# Patient Record
Sex: MALE | Race: WHITE | Employment: OTHER | URBAN - METROPOLITAN AREA
[De-identification: names, ages, dates, MRNs, and addresses within clinical notes are randomized per-mention and may not be internally consistent; named-entity substitution may affect disease eponyms.]

---

## 2018-04-27 PROCEDURE — 82570 ASSAY OF URINE CREATININE: CPT | Performed by: INTERNAL MEDICINE

## 2018-04-27 PROCEDURE — 82043 UR ALBUMIN QUANTITATIVE: CPT | Performed by: INTERNAL MEDICINE

## 2019-03-21 ENCOUNTER — TELEPHONE (OUTPATIENT)
Dept: INTERNAL MEDICINE CLINIC | Facility: CLINIC | Age: 69
End: 2019-03-21

## 2019-03-21 NOTE — TELEPHONE ENCOUNTER
Called and s/w spouse.  Patient has not yet gone to . psr added patient to MDs schedule for tomorrow 8am.

## 2019-03-21 NOTE — TELEPHONE ENCOUNTER
To Dr. Wendy Canada - see below - spoke with pt's wife, advised urgent care, understanding verbalized.   Nursing to f/u in AM.

## 2019-03-21 NOTE — TELEPHONE ENCOUNTER
Pt wife Phoebe Gutierrez is calling pt started with a fever of 102 and chills on Sunday which continued through Monday & Tuesday Wednesday during the day the fever went down but at night he started getting the chills and his temp went to 101  Today during the day the

## 2019-03-22 ENCOUNTER — OFFICE VISIT (OUTPATIENT)
Dept: INTERNAL MEDICINE CLINIC | Facility: CLINIC | Age: 69
End: 2019-03-22
Payer: COMMERCIAL

## 2019-03-22 VITALS
HEART RATE: 75 BPM | BODY MASS INDEX: 30.92 KG/M2 | TEMPERATURE: 98 F | HEIGHT: 67 IN | WEIGHT: 197 LBS | OXYGEN SATURATION: 97 % | DIASTOLIC BLOOD PRESSURE: 66 MMHG | SYSTOLIC BLOOD PRESSURE: 100 MMHG

## 2019-03-22 DIAGNOSIS — R50.9 FEBRILE ILLNESS: Primary | ICD-10-CM

## 2019-03-22 PROCEDURE — 99213 OFFICE O/P EST LOW 20 MIN: CPT | Performed by: INTERNAL MEDICINE

## 2019-03-22 PROCEDURE — 99212 OFFICE O/P EST SF 10 MIN: CPT | Performed by: INTERNAL MEDICINE

## 2019-03-22 RX ORDER — HYDROCODONE BITARTRATE AND HOMATROPINE METHYLBROMIDE ORAL SOLUTION 5; 1.5 MG/5ML; MG/5ML
2.5 LIQUID ORAL 4 TIMES DAILY PRN
Qty: 120 ML | Refills: 1 | Status: SHIPPED | OUTPATIENT
Start: 2019-03-22 | End: 2019-05-15

## 2019-03-22 RX ORDER — OSELTAMIVIR PHOSPHATE 75 MG/1
75 CAPSULE ORAL 2 TIMES DAILY
Qty: 10 CAPSULE | Refills: 0 | Status: SHIPPED | OUTPATIENT
Start: 2019-03-22 | End: 2019-05-15

## 2019-03-22 NOTE — PROGRESS NOTES
HPI:    Patient ID: Rosemarie Saavedra is a 79year old male. 5 days ago, patient developed sudden onset of chills and fever up to 102. He noted diminished appetite along with myalgias and arthralgias  He denied any nausea or vomiting.   There has been no marley 90 tablet Rfl: 3   furosemide 40 MG Oral Tab Take 1 tablet (40 mg total) by mouth daily. Disp: 90 tablet Rfl: 3   atorvastatin 20 MG Oral Tab Take 1 tablet (20 mg total) by mouth daily.  Disp: 90 tablet Rfl: 3     Allergies:No Known Allergies   PHYSICAL EXA (two) times daily. • hydrocodone-homatropine 5-1.5 MG/5ML Oral Syrup 120 mL 1     Sig: Take 2.5 mL by mouth 4 (four) times daily as needed.        Imaging & Referrals:  None       GA#[id number]

## 2019-05-06 NOTE — TELEPHONE ENCOUNTER
Patient calling for refill for all the below medications. Lisinopril 20mg  Metoprolol 100mg  Spironalactone 25mg    Alprazolam .5mg  Januvia   Completely out of these two.     [place] [place] [phone]

## 2019-05-07 NOTE — TELEPHONE ENCOUNTER
To [employer] - please call pt to schedule annual physical (Medicare?) exam - he was to return 10/18 for 6 month check. After pt scheduled, please send back to RX - thank you!

## 2019-05-08 RX ORDER — LISINOPRIL 20 MG/1
20 TABLET ORAL 2 TIMES DAILY
Qty: 180 TABLET | Refills: 0 | Status: SHIPPED | OUTPATIENT
Start: 2019-05-08 | End: 2019-05-15

## 2019-05-08 RX ORDER — SPIRONOLACTONE 25 MG/1
25 TABLET ORAL DAILY
Qty: 90 TABLET | Refills: 0 | Status: SHIPPED | OUTPATIENT
Start: 2019-05-08 | End: 2019-05-15

## 2019-05-08 RX ORDER — METOPROLOL SUCCINATE 100 MG/1
100 TABLET, EXTENDED RELEASE ORAL 2 TIMES DAILY
Qty: 180 TABLET | Refills: 0 | Status: SHIPPED | OUTPATIENT
Start: 2019-05-08 | End: 2019-05-15

## 2019-05-08 RX ORDER — ALPRAZOLAM 0.5 MG/1
0.5 TABLET ORAL 2 TIMES DAILY PRN
Qty: 60 TABLET | Refills: 5 | Status: SHIPPED
Start: 2019-05-08 | End: 2019-05-15

## 2019-05-08 NOTE — TELEPHONE ENCOUNTER
[address] ram  rec'd 4 prescriptions  Did not receive refills for:  Alprazolam, Qty 60  Furosemide, Qty 90  Please call pharmacy to advise at [phone]  Tasked to RX

## 2019-05-09 RX ORDER — FUROSEMIDE 40 MG/1
40 TABLET ORAL DAILY
Qty: 90 TABLET | Refills: 0 | Status: SHIPPED | OUTPATIENT
Start: 2019-05-09 | End: 2019-05-15

## 2019-05-15 ENCOUNTER — LAB ENCOUNTER (OUTPATIENT)
Dept: LAB | Age: 69
End: 2019-05-15
Attending: INTERNAL MEDICINE
Payer: COMMERCIAL

## 2019-05-15 ENCOUNTER — OFFICE VISIT (OUTPATIENT)
Dept: INTERNAL MEDICINE CLINIC | Facility: CLINIC | Age: 69
End: 2019-05-15
Payer: COMMERCIAL

## 2019-05-15 VITALS
WEIGHT: 197 LBS | HEIGHT: 65.4 IN | HEART RATE: 65 BPM | OXYGEN SATURATION: 98 % | DIASTOLIC BLOOD PRESSURE: 70 MMHG | SYSTOLIC BLOOD PRESSURE: 130 MMHG | BODY MASS INDEX: 32.43 KG/M2 | TEMPERATURE: 98 F

## 2019-05-15 DIAGNOSIS — I25.10 CORONARY ARTERY DISEASE INVOLVING NATIVE CORONARY ARTERY OF NATIVE HEART WITHOUT ANGINA PECTORIS: ICD-10-CM

## 2019-05-15 DIAGNOSIS — E78.00 PURE HYPERCHOLESTEROLEMIA: ICD-10-CM

## 2019-05-15 DIAGNOSIS — Z00.00 PHYSICAL EXAM: Primary | ICD-10-CM

## 2019-05-15 DIAGNOSIS — I10 ESSENTIAL HYPERTENSION WITH GOAL BLOOD PRESSURE LESS THAN 140/90: ICD-10-CM

## 2019-05-15 DIAGNOSIS — I48.0 PAROXYSMAL ATRIAL FIBRILLATION (HCC): ICD-10-CM

## 2019-05-15 DIAGNOSIS — E11.9 TYPE 2 DIABETES MELLITUS WITHOUT COMPLICATION, WITHOUT LONG-TERM CURRENT USE OF INSULIN (HCC): ICD-10-CM

## 2019-05-15 DIAGNOSIS — M70.41 PREPATELLAR BURSITIS OF RIGHT KNEE: ICD-10-CM

## 2019-05-15 DIAGNOSIS — Z00.00 PHYSICAL EXAM: ICD-10-CM

## 2019-05-15 PROCEDURE — 82570 ASSAY OF URINE CREATININE: CPT

## 2019-05-15 PROCEDURE — 99397 PER PM REEVAL EST PAT 65+ YR: CPT | Performed by: INTERNAL MEDICINE

## 2019-05-15 PROCEDURE — 36415 COLL VENOUS BLD VENIPUNCTURE: CPT

## 2019-05-15 PROCEDURE — 85025 COMPLETE CBC W/AUTO DIFF WBC: CPT

## 2019-05-15 PROCEDURE — 85060 BLOOD SMEAR INTERPRETATION: CPT

## 2019-05-15 PROCEDURE — 84443 ASSAY THYROID STIM HORMONE: CPT

## 2019-05-15 PROCEDURE — 84439 ASSAY OF FREE THYROXINE: CPT

## 2019-05-15 PROCEDURE — 83036 HEMOGLOBIN GLYCOSYLATED A1C: CPT

## 2019-05-15 PROCEDURE — 82043 UR ALBUMIN QUANTITATIVE: CPT

## 2019-05-15 PROCEDURE — 80061 LIPID PANEL: CPT

## 2019-05-15 PROCEDURE — 80053 COMPREHEN METABOLIC PANEL: CPT

## 2019-05-15 RX ORDER — SPIRONOLACTONE 25 MG/1
25 TABLET ORAL DAILY
Qty: 90 TABLET | Refills: 3 | Status: SHIPPED | OUTPATIENT
Start: 2019-05-15 | End: 2020-09-02

## 2019-05-15 RX ORDER — ALPRAZOLAM 0.5 MG/1
0.5 TABLET ORAL 2 TIMES DAILY PRN
Qty: 60 TABLET | Refills: 5 | Status: SHIPPED | OUTPATIENT
Start: 2019-05-15 | End: 2019-12-19

## 2019-05-15 RX ORDER — METOPROLOL SUCCINATE 100 MG/1
100 TABLET, EXTENDED RELEASE ORAL 2 TIMES DAILY
Qty: 180 TABLET | Refills: 3 | Status: SHIPPED | OUTPATIENT
Start: 2019-05-15 | End: 2020-09-02

## 2019-05-15 RX ORDER — LISINOPRIL 20 MG/1
20 TABLET ORAL 2 TIMES DAILY
Qty: 180 TABLET | Refills: 3 | Status: SHIPPED | OUTPATIENT
Start: 2019-05-15 | End: 2020-09-02

## 2019-05-15 RX ORDER — FUROSEMIDE 40 MG/1
40 TABLET ORAL DAILY
Qty: 90 TABLET | Refills: 3 | Status: SHIPPED | OUTPATIENT
Start: 2019-05-15 | End: 2020-01-23

## 2019-05-15 NOTE — PROGRESS NOTES
HPI:    Patient ID: Flor Hamilton is a 79year old male. Resents for continuing care. Symptoms from previous viral respiratory infection have completely resolved. In general patient has been feeling well.   He denies any noticeable atrial fibrillation o History    Tobacco Use      Smoking status: Former Smoker      Smokeless tobacco: Never Used    Alcohol use: No      Alcohol/week: 0.0 oz    Drug use:  No             Current Outpatient Medications:  furosemide 40 MG Oral Tab Take 1 tablet (40 mg total) by regular rhythm and normal heart sounds. No murmur heard. Pulmonary/Chest: Effort normal and breath sounds normal. No respiratory distress. He has no wheezes. He has no rales. Abdominal: Soft. He exhibits no mass. There is no tenderness.    Musculoskele orthopedic surgery for further management. We will check routine labs today.     Patient refuses all adult vaccinations    He will follow-up again in 6 months, sooner if needed  Orders Placed This Encounter      Comp Metabolic Panel (14) [E]      CBC W D

## 2019-05-17 ENCOUNTER — TELEPHONE (OUTPATIENT)
Dept: INTERNAL MEDICINE CLINIC | Facility: CLINIC | Age: 69
End: 2019-05-17

## 2019-05-17 DIAGNOSIS — D64.9 ANEMIA, UNSPECIFIED TYPE: Primary | ICD-10-CM

## 2019-05-17 RX ORDER — OMEPRAZOLE 40 MG/1
40 CAPSULE, DELAYED RELEASE ORAL DAILY
Qty: 90 CAPSULE | Refills: 3 | Status: SHIPPED | OUTPATIENT
Start: 2019-05-17 | End: 2019-06-26

## 2019-05-17 NOTE — TELEPHONE ENCOUNTER
That is too long,--can we see if patient can get in sooner. If not, we can refer to Dr. Fabrice Vences or Associates.

## 2019-05-17 NOTE — TELEPHONE ENCOUNTER
Wife, Nikos Smith, stating she spoke to Dr Nonie Gaucher personally on the phone. Dr Nonie Gaucher informed her to have the pt see a gastrologist, Dr Dana Kennedy, but the pt is seeing Dr Slava Castro instead. Wife states the first opening was 6/17, wondering if that's okay to wait that long?

## 2019-05-17 NOTE — TELEPHONE ENCOUNTER
Message relayed to Naida Gutierrez who verbalized understanding. She will also call the office of a group of gasroenterologists out of [facility] where she works on Monday. Attempted to call Dr. Mariah Boas office ([phone]). The office was closed.      Nursing to fannie

## 2019-05-20 NOTE — TELEPHONE ENCOUNTER
Mattie Tesfaye called back. She states she was able to get Lucero Gama an appt with GI at [facility] on 5/30. 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     She will call us back tomorrow

## 2019-05-20 NOTE — TELEPHONE ENCOUNTER
Phone call transferred from . 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  She thinks Dr Kell Ansari office can see him on Thursday they are just requ

## 2019-05-20 NOTE — TELEPHONE ENCOUNTER
Called Dr. Joanna Orantes office. They do not have a sooner appt available for entire practice. They were only able to put patient on a wait list in which pt will be alerted via [employer] if something sooner opens up. LMTCB for spouse Kell Wilcox.  Does she want to michael

## 2019-05-28 NOTE — TELEPHONE ENCOUNTER
I spoke with Isabell-- has egd and colonoscopy scheduled for this Friday at [facility]. Hgb has been holding stable.

## 2019-05-28 NOTE — TELEPHONE ENCOUNTER
To Dr. Watkins Plants - see below, pt has GI appt Friday, 5/30/19 - pt's wife would like to speak with you - hgb is 7.7, ferritin 4 , iron 41. What is plan? Should pt have blood? How long will it take to rebuild his blood?  Pt hardly has any energy to walk around

## 2019-05-28 NOTE — TELEPHONE ENCOUNTER
Pt's wife, Tom Crow, called asking for a call back from Dr Pollo Wilde. Tom Crow states she has some questions for Dr Pollo Wilde. She would not give anymore information than that, pt sounded like she was on a train or something at the time, lots of background noise.      Best

## 2019-06-04 DIAGNOSIS — D64.9 ANEMIA, UNSPECIFIED TYPE: Primary | ICD-10-CM

## 2019-06-04 NOTE — TELEPHONE ENCOUNTER
Pt wife Tresa Fransisca calling to give Dr Von Loya an update about pt colonoscopy, please call [phone]     Tasked to nursing

## 2019-06-04 NOTE — TELEPHONE ENCOUNTER
Please advise - called spouse per herberth who states that  has Colon Cancer .  masses were found and has ale with colo - rectal surgeon and CT scan abdomen , lungs( everything at [facility]) , spouse states he is very weak and Hgb is very low and she wants

## 2019-06-04 NOTE — TELEPHONE ENCOUNTER
I spoke with wife. Will check CBC--if significant drop in hgb, may then benefit from transfusion.   Order faxed to [facility]

## 2019-06-07 ENCOUNTER — TELEPHONE (OUTPATIENT)
Dept: INTERNAL MEDICINE CLINIC | Facility: CLINIC | Age: 69
End: 2019-06-07

## 2019-06-07 NOTE — TELEPHONE ENCOUNTER
Hemoglobin is stable at 7.9--this actually shows some improvement. Therefore no transfusion needed at this time. Patient has seen colorectal specialist who is recommending surgery after genetic counseling. I spoke with wife about above.   They will be sc

## 2019-06-13 ENCOUNTER — TELEPHONE (OUTPATIENT)
Dept: INTERNAL MEDICINE CLINIC | Facility: CLINIC | Age: 69
End: 2019-06-13

## 2019-06-13 DIAGNOSIS — D64.9 SEVERE ANEMIA: ICD-10-CM

## 2019-06-13 DIAGNOSIS — C18.9 MALIGNANT NEOPLASM OF COLON, UNSPECIFIED PART OF COLON (HCC): Primary | ICD-10-CM

## 2019-06-13 NOTE — TELEPHONE ENCOUNTER
Please call pt wife regarding beginning iron infusions for pt hemoglobin.   This was recommended by Dr Janel Jane  Surgeon feels that this is a good idea  Please call to discuss pt and wife to discuss  Tasked to nursing

## 2019-06-13 NOTE — TELEPHONE ENCOUNTER
I spoke with patient's wife  Agree with iron infusion. Patient wants to see if this can be set up at [address] [place] [address].   She will call back if needed and then we will need to send order

## 2019-06-14 PROBLEM — D64.9 ACUTE ANEMIA: Status: ACTIVE | Noted: 2019-06-14

## 2019-06-14 PROBLEM — C18.9 COLON CANCER (HCC): Status: ACTIVE | Noted: 2019-06-14

## 2019-06-14 NOTE — TELEPHONE ENCOUNTER
Spoke with Ruben Nissen at [facility]. She states they are not able to accept orders from a non-[place] physician. So, patient would either need to establish with a hematologist at [facility]. [address] or have infusions done at [address] [place] [address].  Spoke with wife, Cherri Back,

## 2019-06-14 NOTE — TELEPHONE ENCOUNTER
Pt wife called, she called [facility]  If order faxed they can make arrangements for infusions for pt.   Office at [facility] closes at [address] Nonie Gaucher can send this today  TLR#[phone]  Tasked to nursing

## 2019-06-14 NOTE — TELEPHONE ENCOUNTER
Re: order that was received  Needs order to be from a [address]  In order for the cancer center to do this    Suggest that we call Heraclio Baird to see if the hospital has an infusion center or go thru [facility] can be reached at

## 2019-06-14 NOTE — TELEPHONE ENCOUNTER
ICD-10 codes added to printed order: C18.9 and D64.9, colon cancer with severe anemia. Latest scanned labs printed. No OV note that mentions this problem is available in Epic. Paperwork faxed.  LMTCB for infusion center P [phone] to ensure they have wh

## 2019-06-18 ENCOUNTER — TELEPHONE (OUTPATIENT)
Dept: HEMATOLOGY/ONCOLOGY | Facility: HOSPITAL | Age: 69
End: 2019-06-18

## 2019-06-18 NOTE — TELEPHONE ENCOUNTER
I called Geovanny to schedule Iron infusion. His wife Ken Hyatt answered, She said a lot has been going on and he was recently diagnosis with Colon cancer and now the iron infusion may not be necessary.  She will be with the oncologist on Monday June 24th then call

## 2019-06-24 NOTE — TELEPHONE ENCOUNTER
Can you please fax a order to discontinue Iron infusion, the wife said the surgeon feels Shae Corazon does not need them.  Please fax the order to [phone]

## 2019-06-25 NOTE — TELEPHONE ENCOUNTER
Written order faxed to [facility]; fax confirmation received. Order sent to scan.

## 2019-06-26 ENCOUNTER — LAB ENCOUNTER (OUTPATIENT)
Dept: LAB | Age: 69
End: 2019-06-26
Attending: INTERNAL MEDICINE
Payer: COMMERCIAL

## 2019-06-26 ENCOUNTER — OFFICE VISIT (OUTPATIENT)
Dept: INTERNAL MEDICINE CLINIC | Facility: CLINIC | Age: 69
End: 2019-06-26
Payer: COMMERCIAL

## 2019-06-26 VITALS
HEIGHT: 65.4 IN | DIASTOLIC BLOOD PRESSURE: 60 MMHG | BODY MASS INDEX: 31.47 KG/M2 | SYSTOLIC BLOOD PRESSURE: 134 MMHG | WEIGHT: 191.19 LBS | HEART RATE: 66 BPM | TEMPERATURE: 98 F | OXYGEN SATURATION: 99 %

## 2019-06-26 DIAGNOSIS — I25.10 CORONARY ARTERY DISEASE INVOLVING NATIVE CORONARY ARTERY OF NATIVE HEART WITHOUT ANGINA PECTORIS: ICD-10-CM

## 2019-06-26 DIAGNOSIS — I10 ESSENTIAL HYPERTENSION WITH GOAL BLOOD PRESSURE LESS THAN 140/90: ICD-10-CM

## 2019-06-26 DIAGNOSIS — E11.9 TYPE 2 DIABETES MELLITUS WITHOUT COMPLICATION, WITHOUT LONG-TERM CURRENT USE OF INSULIN (HCC): ICD-10-CM

## 2019-06-26 DIAGNOSIS — C18.9 MALIGNANT NEOPLASM OF COLON, UNSPECIFIED PART OF COLON (HCC): ICD-10-CM

## 2019-06-26 DIAGNOSIS — Z01.818 PREOP EXAMINATION: ICD-10-CM

## 2019-06-26 DIAGNOSIS — Z01.818 PREOP EXAMINATION: Primary | ICD-10-CM

## 2019-06-26 DIAGNOSIS — D64.9 ACUTE ANEMIA: ICD-10-CM

## 2019-06-26 DIAGNOSIS — I48.0 PAROXYSMAL ATRIAL FIBRILLATION (HCC): ICD-10-CM

## 2019-06-26 PROCEDURE — 85610 PROTHROMBIN TIME: CPT

## 2019-06-26 PROCEDURE — 80053 COMPREHEN METABOLIC PANEL: CPT

## 2019-06-26 PROCEDURE — 85730 THROMBOPLASTIN TIME PARTIAL: CPT

## 2019-06-26 PROCEDURE — 99212 OFFICE O/P EST SF 10 MIN: CPT | Performed by: INTERNAL MEDICINE

## 2019-06-26 PROCEDURE — 93000 ELECTROCARDIOGRAM COMPLETE: CPT | Performed by: INTERNAL MEDICINE

## 2019-06-26 PROCEDURE — 36415 COLL VENOUS BLD VENIPUNCTURE: CPT

## 2019-06-26 PROCEDURE — 93005 ELECTROCARDIOGRAM TRACING: CPT | Performed by: INTERNAL MEDICINE

## 2019-06-26 PROCEDURE — 99214 OFFICE O/P EST MOD 30 MIN: CPT | Performed by: INTERNAL MEDICINE

## 2019-06-26 PROCEDURE — 85025 COMPLETE CBC W/AUTO DIFF WBC: CPT

## 2019-06-27 ENCOUNTER — TELEPHONE (OUTPATIENT)
Dept: INTERNAL MEDICINE CLINIC | Facility: CLINIC | Age: 69
End: 2019-06-27

## 2019-06-27 NOTE — TELEPHONE ENCOUNTER
Pre-op clearance forms, note, labs and ekg faxed to [facility]; fax confirmation received.

## 2019-06-27 NOTE — TELEPHONE ENCOUNTER
----- Message from Trey Owen MD sent at 6/27/2019 11:17 AM CDT -----  CBC shows continued anemia with hemoglobin of 7.3--still no transfusion required at this time since patient is relatively asymptomatic.  PT, PTT are normal.  CMP is unremarkable.

## 2019-07-19 ENCOUNTER — TELEPHONE (OUTPATIENT)
Dept: INTERNAL MEDICINE CLINIC | Facility: CLINIC | Age: 69
End: 2019-07-19

## 2019-07-19 RX ORDER — LANCETS 33 GAUGE
EACH MISCELLANEOUS
Qty: 100 EACH | Refills: 3 | Status: SHIPPED | OUTPATIENT
Start: 2019-07-19

## 2019-07-19 NOTE — TELEPHONE ENCOUNTER
Pt wife Bulmaro Arguello calling for refill One Touch Ultra Blue Test Strips, One Touch Delica Lancets  Scytl to rx

## 2019-07-23 ENCOUNTER — TELEPHONE (OUTPATIENT)
Dept: INTERNAL MEDICINE CLINIC | Facility: CLINIC | Age: 69
End: 2019-07-23

## 2019-07-25 ENCOUNTER — MED REC SCAN ONLY (OUTPATIENT)
Dept: INTERNAL MEDICINE CLINIC | Facility: CLINIC | Age: 69
End: 2019-07-25

## 2019-09-18 RX ORDER — SPIRONOLACTONE 25 MG/1
25 TABLET ORAL DAILY
Qty: 90 TABLET | Refills: 3 | Status: CANCELLED | OUTPATIENT
Start: 2019-09-18

## 2019-09-19 NOTE — TELEPHONE ENCOUNTER
Current refill request refused due to refill is either a duplicate request or has active refills at the pharmacy. Check previous templates.     Requested Prescriptions      No prescriptions requested or ordered in this encounter     Spironolactone

## 2019-11-04 ENCOUNTER — TELEPHONE (OUTPATIENT)
Dept: INTERNAL MEDICINE CLINIC | Facility: CLINIC | Age: 69
End: 2019-11-04

## 2019-11-04 RX ORDER — METOPROLOL SUCCINATE 100 MG/1
100 TABLET, EXTENDED RELEASE ORAL 2 TIMES DAILY
Qty: 180 TABLET | Refills: 3 | OUTPATIENT
Start: 2019-11-04

## 2019-12-19 RX ORDER — ALPRAZOLAM 0.5 MG/1
0.5 TABLET ORAL 2 TIMES DAILY PRN
Qty: 60 TABLET | Refills: 5 | Status: SHIPPED | OUTPATIENT
Start: 2019-12-19 | End: 2020-08-10

## 2019-12-19 NOTE — TELEPHONE ENCOUNTER
Pt wife called, requesting refill for:  Alprazolam  Pt is low on medication  Pt uses [facility] as pharmacy  Tasked to [employer]

## 2019-12-19 NOTE — TELEPHONE ENCOUNTER
To Dr. Twyla Thomson - see below, pended Rx - clarified with pt's wife that pt takes every night for sleep and occasionally for daytime anxiety. To MD:  The above refill request is for a controlled substance. Please review pended medication order.    Print and

## 2020-01-23 ENCOUNTER — LAB ENCOUNTER (OUTPATIENT)
Dept: LAB | Age: 70
End: 2020-01-23
Attending: INTERNAL MEDICINE
Payer: COMMERCIAL

## 2020-01-23 ENCOUNTER — OFFICE VISIT (OUTPATIENT)
Dept: INTERNAL MEDICINE CLINIC | Facility: CLINIC | Age: 70
End: 2020-01-23
Payer: COMMERCIAL

## 2020-01-23 VITALS
DIASTOLIC BLOOD PRESSURE: 80 MMHG | SYSTOLIC BLOOD PRESSURE: 148 MMHG | TEMPERATURE: 98 F | HEIGHT: 65.4 IN | WEIGHT: 185.63 LBS | OXYGEN SATURATION: 99 % | HEART RATE: 67 BPM | BODY MASS INDEX: 30.56 KG/M2

## 2020-01-23 DIAGNOSIS — I48.0 PAROXYSMAL ATRIAL FIBRILLATION (HCC): Primary | ICD-10-CM

## 2020-01-23 DIAGNOSIS — I10 ESSENTIAL HYPERTENSION WITH GOAL BLOOD PRESSURE LESS THAN 140/90: ICD-10-CM

## 2020-01-23 DIAGNOSIS — C18.9 MALIGNANT NEOPLASM OF COLON, UNSPECIFIED PART OF COLON (HCC): ICD-10-CM

## 2020-01-23 DIAGNOSIS — D64.9 ANEMIA, UNSPECIFIED TYPE: ICD-10-CM

## 2020-01-23 DIAGNOSIS — I25.10 CORONARY ARTERY DISEASE INVOLVING NATIVE CORONARY ARTERY OF NATIVE HEART WITHOUT ANGINA PECTORIS: ICD-10-CM

## 2020-01-23 DIAGNOSIS — E11.9 TYPE 2 DIABETES MELLITUS WITHOUT COMPLICATION, WITHOUT LONG-TERM CURRENT USE OF INSULIN (HCC): ICD-10-CM

## 2020-01-23 DIAGNOSIS — L85.3 DRY SKIN: ICD-10-CM

## 2020-01-23 LAB
ALBUMIN SERPL-MCNC: 4.3 G/DL (ref 3.4–5)
ALBUMIN/GLOB SERPL: 1.2 {RATIO} (ref 1–2)
ALP LIVER SERPL-CCNC: 82 U/L (ref 45–117)
ALT SERPL-CCNC: 18 U/L (ref 16–61)
ANION GAP SERPL CALC-SCNC: 4 MMOL/L (ref 0–18)
AST SERPL-CCNC: 8 U/L (ref 15–37)
BASOPHILS # BLD AUTO: 0.09 X10(3) UL (ref 0–0.2)
BASOPHILS NFR BLD AUTO: 1 %
BILIRUB SERPL-MCNC: 0.8 MG/DL (ref 0.1–2)
BUN BLD-MCNC: 13 MG/DL (ref 7–18)
BUN/CREAT SERPL: 10.9 (ref 10–20)
CALCIUM BLD-MCNC: 9 MG/DL (ref 8.5–10.1)
CEA SERPL-MCNC: 1.3 NG/ML (ref ?–5)
CHLORIDE SERPL-SCNC: 110 MMOL/L (ref 98–112)
CO2 SERPL-SCNC: 27 MMOL/L (ref 21–32)
CREAT BLD-MCNC: 1.19 MG/DL (ref 0.7–1.3)
DEPRECATED RDW RBC AUTO: 47.9 FL (ref 35.1–46.3)
EOSINOPHIL # BLD AUTO: 0.32 X10(3) UL (ref 0–0.7)
EOSINOPHIL NFR BLD AUTO: 3.6 %
ERYTHROCYTE [DISTWIDTH] IN BLOOD BY AUTOMATED COUNT: 16.9 % (ref 11–15)
EST. AVERAGE GLUCOSE BLD GHB EST-MCNC: 151 MG/DL (ref 68–126)
GLOBULIN PLAS-MCNC: 3.6 G/DL (ref 2.8–4.4)
GLUCOSE BLD-MCNC: 130 MG/DL (ref 70–99)
HBA1C MFR BLD HPLC: 6.9 % (ref ?–5.7)
HCT VFR BLD AUTO: 46.5 % (ref 39–53)
HGB BLD-MCNC: 14.8 G/DL (ref 13–17.5)
IMM GRANULOCYTES # BLD AUTO: 0.02 X10(3) UL (ref 0–1)
IMM GRANULOCYTES NFR BLD: 0.2 %
LYMPHOCYTES # BLD AUTO: 1.32 X10(3) UL (ref 1–4)
LYMPHOCYTES NFR BLD AUTO: 14.8 %
M PROTEIN MFR SERPL ELPH: 7.9 G/DL (ref 6.4–8.2)
MCH RBC QN AUTO: 25.6 PG (ref 26–34)
MCHC RBC AUTO-ENTMCNC: 31.8 G/DL (ref 31–37)
MCV RBC AUTO: 80.6 FL (ref 80–100)
MONOCYTES # BLD AUTO: 0.7 X10(3) UL (ref 0.1–1)
MONOCYTES NFR BLD AUTO: 7.9 %
NEUTROPHILS # BLD AUTO: 6.44 X10 (3) UL (ref 1.5–7.7)
NEUTROPHILS # BLD AUTO: 6.44 X10(3) UL (ref 1.5–7.7)
NEUTROPHILS NFR BLD AUTO: 72.5 %
OSMOLALITY SERPL CALC.SUM OF ELEC: 294 MOSM/KG (ref 275–295)
PATIENT FASTING Y/N/NP: YES
PLATELET # BLD AUTO: 200 10(3)UL (ref 150–450)
POTASSIUM SERPL-SCNC: 4.6 MMOL/L (ref 3.5–5.1)
RBC # BLD AUTO: 5.77 X10(6)UL (ref 3.8–5.8)
SODIUM SERPL-SCNC: 141 MMOL/L (ref 136–145)
WBC # BLD AUTO: 8.9 X10(3) UL (ref 4–11)

## 2020-01-23 PROCEDURE — 83036 HEMOGLOBIN GLYCOSYLATED A1C: CPT

## 2020-01-23 PROCEDURE — 82378 CARCINOEMBRYONIC ANTIGEN: CPT

## 2020-01-23 PROCEDURE — 85025 COMPLETE CBC W/AUTO DIFF WBC: CPT

## 2020-01-23 PROCEDURE — 99214 OFFICE O/P EST MOD 30 MIN: CPT | Performed by: INTERNAL MEDICINE

## 2020-01-23 PROCEDURE — 36415 COLL VENOUS BLD VENIPUNCTURE: CPT

## 2020-01-23 PROCEDURE — 80053 COMPREHEN METABOLIC PANEL: CPT

## 2020-01-24 NOTE — PROGRESS NOTES
HPI:    Patient ID: Harsh Costa is a 81year old male. Patient presents for follow-up. Patient has been noting a pruritic scaly rash that at times occurs on lower back and arms. He denies any swelling in area or any drainage from rash.   He has not Tab Take 1 tablet (0.5 mg total) by mouth 2 (two) times daily as needed for Sleep or Anxiety.  60 tablet 5   • Glucose Blood (ONETOUCH ULTRA BLUE) In Vitro Strip Check BS once daily1 100 strip 3   • ONETOUCH DELICA LANCETS 82S Does not apply Misc Test BS on and behavior is normal. Judgment normal. His affect is not angry. Cognition and memory are normal. He exhibits a depressed mood. He expresses no homicidal and no suicidal ideation.               ASSESSMENT/PLAN:   Paroxysmal atrial fibrillation (hcc)  (prim control with current medication. Patient remains in normal sinus rhythm and he is having no angina symptoms. Therefore cardiac status is stable. Continue with medical management.     Patient has history of anemia which was secondary to his colon cancer

## 2020-01-30 ENCOUNTER — PATIENT MESSAGE (OUTPATIENT)
Dept: INTERNAL MEDICINE CLINIC | Facility: CLINIC | Age: 70
End: 2020-01-30

## 2020-01-31 RX ORDER — GLIPIZIDE 10 MG/1
10 TABLET, FILM COATED, EXTENDED RELEASE ORAL DAILY
Qty: 30 TABLET | Refills: 6 | Status: SHIPPED | OUTPATIENT
Start: 2020-01-31 | End: 2020-09-02

## 2020-01-31 NOTE — TELEPHONE ENCOUNTER
Please fax lab results as requested. Instead of Januvia, we can try glipizide--make sure pt monitors accuchecks so that we can see if this is just as effective.   There also is higher risk of hypoglycemia with this med when compared to [place] se

## 2020-01-31 NOTE — TELEPHONE ENCOUNTER
From: Alina Núñez  To: Jemima Whitt MD  Sent: 1/30/2020 9:33 PM CST  Subject: Prescription Question    Dr. Chey Lyles- Because of the high cost of Cherre Romp it be possible for Aaron Sin to try a different diabetic med.  He will be on medicare part D starting

## 2020-02-25 ENCOUNTER — TELEPHONE (OUTPATIENT)
Dept: INTERNAL MEDICINE CLINIC | Facility: CLINIC | Age: 70
End: 2020-02-25

## 2020-02-25 NOTE — TELEPHONE ENCOUNTER
I spoke with Isabell--pt admitted with SBO requiring surgery, now at home with recurrent c dif--back on po vanc. Pt will f/u with surgeon.   He will also f/u with me

## 2020-02-25 NOTE — TELEPHONE ENCOUNTER
Wife Salud Keyes called  Requests call back from Dr Fausto Velez re: her husbands recent hospital stay & status     Call back# [phone]

## 2020-06-08 ENCOUNTER — TELEPHONE (OUTPATIENT)
Dept: INTERNAL MEDICINE CLINIC | Facility: CLINIC | Age: 70
End: 2020-06-08

## 2020-06-08 RX ORDER — FUROSEMIDE 40 MG/1
40 TABLET ORAL
COMMUNITY
End: 2020-06-08

## 2020-06-08 RX ORDER — FUROSEMIDE 40 MG/1
40 TABLET ORAL DAILY
Qty: 90 TABLET | Refills: 3 | Status: SHIPPED | OUTPATIENT
Start: 2020-06-08 | End: 2021-06-14

## 2020-06-08 NOTE — TELEPHONE ENCOUNTER
Patient wife Angelica Rogers is calling for refill Furosemide 40 mg  He is experiencing swelling and was told by Dr Tony Rodriguez he can start taking the medication again  [employer]   Tasked to rx

## 2020-06-08 NOTE — TELEPHONE ENCOUNTER
Lasix not on active med list. RX pended, sig needed    Component      Latest Ref Rng & Units 1/23/2020   Glucose      70 - 99 mg/dL 130 (H)   Sodium      136 - 145 mmol/L 141   Potassium      3.5 - 5.1 mmol/L 4.6   Chloride      98 - 112 mmol/L 110   Carbo

## 2020-06-17 ENCOUNTER — TELEPHONE (OUTPATIENT)
Dept: INTERNAL MEDICINE CLINIC | Facility: CLINIC | Age: 70
End: 2020-06-17

## 2020-08-10 ENCOUNTER — TELEPHONE (OUTPATIENT)
Dept: INTERNAL MEDICINE CLINIC | Facility: CLINIC | Age: 70
End: 2020-08-10

## 2020-08-10 RX ORDER — ALPRAZOLAM 0.5 MG/1
0.5 TABLET ORAL 2 TIMES DAILY PRN
Qty: 60 TABLET | Refills: 5 | Status: SHIPPED | OUTPATIENT
Start: 2020-08-10 | End: 2021-01-25

## 2020-08-10 NOTE — TELEPHONE ENCOUNTER
To MD:  The above refill request is for a controlled substance. Please review pended medication order. Print and sign for staff to fax to pharmacy or prescribe electronically. To DR. BRAGG

## 2020-08-10 NOTE — TELEPHONE ENCOUNTER
Pt wife called, requesting refill for:  Alprazolam 0.5MG  Pt uses [address] [place] [address] as pharmacy  Pt is low on medication  Tasked to [employer]

## 2020-09-02 RX ORDER — GLIPIZIDE 10 MG/1
TABLET, FILM COATED, EXTENDED RELEASE ORAL
Qty: 30 TABLET | Refills: 6 | OUTPATIENT
Start: 2020-09-02

## 2020-09-02 RX ORDER — GLIPIZIDE 10 MG/1
10 TABLET, FILM COATED, EXTENDED RELEASE ORAL DAILY
Qty: 90 TABLET | Refills: 1 | Status: SHIPPED | OUTPATIENT
Start: 2020-09-02 | End: 2021-04-15

## 2020-09-02 RX ORDER — LISINOPRIL 20 MG/1
TABLET ORAL
Qty: 180 TABLET | Refills: 3 | OUTPATIENT
Start: 2020-09-02

## 2020-09-02 RX ORDER — LISINOPRIL 20 MG/1
20 TABLET ORAL 2 TIMES DAILY
Qty: 180 TABLET | Refills: 1 | Status: SHIPPED | OUTPATIENT
Start: 2020-09-02 | End: 2021-02-24

## 2020-09-02 RX ORDER — METOPROLOL SUCCINATE 100 MG/1
100 TABLET, EXTENDED RELEASE ORAL 2 TIMES DAILY
Qty: 180 TABLET | Refills: 1 | Status: SHIPPED | OUTPATIENT
Start: 2020-09-02 | End: 2021-02-24

## 2020-09-02 NOTE — TELEPHONE ENCOUNTER
Pt spouse called regarding status for refills  Pt is out of Lisinopril  Pt uses [facility] as pharmacy  Tasked to [employer]

## 2020-09-02 NOTE — TELEPHONE ENCOUNTER
Pt spouse requesting refill for:  Spironolactone  Pt uses [facility] as pharmacy  Tasked to [employer]

## 2020-09-04 RX ORDER — SPIRONOLACTONE 25 MG/1
25 TABLET ORAL DAILY
Qty: 90 TABLET | Refills: 3 | Status: SHIPPED | OUTPATIENT
Start: 2020-09-04 | End: 2021-10-04

## 2020-09-04 NOTE — TELEPHONE ENCOUNTER
Reviewed and Rx done  Requested Prescriptions     Signed Prescriptions Disp Refills   • spironolactone 25 MG Oral Tab 90 tablet 3     Sig: Take 1 tablet (25 mg total) by mouth daily.      Authorizing Provider: Ivelisse Arellano     Ordering User: Wayne Horn

## 2020-09-22 ENCOUNTER — LAB ENCOUNTER (OUTPATIENT)
Dept: LAB | Age: 70
End: 2020-09-22
Attending: INTERNAL MEDICINE
Payer: MEDICARE

## 2020-09-22 ENCOUNTER — OFFICE VISIT (OUTPATIENT)
Dept: INTERNAL MEDICINE CLINIC | Facility: CLINIC | Age: 70
End: 2020-09-22
Payer: MEDICARE

## 2020-09-22 VITALS
TEMPERATURE: 97 F | HEIGHT: 65.7 IN | DIASTOLIC BLOOD PRESSURE: 68 MMHG | HEART RATE: 63 BPM | WEIGHT: 188.63 LBS | OXYGEN SATURATION: 98 % | SYSTOLIC BLOOD PRESSURE: 130 MMHG | BODY MASS INDEX: 30.68 KG/M2

## 2020-09-22 DIAGNOSIS — I10 ESSENTIAL HYPERTENSION: ICD-10-CM

## 2020-09-22 DIAGNOSIS — Z12.5 ENCOUNTER FOR SCREENING FOR MALIGNANT NEOPLASM OF PROSTATE: ICD-10-CM

## 2020-09-22 DIAGNOSIS — Z00.00 PHYSICAL EXAM: ICD-10-CM

## 2020-09-22 DIAGNOSIS — E78.00 PURE HYPERCHOLESTEROLEMIA: ICD-10-CM

## 2020-09-22 DIAGNOSIS — I25.10 CORONARY ARTERY DISEASE INVOLVING NATIVE CORONARY ARTERY OF NATIVE HEART WITHOUT ANGINA PECTORIS: ICD-10-CM

## 2020-09-22 DIAGNOSIS — E11.9 TYPE 2 DIABETES MELLITUS WITHOUT COMPLICATION, WITHOUT LONG-TERM CURRENT USE OF INSULIN (HCC): ICD-10-CM

## 2020-09-22 DIAGNOSIS — C18.9 MALIGNANT NEOPLASM OF COLON, UNSPECIFIED PART OF COLON (HCC): Primary | ICD-10-CM

## 2020-09-22 DIAGNOSIS — I48.0 PAROXYSMAL ATRIAL FIBRILLATION (HCC): ICD-10-CM

## 2020-09-22 DIAGNOSIS — C18.9 MALIGNANT NEOPLASM OF COLON, UNSPECIFIED PART OF COLON (HCC): ICD-10-CM

## 2020-09-22 LAB
ALBUMIN SERPL-MCNC: 4.1 G/DL (ref 3.4–5)
ALBUMIN/GLOB SERPL: 1.1 {RATIO} (ref 1–2)
ALP LIVER SERPL-CCNC: 77 U/L
ALT SERPL-CCNC: 21 U/L
ANION GAP SERPL CALC-SCNC: 7 MMOL/L (ref 0–18)
AST SERPL-CCNC: 13 U/L (ref 15–37)
BASOPHILS # BLD AUTO: 0.13 X10(3) UL (ref 0–0.2)
BASOPHILS NFR BLD AUTO: 1.5 %
BILIRUB SERPL-MCNC: 0.7 MG/DL (ref 0.1–2)
BUN BLD-MCNC: 16 MG/DL (ref 7–18)
BUN/CREAT SERPL: 11.3 (ref 10–20)
CALCIUM BLD-MCNC: 9.9 MG/DL (ref 8.5–10.1)
CEA SERPL-MCNC: 1 NG/ML (ref ?–5)
CHLORIDE SERPL-SCNC: 107 MMOL/L (ref 98–112)
CHOLEST SMN-MCNC: 164 MG/DL (ref ?–200)
CO2 SERPL-SCNC: 25 MMOL/L (ref 21–32)
COMPLEXED PSA SERPL-MCNC: 1.43 NG/ML (ref ?–4)
CREAT BLD-MCNC: 1.42 MG/DL
CREAT UR-SCNC: 51.3 MG/DL
DEPRECATED RDW RBC AUTO: 43.7 FL (ref 35.1–46.3)
EOSINOPHIL # BLD AUTO: 0.26 X10(3) UL (ref 0–0.7)
EOSINOPHIL NFR BLD AUTO: 2.9 %
ERYTHROCYTE [DISTWIDTH] IN BLOOD BY AUTOMATED COUNT: 14.2 % (ref 11–15)
EST. AVERAGE GLUCOSE BLD GHB EST-MCNC: 160 MG/DL (ref 68–126)
GLOBULIN PLAS-MCNC: 3.9 G/DL (ref 2.8–4.4)
GLUCOSE BLD-MCNC: 114 MG/DL (ref 70–99)
HBA1C MFR BLD HPLC: 7.2 % (ref ?–5.7)
HCT VFR BLD AUTO: 42.6 %
HDLC SERPL-MCNC: 42 MG/DL (ref 40–59)
HGB BLD-MCNC: 14.4 G/DL
IMM GRANULOCYTES # BLD AUTO: 0.02 X10(3) UL (ref 0–1)
IMM GRANULOCYTES NFR BLD: 0.2 %
LDLC SERPL CALC-MCNC: 95 MG/DL (ref ?–100)
LYMPHOCYTES # BLD AUTO: 1.15 X10(3) UL (ref 1–4)
LYMPHOCYTES NFR BLD AUTO: 13 %
M PROTEIN MFR SERPL ELPH: 8 G/DL (ref 6.4–8.2)
MCH RBC QN AUTO: 28.7 PG (ref 26–34)
MCHC RBC AUTO-ENTMCNC: 33.8 G/DL (ref 31–37)
MCV RBC AUTO: 85 FL
MICROALBUMIN UR-MCNC: <0.5 MG/DL
MONOCYTES # BLD AUTO: 0.73 X10(3) UL (ref 0.1–1)
MONOCYTES NFR BLD AUTO: 8.3 %
NEUTROPHILS # BLD AUTO: 6.54 X10 (3) UL (ref 1.5–7.7)
NEUTROPHILS # BLD AUTO: 6.54 X10(3) UL (ref 1.5–7.7)
NEUTROPHILS NFR BLD AUTO: 74.1 %
NONHDLC SERPL-MCNC: 122 MG/DL (ref ?–130)
OSMOLALITY SERPL CALC.SUM OF ELEC: 290 MOSM/KG (ref 275–295)
PATIENT FASTING Y/N/NP: YES
PATIENT FASTING Y/N/NP: YES
PLATELET # BLD AUTO: 190 10(3)UL (ref 150–450)
POTASSIUM SERPL-SCNC: 4.8 MMOL/L (ref 3.5–5.1)
RBC # BLD AUTO: 5.01 X10(6)UL
SODIUM SERPL-SCNC: 139 MMOL/L (ref 136–145)
TRIGL SERPL-MCNC: 136 MG/DL (ref 30–149)
TSI SER-ACNC: 2.31 MIU/ML (ref 0.36–3.74)
VLDLC SERPL CALC-MCNC: 27 MG/DL (ref 0–30)
WBC # BLD AUTO: 8.8 X10(3) UL (ref 4–11)

## 2020-09-22 PROCEDURE — 80061 LIPID PANEL: CPT

## 2020-09-22 PROCEDURE — 83036 HEMOGLOBIN GLYCOSYLATED A1C: CPT

## 2020-09-22 PROCEDURE — 82378 CARCINOEMBRYONIC ANTIGEN: CPT

## 2020-09-22 PROCEDURE — 85025 COMPLETE CBC W/AUTO DIFF WBC: CPT

## 2020-09-22 PROCEDURE — 82570 ASSAY OF URINE CREATININE: CPT

## 2020-09-22 PROCEDURE — 36415 COLL VENOUS BLD VENIPUNCTURE: CPT

## 2020-09-22 PROCEDURE — 82043 UR ALBUMIN QUANTITATIVE: CPT

## 2020-09-22 PROCEDURE — G0463 HOSPITAL OUTPT CLINIC VISIT: HCPCS | Performed by: INTERNAL MEDICINE

## 2020-09-22 PROCEDURE — 99213 OFFICE O/P EST LOW 20 MIN: CPT | Performed by: INTERNAL MEDICINE

## 2020-09-22 PROCEDURE — 84443 ASSAY THYROID STIM HORMONE: CPT

## 2020-09-22 PROCEDURE — G0402 INITIAL PREVENTIVE EXAM: HCPCS | Performed by: INTERNAL MEDICINE

## 2020-09-22 PROCEDURE — 80053 COMPREHEN METABOLIC PANEL: CPT

## 2020-09-22 RX ORDER — SILDENAFIL 100 MG/1
100 TABLET, FILM COATED ORAL
Qty: 12 TABLET | Refills: 0 | Status: SHIPPED | OUTPATIENT
Start: 2020-09-22

## 2020-09-22 NOTE — PROGRESS NOTES
Jerilyn Melton is a 81year old male who presents for a Medicare Annual Wellness visit. Generally feels well. Remains active. Patient feels that his bowel movements have nearly returned to normal.  He denies any loose stools.   He denies any blood in h help    Eating: Able without help    Driving: Able without help    Preparing your meals: Able without help    Managing money/bills: Able without help    Taking medications as prescribed: Able without help    Are you able to afford your medications?: Yes 113 (H)     LDL Cholesterol (mg/dL)   Date Value   05/15/2019 73     Calculated LDL (mg/dL)   Date Value   04/27/2018 112        EKG One Time Not needed    Colorectal Cancer Screening      Colonoscopy Screen every 10 years Colonoscopy due on 12/18/2002 Upd 03/27/2017 7.3 (A)     HbA1c (%)   Date Value   04/27/2018 7.1 (H)     HgbA1C (%)   Date Value   01/23/2020 6.9 (H)    No flowsheet data found.     Creat/alb ratio  Annually      LDL  Annually LDL Cholesterol Calc (mg/dL)   Date Value   05/25/2016 113 (H) (hypertension)    • Other abnormal glucose     rubber band hemorroid   • Paroxysmal atrial fibrillation [facility])       Past Surgical History:   Procedure Laterality Date   • OTHER      MAZE   • OTHER      CABAG   • OTHER      appendectomy   • OTHER      pacer atraumatic, normocephalic, ears and throat are clear                Hearing Assessed via: Finger Rub  EYES: PERRLA, EOMI, conjunctiva are clear  Right Eye Visual Acuity: Uncorrected Left Eye Visual Acuity: Uncorrected   Right Eye Chart Acuity: 20/50 Left E resection. Patient declined any chemotherapy and has not been following up with oncology. There is no obvious recurrence at this time. Will check CEA level. Patient also is not checking Accu-Cheks.   However he is compliant with his diabetic medicatio

## 2020-10-22 ENCOUNTER — APPOINTMENT (OUTPATIENT)
Dept: LAB | Age: 70
End: 2020-10-22
Attending: INTERNAL MEDICINE
Payer: MEDICARE

## 2020-10-22 ENCOUNTER — TELEPHONE (OUTPATIENT)
Dept: INTERNAL MEDICINE CLINIC | Facility: CLINIC | Age: 70
End: 2020-10-22

## 2020-10-22 DIAGNOSIS — B34.9 VIRAL SYNDROME: Primary | ICD-10-CM

## 2020-10-22 DIAGNOSIS — B34.9 VIRAL SYNDROME: ICD-10-CM

## 2020-10-22 NOTE — TELEPHONE ENCOUNTER
To Dr. Rika Crabtree - see pt's wife message below. Wife concerned about his co-morbidities. Respiratory infection triage:    Fever:  [x]  No fever - highest temp 99.6  []  Fever>100.4    Cough:  [] Tight cough  [] Cough with exertion  [] Dry cough - tight chest  [] Sputum production, Color: clear    Breathing:  [] Mild shortness of breath interfering with activity  [] Wheezing  [] Pain with deep breathing  [] Using inhaler    Other symptoms:  [x] Sore throat  [] Difficulty swallowing  [x] Nasal drainage/congestion - clear mucus  [x] Sinus congestion/pressure - headache forehead, behind eyes  [] Ear pain  [x] Body aches - past few days, feeling weak  [] Poor appetite  [] Loss of sense of smell   [] Loss of sense of taste  []Conjunctivitis? [] Any recent travel? [] Any sick contacts? [] Are you a healthcare worker? ADDITIONAL NOTES:            Notified patient that we will route this message to the doctor and see what their recommendations would be. In the meantime, if anything worsens, they were advised to call back or seek emergent evaluation.

## 2020-10-22 NOTE — TELEPHONE ENCOUNTER
Please call pt spouse, pt started symptoms last night, chills, temp was 99.6, feels weak and achy  Similar note in for pt spouse, Luis Miguel Presume They would like recommendation to get tested for COVID  Pt has health issues  Tasked to nursing

## 2020-10-22 NOTE — TELEPHONE ENCOUNTER
Message noted. I think it would be beneficial to get a Covid test.  Order in place. He should self quarantine while we are waiting for results. The results may take several days to get back but he can call for an appointment. He should call if symptoms worsen with coughing, difficulty breathing or worsening symptoms in which case he can go directly to immediate care to be evaluated. Also he should call us with any worsening symptoms.

## 2020-10-24 NOTE — TELEPHONE ENCOUNTER
Discussed with patient. Covid test positive. Symptoms started Wednesday. His wife who is also tested positive her symptoms started Monday. Temperature low-grade between 99.4 and 99.8. He feels weak. No sore throat. He does have some headache. Some indigestion. He does have a cough but no shortness of breath. He does have some body aches. His blood sugars are about 110. He does have a history of coronary artery disease and CABG and diabetes mellitus. We talked about going to the emergency room if he develops any shortness of breath or progressive chest symptoms. Patient feels well. He does not feel that ill to do to ER. I asked him to call Dr. Celia Winslow Monday with progress report. He verbalized understanding. He will call if there is any progressive symptoms over the weekend. ( Edith Scheuermann.  Pedro Luis Whelan )

## 2021-01-25 ENCOUNTER — TELEPHONE (OUTPATIENT)
Dept: INTERNAL MEDICINE CLINIC | Facility: CLINIC | Age: 71
End: 2021-01-25

## 2021-01-25 RX ORDER — ALPRAZOLAM 0.5 MG/1
0.5 TABLET ORAL 2 TIMES DAILY PRN
Qty: 60 TABLET | Refills: 5 | Status: SHIPPED | OUTPATIENT
Start: 2021-01-25 | End: 2021-03-15

## 2021-01-25 NOTE — TELEPHONE ENCOUNTER
To MD:  The above refill request is for a controlled substance. Please review pended medication order. Print and sign for staff to fax to pharmacy or prescribe electronically.     Last office visit: 9/22/2020   Last time refill sent and quantity/refills:

## 2021-01-27 DIAGNOSIS — Z23 NEED FOR VACCINATION: ICD-10-CM

## 2021-02-24 NOTE — TELEPHONE ENCOUNTER
To [employer] to please review for very high interaction between spironolactone and lisinopril    Patient has been on this combination for a while but most recent labs had slight cre elevation and change was made to lasix at that time  Pt will be due for 6 mon

## 2021-02-25 RX ORDER — METOPROLOL SUCCINATE 100 MG/1
TABLET, EXTENDED RELEASE ORAL
Qty: 180 TABLET | Refills: 3 | Status: SHIPPED | OUTPATIENT
Start: 2021-02-25 | End: 2021-12-15

## 2021-02-25 RX ORDER — LISINOPRIL 20 MG/1
TABLET ORAL
Qty: 180 TABLET | Refills: 3 | Status: SHIPPED | OUTPATIENT
Start: 2021-02-25 | End: 2021-12-15

## 2021-02-25 NOTE — TELEPHONE ENCOUNTER
Pharmacy called for status  Pt was given an emergency supply of medication  Tasked to [facility]

## 2021-03-09 ENCOUNTER — IMMUNIZATION (OUTPATIENT)
Dept: LAB | Facility: HOSPITAL | Age: 71
End: 2021-03-09
Attending: HOSPITALIST
Payer: MEDICARE

## 2021-03-09 DIAGNOSIS — Z23 NEED FOR VACCINATION: Primary | ICD-10-CM

## 2021-03-09 PROCEDURE — 0011A SARSCOV2 VAC 100MCG/0.5ML IM: CPT

## 2021-03-15 ENCOUNTER — TELEPHONE (OUTPATIENT)
Dept: INTERNAL MEDICINE CLINIC | Facility: CLINIC | Age: 71
End: 2021-03-15

## 2021-03-15 RX ORDER — ALPRAZOLAM 0.5 MG/1
TABLET ORAL
Qty: 60 TABLET | Refills: 0 | Status: SHIPPED | OUTPATIENT
Start: 2021-03-15 | End: 2021-09-13

## 2021-03-15 NOTE — TELEPHONE ENCOUNTER
To MD:  The above refill request is for a controlled substance. Please review pended medication order. Print and sign for staff to fax to pharmacy or prescribe electronically.     Last office visit: 09/22/2020   Last time refill sent and quantity/refills

## 2021-03-25 ENCOUNTER — OFFICE VISIT (OUTPATIENT)
Dept: INTERNAL MEDICINE CLINIC | Facility: CLINIC | Age: 71
End: 2021-03-25
Payer: MEDICARE

## 2021-03-25 VITALS
HEIGHT: 65.7 IN | OXYGEN SATURATION: 98 % | SYSTOLIC BLOOD PRESSURE: 124 MMHG | HEART RATE: 60 BPM | DIASTOLIC BLOOD PRESSURE: 70 MMHG | TEMPERATURE: 98 F | BODY MASS INDEX: 31.98 KG/M2 | WEIGHT: 196.63 LBS

## 2021-03-25 DIAGNOSIS — I10 ESSENTIAL HYPERTENSION WITH GOAL BLOOD PRESSURE LESS THAN 140/90: ICD-10-CM

## 2021-03-25 DIAGNOSIS — E78.00 PURE HYPERCHOLESTEROLEMIA: ICD-10-CM

## 2021-03-25 DIAGNOSIS — I48.0 PAROXYSMAL ATRIAL FIBRILLATION (HCC): ICD-10-CM

## 2021-03-25 DIAGNOSIS — I25.10 CORONARY ARTERY DISEASE INVOLVING NATIVE CORONARY ARTERY OF NATIVE HEART WITHOUT ANGINA PECTORIS: ICD-10-CM

## 2021-03-25 DIAGNOSIS — H92.01 RIGHT EAR PAIN: Primary | ICD-10-CM

## 2021-03-25 DIAGNOSIS — Z86.16 HISTORY OF COVID-19: ICD-10-CM

## 2021-03-25 DIAGNOSIS — C18.9 MALIGNANT NEOPLASM OF COLON, UNSPECIFIED PART OF COLON (HCC): ICD-10-CM

## 2021-03-25 DIAGNOSIS — E11.9 TYPE 2 DIABETES MELLITUS WITHOUT COMPLICATION, WITHOUT LONG-TERM CURRENT USE OF INSULIN (HCC): ICD-10-CM

## 2021-03-25 LAB
CARTRIDGE LOT#: ABNORMAL NUMERIC
HEMOGLOBIN A1C: 7.8 % (ref 4.3–5.6)

## 2021-03-25 PROCEDURE — 83036 HEMOGLOBIN GLYCOSYLATED A1C: CPT | Performed by: INTERNAL MEDICINE

## 2021-03-25 PROCEDURE — 36416 COLLJ CAPILLARY BLOOD SPEC: CPT | Performed by: INTERNAL MEDICINE

## 2021-03-25 PROCEDURE — 99213 OFFICE O/P EST LOW 20 MIN: CPT | Performed by: INTERNAL MEDICINE

## 2021-03-25 NOTE — PROGRESS NOTES
HPI:    Patient ID: Emory Garcia is a 78year old male. Presents with c/o right ear pain. Notes intermittent swelling and scaling helix of right ear--at times also associated with significant discomfort.   Denies any change in hearing  Denies any draina mg total) by mouth daily. 90 tablet 1   • furosemide 40 MG Oral Tab Take 1 tablet (40 mg total) by mouth daily.  90 tablet 3   • Glucose Blood (ONETOUCH ULTRA BLUE) In Vitro Strip Check BS once daily1 100 strip 3   • ONETOUCH DELICA LANCETS 31T Does not ale insulin (hcc)  History of covid-19    Patient has right ear helix skin nonhealing lesion--I am concerned about possible skin cancer. Will refer to dermatology for further evaluation. Patient agrees to schedule appointment with dermatology.     There is no

## 2021-04-06 ENCOUNTER — IMMUNIZATION (OUTPATIENT)
Dept: LAB | Facility: HOSPITAL | Age: 71
End: 2021-04-06
Attending: EMERGENCY MEDICINE
Payer: MEDICARE

## 2021-04-06 DIAGNOSIS — Z23 NEED FOR VACCINATION: Primary | ICD-10-CM

## 2021-04-06 PROCEDURE — 0012A SARSCOV2 VAC 100MCG/0.5ML IM: CPT

## 2021-04-15 RX ORDER — GLIPIZIDE 10 MG/1
10 TABLET, FILM COATED, EXTENDED RELEASE ORAL DAILY
Qty: 90 TABLET | Refills: 1 | Status: SHIPPED | OUTPATIENT
Start: 2021-04-15 | End: 2021-10-21

## 2021-04-23 NOTE — TELEPHONE ENCOUNTER
Please see my chart 4/17/21, MD rx'd amount and instructed to f/u in 2-3 weeks with glucose readings. Current refill request refused due to refill is either a duplicate request or has active refills at the pharmacy. Check previous templates.     Reques

## 2021-04-28 ENCOUNTER — TELEPHONE (OUTPATIENT)
Dept: INTERNAL MEDICINE CLINIC | Facility: CLINIC | Age: 71
End: 2021-04-28

## 2021-04-28 NOTE — TELEPHONE ENCOUNTER
Spoke with pt's wife  Pt is due for ARIANE cavazos/Dr Alissa Yeung in September  They will call back to schedule

## 2021-06-14 RX ORDER — FUROSEMIDE 40 MG/1
TABLET ORAL
Qty: 90 TABLET | Refills: 3 | Status: SHIPPED | OUTPATIENT
Start: 2021-06-14

## 2021-09-12 RX ORDER — SPIRONOLACTONE 25 MG/1
TABLET ORAL
Qty: 90 TABLET | Refills: 3 | OUTPATIENT
Start: 2021-09-12

## 2021-09-12 RX ORDER — GLIPIZIDE 10 MG/1
TABLET, FILM COATED, EXTENDED RELEASE ORAL
Qty: 90 TABLET | Refills: 1 | OUTPATIENT
Start: 2021-09-12

## 2021-09-13 ENCOUNTER — TELEPHONE (OUTPATIENT)
Dept: INTERNAL MEDICINE CLINIC | Facility: CLINIC | Age: 71
End: 2021-09-13

## 2021-09-14 RX ORDER — ALPRAZOLAM 0.5 MG/1
0.5 TABLET ORAL 2 TIMES DAILY PRN
Qty: 60 TABLET | Refills: 0 | Status: SHIPPED | OUTPATIENT
Start: 2021-09-14 | End: 2021-10-19

## 2021-09-14 NOTE — TELEPHONE ENCOUNTER
Called patient to schedule annual physical per nursing.   [facility]

## 2021-09-29 ENCOUNTER — LAB ENCOUNTER (OUTPATIENT)
Dept: LAB | Age: 71
End: 2021-09-29
Attending: INTERNAL MEDICINE
Payer: MEDICARE

## 2021-09-29 ENCOUNTER — OFFICE VISIT (OUTPATIENT)
Dept: INTERNAL MEDICINE CLINIC | Facility: CLINIC | Age: 71
End: 2021-09-29
Payer: MEDICARE

## 2021-09-29 VITALS
RESPIRATION RATE: 16 BRPM | TEMPERATURE: 98 F | HEIGHT: 68 IN | SYSTOLIC BLOOD PRESSURE: 120 MMHG | HEART RATE: 71 BPM | WEIGHT: 158.38 LBS | DIASTOLIC BLOOD PRESSURE: 64 MMHG | OXYGEN SATURATION: 98 % | BODY MASS INDEX: 24 KG/M2

## 2021-09-29 DIAGNOSIS — E11.9 TYPE 2 DIABETES MELLITUS WITHOUT COMPLICATION, WITHOUT LONG-TERM CURRENT USE OF INSULIN (HCC): ICD-10-CM

## 2021-09-29 DIAGNOSIS — Z00.00 PHYSICAL EXAM: Primary | ICD-10-CM

## 2021-09-29 DIAGNOSIS — I48.0 PAROXYSMAL ATRIAL FIBRILLATION (HCC): ICD-10-CM

## 2021-09-29 DIAGNOSIS — I25.10 CORONARY ARTERY DISEASE INVOLVING NATIVE CORONARY ARTERY OF NATIVE HEART WITHOUT ANGINA PECTORIS: ICD-10-CM

## 2021-09-29 DIAGNOSIS — C18.9 MALIGNANT NEOPLASM OF COLON, UNSPECIFIED PART OF COLON (HCC): ICD-10-CM

## 2021-09-29 DIAGNOSIS — I10 ESSENTIAL HYPERTENSION WITH GOAL BLOOD PRESSURE LESS THAN 140/90: ICD-10-CM

## 2021-09-29 DIAGNOSIS — Z00.00 PHYSICAL EXAM: ICD-10-CM

## 2021-09-29 DIAGNOSIS — Z95.0 PACEMAKER: ICD-10-CM

## 2021-09-29 PROBLEM — Z12.5 ENCOUNTER FOR SCREENING FOR MALIGNANT NEOPLASM OF PROSTATE: Status: ACTIVE | Noted: 2021-09-29

## 2021-09-29 PROCEDURE — 36415 COLL VENOUS BLD VENIPUNCTURE: CPT

## 2021-09-29 PROCEDURE — 83036 HEMOGLOBIN GLYCOSYLATED A1C: CPT

## 2021-09-29 PROCEDURE — 82378 CARCINOEMBRYONIC ANTIGEN: CPT

## 2021-09-29 PROCEDURE — 80061 LIPID PANEL: CPT

## 2021-09-29 PROCEDURE — 84443 ASSAY THYROID STIM HORMONE: CPT

## 2021-09-29 PROCEDURE — 85025 COMPLETE CBC W/AUTO DIFF WBC: CPT

## 2021-09-29 PROCEDURE — G0438 PPPS, INITIAL VISIT: HCPCS | Performed by: INTERNAL MEDICINE

## 2021-09-29 PROCEDURE — 80053 COMPREHEN METABOLIC PANEL: CPT

## 2021-09-29 NOTE — PROGRESS NOTES
HPI:    Patient ID: Khushi Rosales is a 78year old male. Presents for physical exam    Has been off metformin--caused his stools to become more loose. Also has been off statin--did not feel right on this.     Denies any chest pain or shortness of breat abdominal pain, nausea and vomiting. Endocrine: Negative for cold intolerance and heat intolerance. Genitourinary: Negative for dysuria and hematuria. Musculoskeletal: Negative for neck pain.    Allergic/Immunologic: Negative for environmental allergi Mouth/Throat:      Pharynx: No oropharyngeal exudate. Eyes:      General: No scleral icterus. Extraocular Movements: Extraocular movements intact. Pupils: Pupils are equal, round, and reactive to light. Neck:      Vascular: No carotid bruit. Metformin but continues to take glipizide. Will check A1c today. Cardiac status is stable. Patient is having no angina. He has no recurrent atrial fibrillation status post Maze procedure. He is unwilling to resume statin.   He will continue with meto

## 2021-09-30 DIAGNOSIS — N28.9 ACUTE RENAL INSUFFICIENCY: Primary | ICD-10-CM

## 2021-10-03 ENCOUNTER — TELEPHONE (OUTPATIENT)
Dept: INTERNAL MEDICINE CLINIC | Facility: CLINIC | Age: 71
End: 2021-10-03

## 2021-10-04 RX ORDER — SPIRONOLACTONE 25 MG/1
TABLET ORAL
Qty: 90 TABLET | Refills: 3 | Status: SHIPPED | OUTPATIENT
Start: 2021-10-04

## 2021-10-04 RX ORDER — SPIRONOLACTONE 25 MG/1
25 TABLET ORAL DAILY
Qty: 90 TABLET | Refills: 3 | OUTPATIENT
Start: 2021-10-04

## 2021-10-04 NOTE — TELEPHONE ENCOUNTER
To DR. BRAGG - from labs 9/29/21  However, creatinine shows increase up to 2.27 and potassium is elevated at 5.4. This means that you are too dry. Therefore lets stop completely furosemide at this time.       More likely an issue with spironolactone;   pls rev

## 2021-10-04 NOTE — TELEPHONE ENCOUNTER
Noted. Rx sent to pharmacy. LMTCB for patient to verify that he received lab result note from 9/30. It does not appear to be read by the patient.

## 2021-10-04 NOTE — TELEPHONE ENCOUNTER
Current refill request refused due to refill is either a duplicate request or has active refills at the pharmacy. Check previous templates.     Requested Prescriptions     Refused Prescriptions Disp Refills   • spironolactone 25 MG Oral Tab 90 tablet 3

## 2021-10-04 NOTE — TELEPHONE ENCOUNTER
Patient overall is too dry. Yes spironolactone does have potential to increase potassium. However patient's overall fluid status is that he is dry. Therefore we will hold furosemide completely at this time as previously stated.   Continue spironolactone

## 2021-10-05 ENCOUNTER — PATIENT MESSAGE (OUTPATIENT)
Dept: INTERNAL MEDICINE CLINIC | Facility: CLINIC | Age: 71
End: 2021-10-05

## 2021-10-06 ENCOUNTER — PATIENT MESSAGE (OUTPATIENT)
Dept: INTERNAL MEDICINE CLINIC | Facility: CLINIC | Age: 71
End: 2021-10-06

## 2021-10-06 NOTE — TELEPHONE ENCOUNTER
From: Sukumar Tran  Sent: 10/6/2021 9:39 AM CDT  To: [facility] Clinical Staff  Subject: New orders    [id number] [address] Will do.

## 2021-10-17 ENCOUNTER — TELEPHONE (OUTPATIENT)
Dept: INTERNAL MEDICINE CLINIC | Facility: CLINIC | Age: 71
End: 2021-10-17

## 2021-10-19 RX ORDER — ALPRAZOLAM 0.5 MG/1
TABLET ORAL
Qty: 60 TABLET | Refills: 0 | Status: SHIPPED | OUTPATIENT
Start: 2021-10-19 | End: 2021-11-27

## 2021-10-19 NOTE — TELEPHONE ENCOUNTER
To MD:  The above refill request is for a controlled substance. Please review pended medication order. Print and sign for staff to fax to pharmacy or prescribe electronically.     Last office visit: 9/29/21  Last time refill sent and quantity/refills: 9/14/

## 2021-10-20 ENCOUNTER — LAB ENCOUNTER (OUTPATIENT)
Dept: LAB | Age: 71
End: 2021-10-20
Attending: INTERNAL MEDICINE
Payer: MEDICARE

## 2021-10-20 DIAGNOSIS — N28.9 ACUTE RENAL INSUFFICIENCY: ICD-10-CM

## 2021-10-20 PROCEDURE — 80048 BASIC METABOLIC PNL TOTAL CA: CPT

## 2021-10-20 PROCEDURE — 36415 COLL VENOUS BLD VENIPUNCTURE: CPT

## 2021-10-21 RX ORDER — SPIRONOLACTONE 25 MG/1
25 TABLET ORAL DAILY
Qty: 90 TABLET | Refills: 3 | Status: CANCELLED | OUTPATIENT
Start: 2021-10-21

## 2021-10-21 RX ORDER — METOPROLOL SUCCINATE 100 MG/1
100 TABLET, EXTENDED RELEASE ORAL 2 TIMES DAILY
Qty: 180 TABLET | Refills: 3 | Status: CANCELLED | OUTPATIENT
Start: 2021-10-21

## 2021-10-21 RX ORDER — ALPRAZOLAM 0.5 MG/1
0.5 TABLET ORAL 2 TIMES DAILY PRN
Qty: 60 TABLET | Refills: 0 | Status: CANCELLED | OUTPATIENT
Start: 2021-10-21

## 2021-10-21 RX ORDER — LISINOPRIL 20 MG/1
20 TABLET ORAL 2 TIMES DAILY
Qty: 180 TABLET | Refills: 3 | Status: CANCELLED | OUTPATIENT
Start: 2021-10-21

## 2021-10-22 RX ORDER — GLIPIZIDE 10 MG/1
TABLET, FILM COATED, EXTENDED RELEASE ORAL
Qty: 90 TABLET | Refills: 1 | OUTPATIENT
Start: 2021-10-22

## 2021-10-22 RX ORDER — GLIPIZIDE 10 MG/1
10 TABLET, FILM COATED, EXTENDED RELEASE ORAL DAILY
Qty: 90 TABLET | Refills: 3 | Status: SHIPPED | OUTPATIENT
Start: 2021-10-22

## 2021-10-22 NOTE — TELEPHONE ENCOUNTER
Current refill request refused due to refill is either a duplicate request or has active refills at the pharmacy. Check previous templates.     Requested Prescriptions     Refused Prescriptions Disp Refills   • GLIPIZIDE 10 MG Oral Tablet 24 Hr [Pharmacy M

## 2021-11-27 ENCOUNTER — TELEPHONE (OUTPATIENT)
Dept: INTERNAL MEDICINE CLINIC | Facility: CLINIC | Age: 71
End: 2021-11-27

## 2021-11-29 RX ORDER — ALPRAZOLAM 0.5 MG/1
0.5 TABLET ORAL 2 TIMES DAILY PRN
Qty: 60 TABLET | Refills: 3 | Status: CANCELLED | OUTPATIENT
Start: 2021-11-29

## 2021-11-29 RX ORDER — ALPRAZOLAM 0.5 MG/1
0.5 TABLET ORAL 2 TIMES DAILY PRN
Qty: 60 TABLET | Refills: 3 | Status: SHIPPED | OUTPATIENT
Start: 2021-11-29

## 2021-11-29 NOTE — TELEPHONE ENCOUNTER
To MD:  The above refill request is for a controlled substance. Please review pended medication order. Print and sign for staff to fax to pharmacy or prescribe electronically.     Last office visit:   6/2021  Last time refill sent and quantity/refills:

## 2021-11-30 NOTE — TELEPHONE ENCOUNTER
#60 with 3 sent 11/29/21     Current refill request refused due to refill is either a duplicate request or has active refills at the pharmacy. Check previous templates.     Requested Prescriptions     Pending Prescriptions Disp Refills   • ALPRAZolam 0.5 M

## 2021-12-01 ENCOUNTER — IMMUNIZATION (OUTPATIENT)
Dept: LAB | Facility: HOSPITAL | Age: 71
End: 2021-12-01
Attending: EMERGENCY MEDICINE
Payer: MEDICARE

## 2021-12-01 DIAGNOSIS — Z23 NEED FOR VACCINATION: Primary | ICD-10-CM

## 2021-12-01 PROCEDURE — 0064A SARSCOV2 VAC 50MCG/0.25ML IM: CPT

## 2021-12-12 RX ORDER — LISINOPRIL 20 MG/1
TABLET ORAL
Qty: 180 TABLET | Refills: 3 | OUTPATIENT
Start: 2021-12-12

## 2021-12-12 RX ORDER — METOPROLOL SUCCINATE 100 MG/1
TABLET, EXTENDED RELEASE ORAL
Qty: 180 TABLET | Refills: 3 | OUTPATIENT
Start: 2021-12-12

## 2021-12-14 NOTE — TELEPHONE ENCOUNTER
Pt wife is calling and states that the pharmacy is denying refills for     Lisinopril and Metoprolol Succinate    Pt wife states that the pt was in to see the doctor in September and all medication were to be put in for refills.     Please call and advise

## 2021-12-15 RX ORDER — LISINOPRIL 20 MG/1
20 TABLET ORAL 2 TIMES DAILY
Qty: 180 TABLET | Refills: 3 | Status: SHIPPED | OUTPATIENT
Start: 2021-12-15

## 2021-12-15 RX ORDER — METOPROLOL SUCCINATE 100 MG/1
100 TABLET, EXTENDED RELEASE ORAL 2 TIMES DAILY
Qty: 180 TABLET | Refills: 3 | Status: SHIPPED | OUTPATIENT
Start: 2021-12-15

## 2021-12-15 NOTE — TELEPHONE ENCOUNTER
Routed to Dr. Nonie Gaucher-- please advise on very high interaction between Spironolactone and lisinopril. Please also review-- Metoprolol Succinate is ordered BID- is this okay or change to tartrate? Thank you!

## 2022-05-11 ENCOUNTER — OFFICE VISIT (OUTPATIENT)
Dept: INTERNAL MEDICINE CLINIC | Facility: CLINIC | Age: 72
End: 2022-05-11
Payer: MEDICARE

## 2022-05-11 ENCOUNTER — LAB ENCOUNTER (OUTPATIENT)
Dept: LAB | Age: 72
End: 2022-05-11
Attending: INTERNAL MEDICINE
Payer: MEDICARE

## 2022-05-11 VITALS
HEIGHT: 65.7 IN | BODY MASS INDEX: 31.55 KG/M2 | OXYGEN SATURATION: 95 % | SYSTOLIC BLOOD PRESSURE: 142 MMHG | DIASTOLIC BLOOD PRESSURE: 72 MMHG | WEIGHT: 194 LBS | TEMPERATURE: 99 F | HEART RATE: 60 BPM

## 2022-05-11 DIAGNOSIS — C18.9 MALIGNANT NEOPLASM OF COLON, UNSPECIFIED PART OF COLON (HCC): ICD-10-CM

## 2022-05-11 DIAGNOSIS — E11.9 TYPE 2 DIABETES MELLITUS WITHOUT COMPLICATION, WITHOUT LONG-TERM CURRENT USE OF INSULIN (HCC): ICD-10-CM

## 2022-05-11 DIAGNOSIS — I25.10 CORONARY ARTERY DISEASE INVOLVING NATIVE CORONARY ARTERY OF NATIVE HEART WITHOUT ANGINA PECTORIS: ICD-10-CM

## 2022-05-11 DIAGNOSIS — I10 ESSENTIAL HYPERTENSION WITH GOAL BLOOD PRESSURE LESS THAN 140/90: ICD-10-CM

## 2022-05-11 DIAGNOSIS — R07.89 CHEST WALL PAIN: Primary | ICD-10-CM

## 2022-05-11 DIAGNOSIS — I48.0 PAROXYSMAL ATRIAL FIBRILLATION (HCC): ICD-10-CM

## 2022-05-11 LAB
ALBUMIN SERPL-MCNC: 4 G/DL (ref 3.4–5)
ALBUMIN/GLOB SERPL: 1.1 {RATIO} (ref 1–2)
ALP LIVER SERPL-CCNC: 67 U/L
ALT SERPL-CCNC: 18 U/L
ANION GAP SERPL CALC-SCNC: 3 MMOL/L (ref 0–18)
AST SERPL-CCNC: 11 U/L (ref 15–37)
BASOPHILS # BLD AUTO: 0.07 X10(3) UL (ref 0–0.2)
BASOPHILS NFR BLD AUTO: 1 %
BILIRUB SERPL-MCNC: 0.9 MG/DL (ref 0.1–2)
BUN BLD-MCNC: 18 MG/DL (ref 7–18)
BUN/CREAT SERPL: 12.4 (ref 10–20)
CALCIUM BLD-MCNC: 9.6 MG/DL (ref 8.5–10.1)
CEA SERPL-MCNC: 0.9 NG/ML (ref ?–5)
CHLORIDE SERPL-SCNC: 109 MMOL/L (ref 98–112)
CO2 SERPL-SCNC: 29 MMOL/L (ref 21–32)
CREAT BLD-MCNC: 1.45 MG/DL
DEPRECATED RDW RBC AUTO: 43.6 FL (ref 35.1–46.3)
EOSINOPHIL # BLD AUTO: 0.2 X10(3) UL (ref 0–0.7)
EOSINOPHIL NFR BLD AUTO: 2.9 %
ERYTHROCYTE [DISTWIDTH] IN BLOOD BY AUTOMATED COUNT: 14.1 % (ref 11–15)
EST. AVERAGE GLUCOSE BLD GHB EST-MCNC: 163 MG/DL (ref 68–126)
FASTING STATUS PATIENT QL REPORTED: NO
GLOBULIN PLAS-MCNC: 3.6 G/DL (ref 2.8–4.4)
GLUCOSE BLD-MCNC: 106 MG/DL (ref 70–99)
HBA1C MFR BLD: 7.3 % (ref ?–5.7)
HCT VFR BLD AUTO: 41.7 %
HGB BLD-MCNC: 13.7 G/DL
IMM GRANULOCYTES # BLD AUTO: 0.01 X10(3) UL (ref 0–1)
IMM GRANULOCYTES NFR BLD: 0.1 %
LYMPHOCYTES # BLD AUTO: 1.02 X10(3) UL (ref 1–4)
LYMPHOCYTES NFR BLD AUTO: 14.6 %
MCH RBC QN AUTO: 28.2 PG (ref 26–34)
MCHC RBC AUTO-ENTMCNC: 32.9 G/DL (ref 31–37)
MCV RBC AUTO: 85.8 FL
MONOCYTES # BLD AUTO: 0.59 X10(3) UL (ref 0.1–1)
MONOCYTES NFR BLD AUTO: 8.5 %
NEUTROPHILS # BLD AUTO: 5.08 X10 (3) UL (ref 1.5–7.7)
NEUTROPHILS # BLD AUTO: 5.08 X10(3) UL (ref 1.5–7.7)
NEUTROPHILS NFR BLD AUTO: 72.9 %
OSMOLALITY SERPL CALC.SUM OF ELEC: 294 MOSM/KG (ref 275–295)
PLATELET # BLD AUTO: 163 10(3)UL (ref 150–450)
POTASSIUM SERPL-SCNC: 4.3 MMOL/L (ref 3.5–5.1)
PROT SERPL-MCNC: 7.6 G/DL (ref 6.4–8.2)
RBC # BLD AUTO: 4.86 X10(6)UL
SODIUM SERPL-SCNC: 141 MMOL/L (ref 136–145)
WBC # BLD AUTO: 7 X10(3) UL (ref 4–11)

## 2022-05-11 PROCEDURE — 83036 HEMOGLOBIN GLYCOSYLATED A1C: CPT

## 2022-05-11 PROCEDURE — 85025 COMPLETE CBC W/AUTO DIFF WBC: CPT

## 2022-05-11 PROCEDURE — 36415 COLL VENOUS BLD VENIPUNCTURE: CPT

## 2022-05-11 PROCEDURE — 82378 CARCINOEMBRYONIC ANTIGEN: CPT

## 2022-05-11 PROCEDURE — 99213 OFFICE O/P EST LOW 20 MIN: CPT | Performed by: INTERNAL MEDICINE

## 2022-05-11 PROCEDURE — 80053 COMPREHEN METABOLIC PANEL: CPT

## 2022-05-11 RX ORDER — MELOXICAM 15 MG/1
15 TABLET ORAL DAILY
Qty: 30 TABLET | Refills: 0 | Status: SHIPPED | OUTPATIENT
Start: 2022-05-11

## 2022-05-12 ENCOUNTER — HOSPITAL ENCOUNTER (OUTPATIENT)
Dept: GENERAL RADIOLOGY | Facility: HOSPITAL | Age: 72
Discharge: HOME | End: 2022-05-12
Attending: INTERNAL MEDICINE
Payer: MEDICARE

## 2022-05-12 ENCOUNTER — HOSPITAL ENCOUNTER (OUTPATIENT)
Dept: GENERAL RADIOLOGY | Age: 72
Discharge: HOME | End: 2022-05-12
Attending: INTERNAL MEDICINE
Payer: MEDICARE

## 2022-05-12 DIAGNOSIS — R07.89 CHEST WALL PAIN: ICD-10-CM

## 2022-05-12 PROCEDURE — 71046 X-RAY EXAM CHEST 2 VIEWS: CPT | Performed by: INTERNAL MEDICINE

## 2022-05-12 PROCEDURE — 71100 X-RAY EXAM RIBS UNI 2 VIEWS: CPT | Performed by: INTERNAL MEDICINE

## 2022-05-16 ENCOUNTER — TELEPHONE (OUTPATIENT)
Dept: INTERNAL MEDICINE CLINIC | Facility: CLINIC | Age: 72
End: 2022-05-16

## 2022-05-16 NOTE — TELEPHONE ENCOUNTER
I spoke with patient's wife, OK per HIPPA. I relayed Dr. Kb Florez message and she verbalized understanding. Patient's wife received the [employer]. Patient did start meloxicam on Thursday. Pain is not much improved with meloxicam. He is sleeping in a different bed that is more comfortable for him. They did not yet schedule the CT. She asks if she should schedule that now. She asks if he should resume the lasix. To Dr. Kevin Wheeler, please advise.

## 2022-05-16 NOTE — TELEPHONE ENCOUNTER
----- Message from Yue Mcallister MD sent at 5/16/2022  8:16 AM CDT -----  Patient has not viewed my chart lab result note. Please notify patient.

## 2022-05-16 NOTE — TELEPHONE ENCOUNTER
I called and spoke with spouse, Myriam Conway (hipaa verified). Relayed MDs message-verbalized understanding.

## 2022-05-16 NOTE — TELEPHONE ENCOUNTER
Would only pursue CT if pain recurs once stopping meloxicam after 7 to 10 days.   Can continue to hold Lasix at this time

## 2022-05-24 ENCOUNTER — PATIENT MESSAGE (OUTPATIENT)
Dept: INTERNAL MEDICINE CLINIC | Facility: CLINIC | Age: 72
End: 2022-05-24

## 2022-05-24 DIAGNOSIS — R10.9 ABDOMINAL PAIN, UNSPECIFIED ABDOMINAL LOCATION: Primary | ICD-10-CM

## 2022-05-24 NOTE — TELEPHONE ENCOUNTER
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 Pain remains worse at night and last night had severe pain getting out of bed which eased over time. No nausea. My question is could this be related to his gallbladder and would the scan you ordered determine this or should he have an ultrasound of gallbladder. He is having his ct scan Sunday.  Thank you, Melly Pavon

## 2022-05-25 ENCOUNTER — TELEPHONE (OUTPATIENT)
Dept: INTERNAL MEDICINE CLINIC | Facility: CLINIC | Age: 72
End: 2022-05-25

## 2022-05-25 RX ORDER — ALPRAZOLAM 0.5 MG/1
0.5 TABLET ORAL 2 TIMES DAILY PRN
Qty: 60 TABLET | Refills: 3 | Status: SHIPPED | OUTPATIENT
Start: 2022-05-25

## 2022-05-25 RX ORDER — ALPRAZOLAM 0.5 MG/1
TABLET ORAL
Qty: 60 TABLET | Refills: 0 | Status: CANCELLED | OUTPATIENT
Start: 2022-05-25

## 2022-05-25 NOTE — TELEPHONE ENCOUNTER
Pt spouse called  She called [facility] to schedule ultra sound as per Dr Veena Cheung. Earliest available was 6/15/22  Pt was able to schedule at [address] [place] [address] for Friday  Please FAX order and include STAT so results are available same day and patient and spouse do not have to wait until after the holiday to get results.  They are anxious  FAX#:  [phone]  Tasked to nursing

## 2022-05-25 NOTE — TELEPHONE ENCOUNTER
To MD:  The above refill request is for a controlled substance. Please review pended medication order. Print and sign for staff to fax to pharmacy or prescribe electronically.     Last office visit: 5/11/22  Last time refill sent and quantity/refills: 11/29/21 #60 with 3   Per [place]  last dispensed 4/11/22

## 2022-05-25 NOTE — TELEPHONE ENCOUNTER
Order for [address] Abdomen faxed to [facility] at [phone]- confirmation received- called spouse per hipaa and relayed this message - verbalized understanding

## 2022-05-27 ENCOUNTER — TELEPHONE (OUTPATIENT)
Dept: INTERNAL MEDICINE CLINIC | Facility: CLINIC | Age: 72
End: 2022-05-27

## 2022-05-27 DIAGNOSIS — C18.9 MALIGNANT NEOPLASM OF COLON, UNSPECIFIED PART OF COLON (HCC): ICD-10-CM

## 2022-05-27 DIAGNOSIS — R10.11 RIGHT UPPER QUADRANT ABDOMINAL PAIN: Primary | ICD-10-CM

## 2022-05-27 RX ORDER — HYDROCODONE BITARTRATE AND ACETAMINOPHEN 5; 325 MG/1; MG/1
1 TABLET ORAL EVERY 6 HOURS PRN
Qty: 30 TABLET | Refills: 0 | Status: SHIPPED | OUTPATIENT
Start: 2022-05-27

## 2022-05-27 NOTE — TELEPHONE ENCOUNTER
Phone call to patient and discussed with patient's wife. Patient apparently is doing better at this time. Patient is apparently scheduled for a CT scan of his chest on Sunday at 10:00. Patient's wife is informed me that Dr. Abilio Arnett spoke to patient and to wife today and told him that he would add a CT scan of the abdomen to the scan to be done on Sunday. Patient's wife that she does not need anything more for me at this time. I will forward this message to Dr. Abilio Arnett.

## 2022-05-27 NOTE — TELEPHONE ENCOUNTER
I discussed there situation with Lida Odell - he has had miserable pain in the RUQ, great difficulty sleeping - sx started 3 weeks ago. His GB us shows stones but this is not the likely cause of his pain. He has a CT of the chest scheduled for 5/29 and I am going to add a CT of the abdo and pelvis.      Norco for pain and see Cinda Book next week

## 2022-05-27 NOTE — TELEPHONE ENCOUNTER
Pt's wife Ursula Garner for [facility]. She can be reached at [phone].

## 2022-05-27 NOTE — TELEPHONE ENCOUNTER
No results noted   Called [facility] [phone]. They will fax the report now. Awaiting fax.

## 2022-05-27 NOTE — TELEPHONE ENCOUNTER
Ultrasound abdomen report received    To Dr Sandra Breen you able to please review ultrasound report.  Wife is anxious for results

## 2022-05-27 NOTE — TELEPHONE ENCOUNTER
I spoke with patient and relayed Dr. Tammy Do message. He verbalized understanding. They have several concerns. Patient has pain under his right rib cage on the side. Pain is steady and constant. He cannot sleep at night. Pain is all night long. He is not taking anything. He then says he has vicodin and has taken one. He wonders if he should take another pain pill tonight. Patient and wife are confused about a message that he has a scheduled CT appointment on Jovi, May 29, 2022. They feel the CT was ordered to look into this pain. Patient's wife wants to know if the chest scan is enough. They ask if he also needs an abdominal scan. He says he had colon cancer and resection in 2019. They were also notified that he has a 6/15/22 US appointment. Patient's wife says she forgot to cancel the [address] appointment. Explained that I would relay these concerns to the doctor on call. Explained that the answering service is on. Patient verbalized understanding.

## 2022-05-27 NOTE — TELEPHONE ENCOUNTER
I reviewed his ultrasound report and it does show gallstones but no inflammation of the gallbladder wall. I read Dr. Milagro Soto report and description of Geovanny's pain and his physical findings and it does not seem to me that the gallstones are the likely cause of his pain. However, I want him to discuss this with Dr. Tenzin Amos next week. He can set up an appointment to come in if he likes or call on the phone.

## 2022-05-29 ENCOUNTER — HOSPITAL ENCOUNTER (OUTPATIENT)
Dept: CT IMAGING | Facility: HOSPITAL | Age: 72
End: 2022-05-29
Attending: INTERNAL MEDICINE
Payer: MEDICARE

## 2022-05-29 ENCOUNTER — HOSPITAL ENCOUNTER (OUTPATIENT)
Dept: CT IMAGING | Facility: HOSPITAL | Age: 72
Discharge: HOME | End: 2022-05-29
Attending: INTERNAL MEDICINE
Payer: MEDICARE

## 2022-05-29 DIAGNOSIS — C18.9 MALIGNANT NEOPLASM OF COLON, UNSPECIFIED PART OF COLON (HCC): ICD-10-CM

## 2022-05-29 DIAGNOSIS — C18.9 MALIGNANT NEOPLASM OF COLON, UNSPECIFIED (HCC): ICD-10-CM

## 2022-05-29 DIAGNOSIS — R10.11 RIGHT UPPER QUADRANT ABDOMINAL PAIN: ICD-10-CM

## 2022-05-29 PROCEDURE — 74177 CT ABD & PELVIS W/CONTRAST: CPT | Performed by: INTERNAL MEDICINE

## 2022-05-29 PROCEDURE — 71260 CT THORAX DX C+: CPT | Performed by: INTERNAL MEDICINE

## 2022-05-31 ENCOUNTER — TELEPHONE (OUTPATIENT)
Dept: INTERNAL MEDICINE CLINIC | Facility: CLINIC | Age: 72
End: 2022-05-31

## 2022-05-31 DIAGNOSIS — C76.2 MALIGNANT NEOPLASM OF ABDOMEN (HCC): ICD-10-CM

## 2022-05-31 DIAGNOSIS — C18.9 MALIGNANT NEOPLASM OF COLON, UNSPECIFIED PART OF COLON (HCC): Primary | ICD-10-CM

## 2022-05-31 NOTE — TELEPHONE ENCOUNTER
Wife, Rylan Lua,  called   Requests call back with husbands Ct results   Test was done on Sunday at the hospital    Call back to her cell# [phone]

## 2022-06-07 ENCOUNTER — MED REC SCAN ONLY (OUTPATIENT)
Dept: INTERNAL MEDICINE CLINIC | Facility: CLINIC | Age: 72
End: 2022-06-07

## 2022-06-07 NOTE — PROGRESS NOTES
Ultrasound abdomen results from [facility] reviewed and signed by MD. Tommy Shannon to scanning and one week hold.

## 2022-06-12 RX ORDER — FUROSEMIDE 40 MG/1
TABLET ORAL
Qty: 90 TABLET | Refills: 3 | OUTPATIENT
Start: 2022-06-12

## 2022-09-09 RX ORDER — GLIPIZIDE 10 MG/1
TABLET, FILM COATED, EXTENDED RELEASE ORAL
Qty: 90 TABLET | Refills: 3 | Status: SHIPPED | OUTPATIENT
Start: 2022-09-09

## 2022-09-09 RX ORDER — SPIRONOLACTONE 25 MG/1
TABLET ORAL
Qty: 90 TABLET | Refills: 3 | Status: SHIPPED | OUTPATIENT
Start: 2022-09-09

## 2022-09-09 NOTE — TELEPHONE ENCOUNTER
To Preeti Frances, please advise  Drug-Drug: spironolactone and lisinopril  Hyperkalemia, possibly with cardiac arrhythmias or arrest, may occur with the combination of potassium-sparing diuretics and ACE inhibitors. Serum potassium concentrations and renal function should be monitored. Patients with renal impairment, diabetes, older age, severe heart failure, and risk for dehydration may be at greater risk.

## 2022-10-12 ENCOUNTER — TELEPHONE (OUTPATIENT)
Dept: INTERNAL MEDICINE CLINIC | Facility: CLINIC | Age: 72
End: 2022-10-12

## 2022-10-12 NOTE — TELEPHONE ENCOUNTER
Patients wife dropped off forms for Disabilities Certification for parking   Please mail to  when forms are completed. Forms placed in Dr Kait Short mail box. Sherri Wray

## 2022-11-08 ENCOUNTER — TELEPHONE (OUTPATIENT)
Dept: INTERNAL MEDICINE CLINIC | Facility: CLINIC | Age: 72
End: 2022-11-08

## 2022-11-10 RX ORDER — ALPRAZOLAM 0.5 MG/1
0.5 TABLET ORAL 2 TIMES DAILY PRN
Qty: 60 TABLET | Refills: 3 | Status: SHIPPED | OUTPATIENT
Start: 2022-11-10

## 2022-11-10 NOTE — TELEPHONE ENCOUNTER
To Dr. Sherry Metz:  Please review in absence of Dr. Figueroa Marking    To MD:  The above refill request is for a controlled substance. Please review pended medication order. Print and sign for staff to fax to pharmacy or prescribe electronically.     Last office visit: 5/11/22  Last time refill sent and quantity/refills: per [place]  last dispensed 09/30/22 #60/0

## 2022-12-08 RX ORDER — LISINOPRIL 20 MG/1
TABLET ORAL
Qty: 180 TABLET | Refills: 0 | Status: SHIPPED | OUTPATIENT
Start: 2022-12-08

## 2022-12-08 RX ORDER — METOPROLOL SUCCINATE 100 MG/1
TABLET, EXTENDED RELEASE ORAL
Qty: 180 TABLET | Refills: 3 | Status: SHIPPED | OUTPATIENT
Start: 2022-12-08

## 2022-12-08 NOTE — TELEPHONE ENCOUNTER
Lisinopril to [place] for very high drug interaction.     Also to [facility] to assist in scheduling PE, thank you

## 2023-03-13 RX ORDER — LISINOPRIL 20 MG/1
20 TABLET ORAL 2 TIMES DAILY
Qty: 180 TABLET | Refills: 0 | Status: SHIPPED | OUTPATIENT
Start: 2023-03-13

## 2023-03-13 NOTE — TELEPHONE ENCOUNTER
Called pateint per nurse request to schedule annual physical.    Scheduled Medicare Annual for 4/19/2023.

## 2023-04-19 ENCOUNTER — OFFICE VISIT (OUTPATIENT)
Dept: INTERNAL MEDICINE CLINIC | Facility: CLINIC | Age: 73
End: 2023-04-19

## 2023-04-19 ENCOUNTER — LAB ENCOUNTER (OUTPATIENT)
Dept: LAB | Age: 73
End: 2023-04-19
Attending: INTERNAL MEDICINE
Payer: MEDICARE

## 2023-04-19 VITALS
SYSTOLIC BLOOD PRESSURE: 126 MMHG | OXYGEN SATURATION: 98 % | HEART RATE: 68 BPM | WEIGHT: 191.25 LBS | DIASTOLIC BLOOD PRESSURE: 78 MMHG | HEIGHT: 65.7 IN | BODY MASS INDEX: 31.11 KG/M2

## 2023-04-19 DIAGNOSIS — C18.9 MALIGNANT NEOPLASM OF COLON, UNSPECIFIED PART OF COLON (HCC): ICD-10-CM

## 2023-04-19 DIAGNOSIS — I25.10 CORONARY ARTERY DISEASE INVOLVING NATIVE CORONARY ARTERY OF NATIVE HEART WITHOUT ANGINA PECTORIS: ICD-10-CM

## 2023-04-19 DIAGNOSIS — Z12.5 ENCOUNTER FOR SCREENING FOR MALIGNANT NEOPLASM OF PROSTATE: ICD-10-CM

## 2023-04-19 DIAGNOSIS — I10 PRIMARY HYPERTENSION: ICD-10-CM

## 2023-04-19 DIAGNOSIS — Z00.00 PHYSICAL EXAM: Primary | ICD-10-CM

## 2023-04-19 DIAGNOSIS — E11.9 TYPE 2 DIABETES MELLITUS WITHOUT COMPLICATION, WITHOUT LONG-TERM CURRENT USE OF INSULIN (HCC): ICD-10-CM

## 2023-04-19 DIAGNOSIS — I48.0 PAROXYSMAL ATRIAL FIBRILLATION (HCC): ICD-10-CM

## 2023-04-19 DIAGNOSIS — Z00.00 PHYSICAL EXAM: ICD-10-CM

## 2023-04-19 LAB
ALBUMIN SERPL-MCNC: 3.9 G/DL (ref 3.4–5)
ALBUMIN/GLOB SERPL: 1.1 {RATIO} (ref 1–2)
ALP LIVER SERPL-CCNC: 70 U/L
ALT SERPL-CCNC: 24 U/L
ANION GAP SERPL CALC-SCNC: 5 MMOL/L (ref 0–18)
AST SERPL-CCNC: 13 U/L (ref 15–37)
BASOPHILS # BLD AUTO: 0.07 X10(3) UL (ref 0–0.2)
BASOPHILS NFR BLD AUTO: 1 %
BILIRUB SERPL-MCNC: 0.9 MG/DL (ref 0.1–2)
BUN BLD-MCNC: 19 MG/DL (ref 7–18)
BUN/CREAT SERPL: 13.3 (ref 10–20)
CALCIUM BLD-MCNC: 9.5 MG/DL (ref 8.5–10.1)
CEA SERPL-MCNC: 1.3 NG/ML (ref ?–5)
CHLORIDE SERPL-SCNC: 107 MMOL/L (ref 98–112)
CHOLEST SERPL-MCNC: 152 MG/DL (ref ?–200)
CO2 SERPL-SCNC: 30 MMOL/L (ref 21–32)
COMPLEXED PSA SERPL-MCNC: 1.24 NG/ML (ref ?–4)
CREAT BLD-MCNC: 1.43 MG/DL
CREAT UR-SCNC: 83.9 MG/DL
DEPRECATED RDW RBC AUTO: 42.5 FL (ref 35.1–46.3)
EOSINOPHIL # BLD AUTO: 0.23 X10(3) UL (ref 0–0.7)
EOSINOPHIL NFR BLD AUTO: 3.4 %
ERYTHROCYTE [DISTWIDTH] IN BLOOD BY AUTOMATED COUNT: 13.5 % (ref 11–15)
EST. AVERAGE GLUCOSE BLD GHB EST-MCNC: 166 MG/DL (ref 68–126)
FASTING PATIENT LIPID ANSWER: YES
FASTING STATUS PATIENT QL REPORTED: YES
GFR SERPLBLD BASED ON 1.73 SQ M-ARVRAT: 53 ML/MIN/1.73M2 (ref 60–?)
GLOBULIN PLAS-MCNC: 3.6 G/DL (ref 2.8–4.4)
GLUCOSE BLD-MCNC: 119 MG/DL (ref 70–99)
HBA1C MFR BLD: 7.4 % (ref ?–5.7)
HCT VFR BLD AUTO: 41.1 %
HDLC SERPL-MCNC: 40 MG/DL (ref 40–59)
HGB BLD-MCNC: 13.8 G/DL
IMM GRANULOCYTES # BLD AUTO: 0.02 X10(3) UL (ref 0–1)
IMM GRANULOCYTES NFR BLD: 0.3 %
LDLC SERPL CALC-MCNC: 88 MG/DL (ref ?–100)
LYMPHOCYTES # BLD AUTO: 1.06 X10(3) UL (ref 1–4)
LYMPHOCYTES NFR BLD AUTO: 15.6 %
MCH RBC QN AUTO: 28.7 PG (ref 26–34)
MCHC RBC AUTO-ENTMCNC: 33.6 G/DL (ref 31–37)
MCV RBC AUTO: 85.4 FL
MICROALBUMIN UR-MCNC: 1.11 MG/DL
MICROALBUMIN/CREAT 24H UR-RTO: 13.2 UG/MG (ref ?–30)
MONOCYTES # BLD AUTO: 0.62 X10(3) UL (ref 0.1–1)
MONOCYTES NFR BLD AUTO: 9.1 %
NEUTROPHILS # BLD AUTO: 4.8 X10 (3) UL (ref 1.5–7.7)
NEUTROPHILS # BLD AUTO: 4.8 X10(3) UL (ref 1.5–7.7)
NEUTROPHILS NFR BLD AUTO: 70.6 %
NONHDLC SERPL-MCNC: 112 MG/DL (ref ?–130)
OSMOLALITY SERPL CALC.SUM OF ELEC: 297 MOSM/KG (ref 275–295)
PLATELET # BLD AUTO: 141 10(3)UL (ref 150–450)
POTASSIUM SERPL-SCNC: 3.9 MMOL/L (ref 3.5–5.1)
PROT SERPL-MCNC: 7.5 G/DL (ref 6.4–8.2)
RBC # BLD AUTO: 4.81 X10(6)UL
SODIUM SERPL-SCNC: 142 MMOL/L (ref 136–145)
TRIGL SERPL-MCNC: 133 MG/DL (ref 30–149)
TSI SER-ACNC: 1.72 MIU/ML (ref 0.36–3.74)
VLDLC SERPL CALC-MCNC: 21 MG/DL (ref 0–30)
WBC # BLD AUTO: 6.8 X10(3) UL (ref 4–11)

## 2023-04-19 PROCEDURE — 85025 COMPLETE CBC W/AUTO DIFF WBC: CPT

## 2023-04-19 PROCEDURE — 82043 UR ALBUMIN QUANTITATIVE: CPT

## 2023-04-19 PROCEDURE — 80061 LIPID PANEL: CPT

## 2023-04-19 PROCEDURE — 80053 COMPREHEN METABOLIC PANEL: CPT

## 2023-04-19 PROCEDURE — 82570 ASSAY OF URINE CREATININE: CPT

## 2023-04-19 PROCEDURE — 83036 HEMOGLOBIN GLYCOSYLATED A1C: CPT

## 2023-04-19 PROCEDURE — 82378 CARCINOEMBRYONIC ANTIGEN: CPT

## 2023-04-19 PROCEDURE — 84443 ASSAY THYROID STIM HORMONE: CPT

## 2023-04-19 PROCEDURE — 36415 COLL VENOUS BLD VENIPUNCTURE: CPT

## 2023-04-20 ENCOUNTER — TELEPHONE (OUTPATIENT)
Dept: INTERNAL MEDICINE CLINIC | Facility: CLINIC | Age: 73
End: 2023-04-20

## 2023-04-21 RX ORDER — ALPRAZOLAM 0.5 MG/1
0.5 TABLET ORAL 2 TIMES DAILY PRN
Qty: 60 TABLET | Refills: 3 | Status: SHIPPED | OUTPATIENT
Start: 2023-04-21

## 2023-04-21 NOTE — TELEPHONE ENCOUNTER
To MD:  The above refill request is for a controlled substance. Please review pended medication order. Print and sign for staff to fax to pharmacy or prescribe electronically.     Last office visit: 4/19/23  Last time refill sent and quantity/refills: 11/10/22 #60 with 3   Per [place] , last dispensed 3/5/23

## 2023-06-07 RX ORDER — LISINOPRIL 20 MG/1
TABLET ORAL
Qty: 180 TABLET | Refills: 3 | Status: SHIPPED | OUTPATIENT
Start: 2023-06-07

## 2023-09-06 RX ORDER — SPIRONOLACTONE 25 MG/1
TABLET ORAL
Qty: 90 TABLET | Refills: 3 | Status: SHIPPED | OUTPATIENT
Start: 2023-09-06

## 2023-09-06 RX ORDER — GLIPIZIDE 10 MG/1
TABLET, FILM COATED, EXTENDED RELEASE ORAL
Qty: 90 TABLET | Refills: 3 | Status: SHIPPED | OUTPATIENT
Start: 2023-09-06

## 2023-09-06 NOTE — TELEPHONE ENCOUNTER
K+ WNL since 2021  Refill request is for a maintenance medication and has met the criteria specified in the Ambulatory Medication Refill Standing Order for eligibility, visits, laboratory, alerts and was sent to the requested pharmacy.     Requested Prescriptions     Signed Prescriptions Disp Refills    SPIRONOLACTONE 25 MG Oral Tab 90 tablet 3     Sig: TAKE 1 TABLET(25 MG) BY MOUTH DAILY     Authorizing Provider: Bam Grant     Ordering User: SOLIS EMANUEL    GLIPIZIDE ER 10 MG Oral Tablet 24 Hr 90 tablet 3     Sig: TAKE 1 TABLET(10 MG) BY MOUTH DAILY     Authorizing Provider: Bam Grant     Ordering User: Eli Bailey

## 2023-09-06 NOTE — TELEPHONE ENCOUNTER
Routed to [facility] for review-- Very High interaction between Lisinopril and Spironolactone     Drug-Drug: spironolactone and lisinoprilHyperkalemia, possibly with cardiac arrhythmias or arrest, may occur with the combination of potassium-sparing diuretics and ACE inhibitors. Serum potassium concentrations and renal function should be monitored. Patients with renal impairment, diabetes, older age, severe heart failure, and risk for dehydration may be at greater risk.

## 2023-09-28 ENCOUNTER — TELEPHONE (OUTPATIENT)
Dept: INTERNAL MEDICINE CLINIC | Facility: CLINIC | Age: 73
End: 2023-09-28

## 2023-09-29 RX ORDER — ALPRAZOLAM 0.5 MG/1
0.5 TABLET ORAL 2 TIMES DAILY PRN
Qty: 60 TABLET | Refills: 5 | Status: SHIPPED | OUTPATIENT
Start: 2023-09-29

## 2023-09-29 NOTE — TELEPHONE ENCOUNTER
To MD:  The above refill request is for a controlled substance. Please review pended medication order. Print and sign for staff to fax to pharmacy or prescribe electronically.     Last office visit: 4/19/23  Last time refill sent and quantity/refills: 4/21/23 qty 60 plus 3    Last dispensed 8/09/23 for qty 60 per [place]

## 2023-10-18 ENCOUNTER — OFFICE VISIT (OUTPATIENT)
Dept: INTERNAL MEDICINE CLINIC | Facility: CLINIC | Age: 73
End: 2023-10-18

## 2023-10-18 VITALS
DIASTOLIC BLOOD PRESSURE: 76 MMHG | OXYGEN SATURATION: 100 % | HEIGHT: 67 IN | SYSTOLIC BLOOD PRESSURE: 122 MMHG | WEIGHT: 184 LBS | BODY MASS INDEX: 28.88 KG/M2 | HEART RATE: 58 BPM

## 2023-10-18 DIAGNOSIS — I10 PRIMARY HYPERTENSION: ICD-10-CM

## 2023-10-18 DIAGNOSIS — I73.9 PAD (PERIPHERAL ARTERY DISEASE) (HCC): ICD-10-CM

## 2023-10-18 DIAGNOSIS — E11.9 TYPE 2 DIABETES MELLITUS WITHOUT COMPLICATION, WITHOUT LONG-TERM CURRENT USE OF INSULIN (HCC): Primary | ICD-10-CM

## 2023-10-18 DIAGNOSIS — G62.9 PERIPHERAL POLYNEUROPATHY: ICD-10-CM

## 2023-10-18 DIAGNOSIS — I48.0 PAROXYSMAL ATRIAL FIBRILLATION (HCC): ICD-10-CM

## 2023-10-18 DIAGNOSIS — C18.9 MALIGNANT NEOPLASM OF COLON, UNSPECIFIED PART OF COLON (HCC): ICD-10-CM

## 2023-10-18 DIAGNOSIS — M48.061 SPINAL STENOSIS OF LUMBAR REGION, UNSPECIFIED WHETHER NEUROGENIC CLAUDICATION PRESENT: ICD-10-CM

## 2023-10-18 DIAGNOSIS — I25.10 CORONARY ARTERY DISEASE INVOLVING NATIVE CORONARY ARTERY OF NATIVE HEART WITHOUT ANGINA PECTORIS: ICD-10-CM

## 2023-10-18 LAB
CARTRIDGE LOT#: ABNORMAL NUMERIC
HEMOGLOBIN A1C: 6.8 % (ref 4.3–5.6)

## 2023-10-18 PROCEDURE — 83036 HEMOGLOBIN GLYCOSYLATED A1C: CPT | Performed by: INTERNAL MEDICINE

## 2023-10-18 PROCEDURE — 99214 OFFICE O/P EST MOD 30 MIN: CPT | Performed by: INTERNAL MEDICINE

## 2023-10-18 RX ORDER — PREGABALIN 50 MG/1
50 CAPSULE ORAL 2 TIMES DAILY
Qty: 60 CAPSULE | Refills: 3 | Status: SHIPPED | OUTPATIENT
Start: 2023-10-18

## 2023-12-06 NOTE — TELEPHONE ENCOUNTER
Please review. Protocol failed / Has no protocol. 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             Passed - In person appointment in the past 12 or next 3 months     Recent Outpatient Visits              1 month ago Type 2 diabetes mellitus without complication, without long-term current use of insulin [facility])    Darius Morris Fonnie Sea, MD    Office Visit    7 months ago Physical exam    [address] [place] Nicklas Heys, MD    Office Visit    1 year ago Chest wall pain    [address] [place], Fonnie Sea, MD    Office Visit    2 years ago Physical exam    [address] [place] Nicklas Heys, MD    Office Visit    2 years ago Right ear pain    [address] [place] Nicklas Heys, MD    Office Visit                      Passed - Last BP reading less than 140/90     BP Readings from Last 1 Encounters:   10/18/23 122/76               Passed - In person appointment or virtual visit in the past 6 months     Recent Outpatient Visits              1 month ago Type 2 diabetes mellitus without complication, without long-term current use of insulin [facility])    Harry Montiel MD    Office Visit    7 months ago Physical exam    [facility] [place] Nicklas Heys, MD    Office Visit    1 year ago Chest wall pain    [address] Marianne Boas, MD    Office Visit    2 years ago Physical exam    [facility] [place]    Office Visit    2 years ago Right ear pain    Justin Bailey MD    Office Visit                      Passed - [facility] or GFRNAA > 50     GFR Evaluation  EGFRCR: 48 , resulted on 4/19/2023                Recent Outpatient Visits              1 month ago Type 2 diabetes mellitus without complication, without long-term current use of insulin [facility])    [address] Montiel [place], Renee Toledo MD    Office Visit    7 months ago Physical exam    [facility] [place]    Office Visit    1 year ago Chest wall pain    [address] [place] [address] [place]    Office Visit    2 years ago Physical exam    [facility] [place]    Office Visit    2 years ago Right ear pain    [facility] [place]    Office Visit

## 2023-12-07 RX ORDER — METOPROLOL SUCCINATE 100 MG/1
100 TABLET, EXTENDED RELEASE ORAL 2 TIMES DAILY
Qty: 180 TABLET | Refills: 1 | Status: SHIPPED | OUTPATIENT
Start: 2023-12-07

## 2024-01-24 ENCOUNTER — HOSPITAL ENCOUNTER (OUTPATIENT)
Dept: ULTRASOUND IMAGING | Facility: HOSPITAL | Age: 74
Discharge: HOME | End: 2024-01-24
Attending: INTERNAL MEDICINE
Payer: MEDICARE

## 2024-01-24 DIAGNOSIS — I73.9 PAD (PERIPHERAL ARTERY DISEASE) (HCC): ICD-10-CM

## 2024-01-24 PROCEDURE — 93925 LOWER EXTREMITY STUDY: CPT | Performed by: INTERNAL MEDICINE

## 2024-01-25 ENCOUNTER — TELEPHONE (OUTPATIENT)
Dept: INTERNAL MEDICINE CLINIC | Facility: CLINIC | Age: 74
End: 2024-01-25

## 2024-01-25 NOTE — TELEPHONE ENCOUNTER
For my own personal review    Ultrasound duplex shows no hemodynamically significant aortoiliac inflow disease.  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  Single-vessel runoff via the anterior tibial artery.  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  Main infrapopliteal runoff vessels are all occluded.    Last seen by Dr. Cole (not seen by myself) with concerns of peripheral arterial disease.  This led to the arterial ultrasound repeat, for which she was recommended to be reevaluated by vascular surgery.    Spoke with Isabell Small is having chronic ulcer of lateral foot - small sore and slightly open. On and off swelling. R foot is reddened. Has pain. Was started on the lyrica which seemed to help but had fluid retention, 6 llbs. He went to chiropractic therapy for neuropathy Tx. Thermal scan of the feet L foot is inflamed    2/6/2024 - IR appt.     Doing local wound care, zinc oxide.  Keeping the area dry.  Seems to be okay, trying to have him walk around.  Pain is adequately controlled.  We discussed red flag signs and symptoms of warrant immediate ER evaluation including fevers, worsening redness in the area, refractory pain.  We have an appointment together 1/30

## 2024-01-25 NOTE — TELEPHONE ENCOUNTER
Patient's wife Isabell is calling to request Dr Helton call to discuss results of US of Lower Extremities from 1/24.  Patient saw the results on MyChart and the results do not look good    Phone [phone]

## 2024-01-28 ENCOUNTER — HOSPITAL ENCOUNTER (OUTPATIENT)
Age: 74
Discharge: HOME | End: 2024-01-28
Payer: MEDICARE

## 2024-01-28 ENCOUNTER — APPOINTMENT (OUTPATIENT)
Dept: GENERAL RADIOLOGY | Age: 74
End: 2024-01-28
Attending: NURSE PRACTITIONER
Payer: MEDICARE

## 2024-01-28 VITALS
TEMPERATURE: 98 F | DIASTOLIC BLOOD PRESSURE: 70 MMHG | SYSTOLIC BLOOD PRESSURE: 169 MMHG | HEART RATE: 59 BPM | RESPIRATION RATE: 24 BRPM | OXYGEN SATURATION: 97 %

## 2024-01-28 DIAGNOSIS — M79.671 RIGHT FOOT PAIN: ICD-10-CM

## 2024-01-28 DIAGNOSIS — L03.115 CELLULITIS OF RIGHT LOWER EXTREMITY: Primary | ICD-10-CM

## 2024-01-28 PROCEDURE — 96365 THER/PROPH/DIAG IV INF INIT: CPT | Performed by: NURSE PRACTITIONER

## 2024-01-28 PROCEDURE — 99203 OFFICE O/P NEW LOW 30 MIN: CPT | Performed by: NURSE PRACTITIONER

## 2024-01-28 PROCEDURE — 73630 X-RAY EXAM OF FOOT: CPT | Performed by: NURSE PRACTITIONER

## 2024-01-28 RX ORDER — HYDROCODONE BITARTRATE AND ACETAMINOPHEN 5; 325 MG/1; MG/1
1 TABLET ORAL EVERY 8 HOURS PRN
Qty: 15 TABLET | Refills: 0 | Status: SHIPPED | OUTPATIENT
Start: 2024-01-28 | End: 2024-01-30

## 2024-01-28 RX ORDER — CEPHALEXIN 500 MG/1
500 CAPSULE ORAL 2 TIMES DAILY
Qty: 14 CAPSULE | Refills: 0 | Status: SHIPPED | OUTPATIENT
Start: 2024-01-28 | End: 2024-02-04

## 2024-01-28 RX ORDER — SULFAMETHOXAZOLE AND TRIMETHOPRIM 800; 160 MG/1; MG/1
1 TABLET ORAL 2 TIMES DAILY
Qty: 14 TABLET | Refills: 0 | Status: SHIPPED | OUTPATIENT
Start: 2024-01-28 | End: 2024-02-04

## 2024-01-28 NOTE — DISCHARGE INSTRUCTIONS
As discussed, x-ray negative for concerns of osteomyelitis.  Will treat you for suspected cellulitis with antibiotics: Bactrim twice a day for 7 days and Keflex twice a day for 7 days.  Norco prescribed, take this as needed for pain that is severe.  You should not work, drink, drive on this medication.  Take a stool softener if you are going to be taking Norco.  Please be aware that Norco has 325 mg of acetaminophen/Tylenol in it already.  You may continue to take Tylenol and NSAIDs for discomfort.    Monitor area, if any worsening redness, swelling, drainage, discharge, pain, fever, chills, fatigue while on antibiotics, I recommend that you go to ER for further evaluation.    Please keep your new patient visit appointment with your PCP for Tuesday.  I will message him after you leave here today to have him notified of current treatment and reevaluation needs.

## 2024-01-28 NOTE — ED INITIAL ASSESSMENT (HPI)
Pt presents to the IC with c/o right foot redness and swelling around an open wound on the lateral aspect of the right foot. Pt has a hx of diabetes and peripheral artery disease. US was done on Thursday and notes the redness has gotten worse after that. No fevers. Pt has chronic numbness/tingling. Pain is described as burning. Mild fluid drainage from the wound.

## 2024-01-28 NOTE — ED PROVIDER NOTES
Patient Seen in: [facility]      History     Chief Complaint   Patient presents with    Wound Care     Wound on rt foot. Foot is red - Entered by patient     Stated Complaint: Wound Care - Wound on rt foot. Foot is red    Subjective: This is a 73-year-old male, past medical history of CAD, hypertension, hyperlipidemia, diabetes, colon cancer, presents to immediate care clinic for evaluation of wound to lateral aspect of right foot.  Patient recently had ultrasound of lower extremities 4 days ago, he was diagnosed with peripheral artery disease.  Patient states that redness and wound was present at time of ultrasound, however he believes it to be worsening.  No fevers, chills, fatigue.  Patient has chronic neuropathy to lower extremities.  However, he does state that area feels as if it is burning.  Patient denies any chest pain, dizziness, lightheadedness, palpitations, shortness of breath.  Well-appearing.  AOx4.  The history is provided by the spouse and the patient.         Objective:   Past Medical History:   Diagnosis Date    Atherosclerosis of coronary artery     CAD (coronary artery disease)     Cancer (HCC)     Colon cancer (HCC)     COVID-19 virus infection     Oct 2020    Diabetes (HCC)     DM2 (diabetes mellitus, type 2) (HCC)     Essential hypertension     HTN (hypertension)     Hyperlipidemia     Other abnormal glucose     rubber band hemorroid    Paroxysmal atrial fibrillation (HCC)               Past Surgical History:   Procedure Laterality Date    OTHER      MAZE    OTHER      CABAG    OTHER      appendectomy    OTHER      pacer    OTHER       Status post hemicolectomy, July 2019                Social History     Socioeconomic History    Marital status:    Tobacco Use    Smoking status: Former    Smokeless tobacco: Never   Vaping Use    Vaping Use: Every day    Substances: Nicotine    Devices: Refillable tank   Substance and Sexual Activity    Alcohol use: No     Alcohol/week:  0.0 standard drinks of alcohol    Drug use: No              Review of Systems   Constitutional: Negative.  Negative for activity change, appetite change, chills, fatigue and fever.   HENT: Negative.     Eyes: Negative.    Respiratory: Negative.  Negative for chest tightness and shortness of breath.    Cardiovascular: Negative.  Negative for chest pain, palpitations and leg swelling.   Gastrointestinal: Negative.    Musculoskeletal:  Positive for myalgias.   Skin:  Positive for color change and wound.   Neurological: Negative.        Positive for stated complaint: Wound Care - Wound on rt foot. Foot is red  Other systems are as noted in HPI.  Constitutional and vital signs reviewed.      All other systems reviewed and negative except as noted above.    Physical Exam     ED Triage Vitals [01/28/24 1245]   BP (!) 169/70   Pulse 59   Resp 24   Temp 98 °F (36.7 °C)   Temp src Temporal   SpO2 97 %   O2 Device None (Room air)       Current:BP (!) 169/70   Pulse 59   Temp 98 °F (36.7 °C) (Temporal)   Resp 24   SpO2 97%         Physical Exam  Constitutional:       General: He is not in acute distress.     Appearance: Normal appearance. He is not ill-appearing.   HENT:      Head: Normocephalic.      Nose: Nose normal. No congestion or rhinorrhea.      Mouth/Throat:      Mouth: Mucous membranes are moist.      Pharynx: Oropharynx is clear. No oropharyngeal exudate or posterior oropharyngeal erythema.   Eyes:      General:         Right eye: No discharge.         Left eye: No discharge.      Extraocular Movements: Extraocular movements intact.      Pupils: Pupils are equal, round, and reactive to light.   Cardiovascular:      Rate and Rhythm: Normal rate and regular rhythm.      Pulses: Normal pulses.      Heart sounds: Normal heart sounds.   Pulmonary:      Effort: Pulmonary effort is normal. No respiratory distress.      Breath sounds: Normal breath sounds. No stridor. No wheezing, rhonchi or rales.   Chest:      Chest  wall: No tenderness.   Musculoskeletal:         General: Swelling and tenderness present. No deformity or signs of injury.      Cervical back: Normal range of motion.        Feet:    Feet:      Right foot:      Skin integrity: Skin breakdown, erythema and warmth present.   Skin:     General: Skin is warm.      Capillary Refill: Capillary refill takes less than 2 seconds.      Findings: Erythema present.   Neurological:      General: No focal deficit present.      Mental Status: He is alert and oriented to person, place, and time.               ED Course   Labs Reviewed - No data to display                XR FOOT, COMPLETE (MIN 3 VIEWS), RIGHT (CPT=73630)    Result Date: 1/28/2024  CONCLUSION:  1. Macro right foot.  No destructive/erosive osseous changes are seen to suggest osteomyelitis radiographically. 2. Lesser incidental findings as above.   Dictated by (CST): Camilo Jesus MD on 1/28/2024 at 1:37 PM     Finalized by (CST): Camilo Jesus MD on 1/28/2024 at 1:38 PM                  MDM      Patient is a 73-year-old with significant past comorbidities of CAD, hypertension, hyperlipidemia, colon cancer, diabetes, neuropathy, PAD.    Patient has a known diagnosis of PAD with previous lower her arterial ultrasound done in 2018 that showed significant atherosclerotic disease to bilateral lower extremities.  Patient was seen and evaluated by a vascular surgeon who recommended lower extremity angiogram, however, patient declined.    Patient was seen by his PCP in October, note evaluated, at that time patient was with small healing ulcer to right lateral foot near fifth metatarsal head.  No drainage, discharge, erythema or warmth noted.  Per providers note, wife who is a wound care nurse says that wound has significantly improved.    Discussion had at time of visit in October to repeat ultrasound and give referral to vascular surgeon.  However, patient continues to decline any invasive procedure.  Patient was given  Lyrica twice a day to help with symptoms of peripheral neuropathy and chronic pain.  If no significant improvement, he may reconsider vascular workup.    Patient here today for worsening of wound.  Recent ultrasound performed 4 days ago with similar findings above.    Patient states foot is very painful to palpation.  States that he cannot even keep it covered with a sheet.  Denies any history of gout.  No recent changes to diet or water intake.  Patient foot with soft tissue swelling, erythema, warmth.    There is chronic ulceration to lateral border of fifth metatarsal, this is not acute finding.  Patient and wife state this area has not changed.  No drainage or discharge.  No fluctuance or induration.    Differentials considered include: Cellulitis versus osteomyelitis.    X-ray obtained to assess for bony demineralization, none seen.    Discussed case with supervising physician who evaluated uploaded images.  Agrees with diagnosis of cellulitis.  Due to patient's chronic comorbidities, one-time dose of Unasyn given via IV in immediate care.    Patient discharged home with Keflex and Bactrim.  He does have a follow-up appointment/new patient visit with PCP this Tuesday.    Immediate care provider reached out to PCP to notify of current treatment of suspected cellulitis and to reevaluate area on Tuesday.    Patient also requesting medication for pain, will prescribe short course of Norco.  Patient is aware to not take this medication while working, drinking, driving.  Take over-the-counter stool softener while taking medication.    Patient is aware of signs symptoms that warrant further evaluation in ER.  Him and his wife verbalized understanding.  All questions answered at time of discharge.                                 Medical Decision Making  Amount and/or Complexity of Data Reviewed  External Data Reviewed: labs and notes.  Radiology: ordered.        Disposition and Plan     Clinical Impression:  1.  Cellulitis of right lower extremity    2. Right foot pain         Disposition:  Discharge  1/28/2024  3:11 pm    Follow-up:  Aaron Helton MD  [address] [place] [id number]  [phone]    In 3 days  For wound re-check          Medications Prescribed:  Discharge Medication List as of 1/28/2024  3:04 PM        START taking these medications    Details   cephalexin 500 MG Oral Cap Take 1 capsule (500 mg total) by mouth 2 (two) times daily for 7 days., Normal, Disp-14 capsule, R-0      sulfamethoxazole-trimethoprim -160 MG Oral Tab per tablet Take 1 tablet by mouth 2 (two) times daily for 7 days., Normal, Disp-14 tablet, R-0      !! HYDROcodone-acetaminophen 5-325 MG Oral Tab Take 1 tablet by mouth every 8 (eight) hours as needed for Pain., Normal, Disp-15 tablet, R-0       !! - Potential duplicate medications found. Please discuss with provider.

## 2024-01-30 ENCOUNTER — OFFICE VISIT (OUTPATIENT)
Dept: INTERNAL MEDICINE CLINIC | Facility: CLINIC | Age: 74
End: 2024-01-30
Payer: MEDICARE

## 2024-01-30 VITALS
DIASTOLIC BLOOD PRESSURE: 70 MMHG | RESPIRATION RATE: 18 BRPM | BODY MASS INDEX: 28.9 KG/M2 | SYSTOLIC BLOOD PRESSURE: 148 MMHG | HEIGHT: 67 IN | HEART RATE: 58 BPM | OXYGEN SATURATION: 100 % | WEIGHT: 184.13 LBS

## 2024-01-30 DIAGNOSIS — E53.8 VITAMIN B12 DEFICIENCY: ICD-10-CM

## 2024-01-30 DIAGNOSIS — Z13.0 SCREENING FOR DEFICIENCY ANEMIA: ICD-10-CM

## 2024-01-30 DIAGNOSIS — I25.10 CORONARY ARTERY DISEASE INVOLVING NATIVE CORONARY ARTERY OF NATIVE HEART WITHOUT ANGINA PECTORIS: ICD-10-CM

## 2024-01-30 DIAGNOSIS — I10 ESSENTIAL HYPERTENSION WITH GOAL BLOOD PRESSURE LESS THAN 140/90: ICD-10-CM

## 2024-01-30 DIAGNOSIS — I73.9 PAD (PERIPHERAL ARTERY DISEASE) (HCC): Primary | ICD-10-CM

## 2024-01-30 DIAGNOSIS — Z13.89 SCREENING FOR NEPHROPATHY: ICD-10-CM

## 2024-01-30 DIAGNOSIS — I48.0 PAROXYSMAL ATRIAL FIBRILLATION (HCC): ICD-10-CM

## 2024-01-30 DIAGNOSIS — E78.5 HYPERLIPIDEMIA, UNSPECIFIED HYPERLIPIDEMIA TYPE: ICD-10-CM

## 2024-01-30 DIAGNOSIS — G62.9 PERIPHERAL POLYNEUROPATHY: ICD-10-CM

## 2024-01-30 DIAGNOSIS — C18.9 MALIGNANT NEOPLASM OF COLON, UNSPECIFIED PART OF COLON (HCC): ICD-10-CM

## 2024-01-30 DIAGNOSIS — Z13.29 SCREENING FOR THYROID DISORDER: ICD-10-CM

## 2024-01-30 DIAGNOSIS — E11.9 TYPE 2 DIABETES MELLITUS WITHOUT COMPLICATION, WITHOUT LONG-TERM CURRENT USE OF INSULIN (HCC): ICD-10-CM

## 2024-01-30 DIAGNOSIS — Z12.5 SCREENING FOR PROSTATE CANCER: ICD-10-CM

## 2024-01-30 PROCEDURE — 99215 OFFICE O/P EST HI 40 MIN: CPT | Performed by: INTERNAL MEDICINE

## 2024-01-30 NOTE — PATIENT INSTRUCTIONS
You are seen in clinic today for reevaluation of your lower extremity wound, ongoing left lower extremity pain.  This is primarily due to cholesterol buildup of the legs as well as neuropathy impact of the nerves to the area  - Suspect the peripheral artery disease is the predominant factor over the course of 10-15 years.   - My concern with the chronic wound is that it is not healing appropriately and can be worsening over time.  - We need to improve the blood flow to the area which requires:    Interventional radiology/vascular surgery evaluation given the blockages seen on the ultrasound  Start of possibly a blood thinner medication  Performance of a home exercise regimen to promote blood flow throughout the legs.  This should build up over time and improve your symptoms    See with Dr. Walton recommends in Interventional Radiology clinic    - For your peripheral neuropathy, we discussed stopping the lyrica which is reasonable  - Let's try vitamin B12 (cyanocobalamin) 1000 mcg once a day in the morning (over-the counter)  - Ok to try L-Citrruline    Lets keep a close eye on your back symptoms  -Would recommend initial trial of conservative therapy:    -Acetaminophen 500-650 mg every 4-6 hours as needed for pain relief  -For more severe pain,  continue   -Trial of physical therapy can be considered if home exercise regimen insufficient    Return to clinic in 6-8 weeks for Medicare annual physical examination

## 2024-01-30 NOTE — PROGRESS NOTES
Chief Complaint:   Chief Complaint   Patient presents with    Urgent Care F/u     1/28. Dx w/cellulitis.     Neuropathy    Medication Follow-Up     HPI:     Mr. REDD is a 73 year old male PMHX as below coming in for follow-up    Overall he is doing good. He did go to urgent care with his foot. Started off with concern of neuropathy. There was concern for a non healing ulcer in the R foot. PAD concerns now.     No fevers, +some chills. Drainage serosanginous but no yellow-green. MediHoney will be used soon. Zinc and neosporin.    Discolratoin started first in the early part of July. IT was closed at that time and didn't open until til December.    Not currently taking taking any blood thinners. Had bad bruising/bleeding.     Lyrica seemed to help right after starting it. Then he had swelling and had to come off of it. Did see a chiropractic.     PMHx  CAD  Status post CABG    Paroxysmal A-fib: Declining use of anticoagulation.  Pacemaker in place    Type 2 diabetes: Denies any referrals for ophthalmology    Hypertension    Hyperlipidemia  - History of colon cancer: Status post hemicolectomy 2019.  - No new recurrences, patient declining any further colonoscopies    Peripheral arterial disease: Significant atherosclerotic disease bilateral lower extremities.  Patient declined angiogram that was recommended.    Malignant neoplasm of colon    Peripheral neuropathy: Trial Lyrica 50 mg twice a day    Spinal stenosis of lumbar region    Past Medical History:   Diagnosis Date    Atherosclerosis of coronary artery     CAD (coronary artery disease)     Cancer (HCC)     Colon cancer (HCC)     COVID-19 virus infection     Oct 2020    Diabetes (HCC)     DM2 (diabetes mellitus, type 2) (HCC)     Essential hypertension     HTN (hypertension)     Hyperlipidemia     Other abnormal glucose     rubber band hemorroid    Paroxysmal atrial fibrillation (HCC)      Past Surgical History:   Procedure Laterality Date    OTHER      MAZE     OTHER      CABAG    OTHER      appendectomy    OTHER      pacer    OTHER       Status post hemicolectomy, July 2019     Social History:  Social History     Socioeconomic History    Marital status:    Tobacco Use    Smoking status: Former    Smokeless tobacco: Never   Vaping Use    Vaping Use: Every day    Substances: Nicotine    Devices: Refillable tank   Substance and Sexual Activity    Alcohol use: No     Alcohol/week: 0.0 standard drinks of alcohol    Drug use: No     Family History:  History reviewed. No pertinent family history.  Allergies:  No Known Allergies  Current Meds:  Current Outpatient Medications   Medication Sig Dispense Refill    cephalexin 500 MG Oral Cap Take 1 capsule (500 mg total) by mouth 2 (two) times daily for 7 days. 14 capsule 0    sulfamethoxazole-trimethoprim -160 MG Oral Tab per tablet Take 1 tablet by mouth 2 (two) times daily for 7 days. 14 tablet 0    metoprolol succinate  MG Oral Tablet 24 Hr Take 1 tablet (100 mg total) by mouth 2 (two) times daily. 180 tablet 1    ALPRAZolam 0.5 MG Oral Tab Take 1 tablet (0.5 mg total) by mouth 2 (two) times daily as needed for Anxiety or Sleep. 60 tablet 5    SPIRONOLACTONE 25 MG Oral Tab TAKE 1 TABLET(25 MG) BY MOUTH DAILY 90 tablet 3    GLIPIZIDE ER 10 MG Oral Tablet 24 Hr TAKE 1 TABLET(10 MG) BY MOUTH DAILY 90 tablet 3    LISINOPRIL 20 MG Oral Tab TAKE 1 TABLET(20 MG) BY MOUTH TWICE DAILY 180 tablet 3    pregabalin (LYRICA) 50 MG Oral Cap Take 1 capsule (50 mg total) by mouth 2 (two) times daily. (Patient not taking: Reported on 1/30/2024) 60 capsule 3    HYDROcodone-acetaminophen (NORCO) 5-325 MG Oral Tab Take 1 tablet by mouth every 6 (six) hours as needed for Pain. (Patient not taking: Reported on 1/30/2024) 30 tablet 0    Glucose Blood (ONETOUCH ULTRA BLUE) In Vitro Strip Check BS once daily1 (Patient not taking: Reported on 1/30/2024) 100 strip 3    ONETOUCH DELICA LANCETS 33G Does not apply Misc Test BS once daily  (Patient not taking: Reported on 1/30/2024) 100 each 3      Counseling given: Not Answered       REVIEW OF SYSTEMS:   Positive Findings indicated in BOLD    Constitutional: Fever, Chills, Weight Gain, Weight Loss, Night Sweats, Fatigue, Malaise  ENT/Mouth:  Hearing Changes, Ear Pain, Nasal Congestion, Sinus Pain, Hoarseness, Sore throat, Rhinorrhea, Swallowing Difficulty  Eyes: Eye Pain, Swelling, Redness, Foreign Body, Discharge, Vision Changes  Cardiovascular: Chest Pain, SOB, PND, Dyspnea on Exertion, Orthopnea, Claudication, Edema, Palpitations  Respiratory: Cough, Sputum, Wheezing, Shortness of breath  Gastrointestinal: Nausea, Vomiting, Diarrhea, Constipation, Pain, Heartburn, Dysphagia, Bloody stools, Tarry stools  Genitourinary: Dysmenorrhea, Dysuria, Urinary Frequency, Hematuria, Urinary Incontinence, Urgency,  Flank Pain  Musculoskeletal: Arthralgias, Myalgias, Joint Swelling, Joint Stiffness, Back Pain, Neck Pain  Integumentary: Skin Lesions, Pruritis, Hair Changes, Jaundice, Nail changes  Neuro: Weakness, Numbness, Paresthesias, Loss of Consciousness, Syncope, Dizziness, Headache, Falls  Psych: Anxiety, Depression, Insomnia, Suicidal Ideation, Homicidal ideation, Memory Changes  Heme/Lymph: Bruising, Bleeding, Lymphadenopathy  Endocrine: Polyuria, Polydipsia, Temperature Intolerance    EXAM:   Vital Signs:  Blood pressure 148/70, pulse 58, resp. rate 18, height 5' 7\" (1.702 m), weight 184 lb 2 oz (83.5 kg), SpO2 100%.     Constitutional: No acute distress. Alert and oriented x 3.  Eyes: EOMI, PERRLA, clear sclera b/l  HENT: NCAT, Moist mucous membranes, Oropharynx without erythema or exudates  Neck: No JVD, no thyromegaly  Cardiovascular: S1, S2, no S3, no S4, Regular rate and rhythm, No murmurs/gallops/rubs.   Vascular: Equal pulses 2+ carotids no bruits or thrills/radial/DP/PT bilaterally  Respiratory: Clear to auscultation bilaterally.  No wheezes/rales/rhonchi  Gastrointestinal: Soft, nontender,  nondistended. Positive bowel sounds x 4. No rebound tenderness. No hepatomegaly, No splenomegaly  Genitourinary: No CVA tenderness bilaterally  Neurologic: No focal neurological deficits, CN II-XII intact, light touch intact, MSK Strength 5/5 and symmetric in all extremities, normal gait, 2+ patellar tendon  Musculoskeletal: Full range of motion of all extremities, no clubbing/swelling/edema  Skin:   + R leg fifth MTP lateral ulcer, with granulation tissue, no surrounding erythema  Smaller linear ulcer of the big toe+, tender to palpation  Psychiatric: Appropriate mood and affect  Heme/Lymph/Immune: No cervical LAD      DATA REVIEWED   Labs:  Recent Results (from the past 8760 hour(s))   Comp Metabolic Panel (14) [E]    Collection Time: 04/19/23  3:46 PM   Result Value Ref Range    Glucose 119 (H) 70 - 99 mg/dL    Sodium 142 136 - 145 mmol/L    Potassium 3.9 3.5 - 5.1 mmol/L    Chloride 107 98 - 112 mmol/L    CO2 30.0 21.0 - 32.0 mmol/L    Anion Gap 5 0 - 18 mmol/L    BUN 19 (H) 7 - 18 mg/dL    Creatinine 1.43 (H) 0.70 - 1.30 mg/dL    BUN/CREA Ratio 13.3 10.0 - 20.0    Calcium, Total 9.5 8.5 - 10.1 mg/dL    Calculated Osmolality 297 (H) 275 - 295 mOsm/kg    eGFR-Cr 53 (L) >=60 mL/min/1.73m2    ALT 24 16 - 61 U/L    AST 13 (L) 15 - 37 U/L    Alkaline Phosphatase 70 45 - 117 U/L    Bilirubin, Total 0.9 0.1 - 2.0 mg/dL    Total Protein 7.5 6.4 - 8.2 g/dL    Albumin 3.9 3.4 - 5.0 g/dL    Globulin  3.6 2.8 - 4.4 g/dL    A/G Ratio 1.1 1.0 - 2.0    Patient Fasting for CMP? Yes      *Note: Due to a large number of results and/or encounters for the requested time period, some results have not been displayed. A complete set of results can be found in Results Review.       Recent Results (from the past 8760 hour(s))   CBC W/ DIFFERENTIAL    Collection Time: 04/19/23  3:46 PM   Result Value Ref Range    WBC 6.8 4.0 - 11.0 x10(3) uL    RBC 4.81 3.80 - 5.80 x10(6)uL    HGB 13.8 13.0 - 17.5 g/dL    HCT 41.1 39.0 - 53.0 %    MCV  85.4 80.0 - 100.0 fL    MCH 28.7 26.0 - 34.0 pg    MCHC 33.6 31.0 - 37.0 g/dL    RDW-SD 42.5 35.1 - 46.3 fL    RDW 13.5 11.0 - 15.0 %    .0 (L) 150.0 - 450.0 10(3)uL    Neutrophil Absolute Prelim 4.80 1.50 - 7.70 x10 (3) uL    Neutrophil Absolute 4.80 1.50 - 7.70 x10(3) uL    Lymphocyte Absolute 1.06 1.00 - 4.00 x10(3) uL    Monocyte Absolute 0.62 0.10 - 1.00 x10(3) uL    Eosinophil Absolute 0.23 0.00 - 0.70 x10(3) uL    Basophil Absolute 0.07 0.00 - 0.20 x10(3) uL    Immature Granulocyte Absolute 0.02 0.00 - 1.00 x10(3) uL    Neutrophil % 70.6 %    Lymphocyte % 15.6 %    Monocyte % 9.1 %    Eosinophil % 3.4 %    Basophil % 1.0 %    Immature Granulocyte % 0.3 %     *Note: Due to a large number of results and/or encounters for the requested time period, some results have not been displayed. A complete set of results can be found in Results Review.       Imaging:  Duplex ultrasound 1/20/2024    Impression   CONCLUSION:  1. On the right, there is no hemodynamically significant aortoiliac inflow disease.  There is focal occlusion of the mid superficial femoral artery.  There is single-vessel runoff via the anterior tibial artery.  The peroneal artery reconstitutes  distally.  2. On the left, there is greater than 70 percent aortoiliac inflow disease.  The main infrapopliteal runoff vessels are all occluded..            Right foot x-ray 1/28/2024    Impression   CONCLUSION:  1. Macro right foot.  No destructive/erosive osseous changes are seen to suggest osteomyelitis radiographically.  2. Lesser incidental findings as above.              ASSESSMENT AND PLAN:     Chronic nonhealing wound  Peripheral arterial disease  Ongoing over the course of months, initially patient declined any sort of invasive management  -Ultrasound duplex 1/24/2024: Focal occlusion of the mid superficial femoral artery with single-vessel runoff via anterior tibial artery and peroneal artery.  On the left there is greater than 70% aortoiliac  inflow disease, main infrapopliteal runoff vessels are all occluded  - Advised interventional radiology evaluation, my concern is that this can progress further over time and can lead to ischemic arterial disease with worsening pain, worsening neuropathy, and possible ischemia/amputation of tissue.  - Would benefit from the use of aspirin, but we can  - Dedicated walking program to help promote further blood flow  - Recommended ongoing use of Medihoney, try to avoid the use of ointments and creams to avoid risk for infection in the future.    CAD  Status post CABG  - Would benefit from use of aspirin  - On metoprolol 100 mg twice a day    Paroxysmal A-fib  Status post Maze procedure.  - On metoprolol 100 mg twice a day  - Declining use of anticoagulation.    - Pacemaker in place  - Continues to refuse any follow-up with cardiology    Type 2 diabetes:   Recent A1c:   HEMOGLOBIN A1C (%)   Date Value   10/18/2023 6.8 (A)     Recent urine microalbumin: No components found for: \"URINEMICROALBUMIN\"  Current medications: Glipizide 10 mg daily  Eye exam: Denies any referrals for ophthalmology  Foot exam: May benefit from podiatry evaluation    Hypertension  -Blood pressure today at goal  - Check blood pressures at home  - Continue with home metoprolol, lisinopril 20 mg daily, spironolactone 25 mg daily    Hyperlipidemia  - Repeat fasting lipid panel prior to next visit  - Not currently on statin    History of colon cancer  Status post hemicolectomy 2019.  - No new recurrences, patient declining any further colonoscopies    Peripheral neuropathy  - Can be contributing to lower extremity symptoms.  - Trial Lyrica 50 mg twice a day - not currently taking  -We will check a vitamin B12 level, will try to supplement with vitamin B12, cyanocobalamin 1000 mcg once a day.    Spinal stenosis of lumbar region  Chronic intermittent low back pain  -Would recommend initial trial of conservative therapy:    -Acetaminophen 500-650 mg every  4-6 hours as needed for pain relief  -Ibuprofen 200-400 mg every 8 hours as needed for anti-inflammatory and pain relief  -For more severe pain, on Norco as needed  -Trial of physical therapy can be considered if home exercise regimen insufficient           Orders This Visit:  Orders Placed This Encounter   Procedures    Vitamin B12 [E]    Urinalysis with Culture Reflex    TSH W Reflex To Free T4    Microalb/Creat Ratio, Random Urine    Lipid Panel    Hemoglobin A1C    Comp Metabolic Panel (14)    CEA [E]    CBC With Differential With Platelet       Meds This Visit:  Requested Prescriptions      No prescriptions requested or ordered in this encounter       Imaging & Referrals:  None     Health Maintenance  Return to clinic in 6-8 weeks for Medicare annual physical examination    Spent 45 minutes obtaining history, evaluating patient, discussing treatment options, diet, exercise, review of available labs and radiology reports, and completing documentation.     Aaron Helton MD, 01/30/24, 3:12 PM

## 2024-02-06 ENCOUNTER — HOSPITAL ENCOUNTER (OUTPATIENT)
Dept: CT IMAGING | Facility: HOSPITAL | Age: 74
Discharge: HOME | End: 2024-02-06
Attending: RADIOLOGY
Payer: MEDICARE

## 2024-02-06 DIAGNOSIS — I73.9 PAD (PERIPHERAL ARTERY DISEASE) (HCC): ICD-10-CM

## 2024-02-06 LAB
CREAT BLD-MCNC: 1.7 MG/DL
EGFRCR SERPLBLD CKD-EPI 2021: 43 ML/MIN/1.73M2 (ref 60–?)

## 2024-02-06 PROCEDURE — 82565 ASSAY OF CREATININE: CPT

## 2024-02-06 PROCEDURE — 73706 CT ANGIO LWR EXTR W/O&W/DYE: CPT | Performed by: RADIOLOGY

## 2024-02-07 ENCOUNTER — HOSPITAL ENCOUNTER (OUTPATIENT)
Dept: INTERVENTIONAL RADIOLOGY/VASCULAR | Facility: HOSPITAL | Age: 74
Discharge: HOME | End: 2024-02-07
Attending: RADIOLOGY | Admitting: RADIOLOGY
Payer: MEDICARE

## 2024-02-07 VITALS
DIASTOLIC BLOOD PRESSURE: 65 MMHG | TEMPERATURE: 98 F | OXYGEN SATURATION: 99 % | RESPIRATION RATE: 18 BRPM | HEIGHT: 66.5 IN | BODY MASS INDEX: 28.27 KG/M2 | SYSTOLIC BLOOD PRESSURE: 155 MMHG | HEART RATE: 54 BPM | WEIGHT: 178 LBS

## 2024-02-07 DIAGNOSIS — I73.9 PAD (PERIPHERAL ARTERY DISEASE) (HCC): ICD-10-CM

## 2024-02-07 LAB
ANION GAP SERPL CALC-SCNC: 6 MMOL/L (ref 0–18)
BASOPHILS # BLD AUTO: 0.07 X10(3) UL (ref 0–0.2)
BASOPHILS NFR BLD AUTO: 1.1 %
BUN BLD-MCNC: 26 MG/DL (ref 9–23)
BUN/CREAT SERPL: 15.8 (ref 10–20)
CALCIUM BLD-MCNC: 9.6 MG/DL (ref 8.7–10.4)
CHLORIDE SERPL-SCNC: 110 MMOL/L (ref 98–112)
CO2 SERPL-SCNC: 23 MMOL/L (ref 21–32)
CREAT BLD-MCNC: 1.65 MG/DL
DEPRECATED RDW RBC AUTO: 43.8 FL (ref 35.1–46.3)
EGFRCR SERPLBLD CKD-EPI 2021: 44 ML/MIN/1.73M2 (ref 60–?)
EOSINOPHIL # BLD AUTO: 0.23 X10(3) UL (ref 0–0.7)
EOSINOPHIL NFR BLD AUTO: 3.5 %
ERYTHROCYTE [DISTWIDTH] IN BLOOD BY AUTOMATED COUNT: 14.3 % (ref 11–15)
FASTING STATUS PATIENT QL REPORTED: YES
GLUCOSE BLD-MCNC: 210 MG/DL (ref 70–99)
GLUCOSE BLDC GLUCOMTR-MCNC: 193 MG/DL (ref 70–99)
HCT VFR BLD AUTO: 43.7 %
HGB BLD-MCNC: 14.6 G/DL
IMM GRANULOCYTES # BLD AUTO: 0.02 X10(3) UL (ref 0–1)
IMM GRANULOCYTES NFR BLD: 0.3 %
ISTAT ACTIVATED CLOTTING TIME: 244 SECONDS (ref 125–137)
LYMPHOCYTES # BLD AUTO: 0.87 X10(3) UL (ref 1–4)
LYMPHOCYTES NFR BLD AUTO: 13.4 %
MCH RBC QN AUTO: 28 PG (ref 26–34)
MCHC RBC AUTO-ENTMCNC: 33.4 G/DL (ref 31–37)
MCV RBC AUTO: 83.7 FL
MONOCYTES # BLD AUTO: 0.46 X10(3) UL (ref 0.1–1)
MONOCYTES NFR BLD AUTO: 7.1 %
NEUTROPHILS # BLD AUTO: 4.85 X10 (3) UL (ref 1.5–7.7)
NEUTROPHILS # BLD AUTO: 4.85 X10(3) UL (ref 1.5–7.7)
NEUTROPHILS NFR BLD AUTO: 74.6 %
OSMOLALITY SERPL CALC.SUM OF ELEC: 299 MOSM/KG (ref 275–295)
PLATELET # BLD AUTO: 149 10(3)UL (ref 150–450)
POTASSIUM SERPL-SCNC: 4.9 MMOL/L (ref 3.5–5.1)
RBC # BLD AUTO: 5.22 X10(6)UL
SODIUM SERPL-SCNC: 139 MMOL/L (ref 136–145)
WBC # BLD AUTO: 6.5 X10(3) UL (ref 4–11)

## 2024-02-07 PROCEDURE — 82962 GLUCOSE BLOOD TEST: CPT

## 2024-02-07 PROCEDURE — 99153 MOD SED SAME PHYS/QHP EA: CPT | Performed by: RADIOLOGY

## 2024-02-07 PROCEDURE — 85025 COMPLETE CBC W/AUTO DIFF WBC: CPT | Performed by: RADIOLOGY

## 2024-02-07 PROCEDURE — 99152 MOD SED SAME PHYS/QHP 5/>YRS: CPT | Performed by: RADIOLOGY

## 2024-02-07 PROCEDURE — 80048 BASIC METABOLIC PNL TOTAL CA: CPT | Performed by: RADIOLOGY

## 2024-02-07 PROCEDURE — X27H385 DILATION OF RIGHT FEMORAL ARTERY WITH SUSTAINED RELEASE DRUG-ELUTING INTRALUMINAL DEVICE, PERCUTANEOUS APPROACH, NEW TECHNOLOGY GROUP 5: ICD-10-PCS | Performed by: RADIOLOGY

## 2024-02-07 PROCEDURE — 85347 COAGULATION TIME ACTIVATED: CPT

## 2024-02-07 PROCEDURE — 37226 HC TRANSLUM ANGIOPLASTY W STENT FEM POP UNILAT: CPT | Performed by: RADIOLOGY

## 2024-02-07 RX ORDER — SODIUM CHLORIDE 0.9 % (FLUSH) 0.9 %
10 SYRINGE (ML) INJECTION AS NEEDED
Status: DISCONTINUED | OUTPATIENT
Start: 2024-02-07 | End: 2024-02-07

## 2024-02-07 RX ORDER — PROTAMINE SULFATE 10 MG/ML
INJECTION, SOLUTION INTRAVENOUS
Status: COMPLETED
Start: 2024-02-07 | End: 2024-02-07

## 2024-02-07 RX ORDER — HEPARIN SODIUM 1000 [USP'U]/ML
INJECTION, SOLUTION INTRAVENOUS; SUBCUTANEOUS
Status: COMPLETED
Start: 2024-02-07 | End: 2024-02-07

## 2024-02-07 RX ORDER — CLOPIDOGREL BISULFATE 75 MG/1
75 TABLET ORAL DAILY
Qty: 42 TABLET | Refills: 0 | Status: SHIPPED | OUTPATIENT
Start: 2024-02-07 | End: 2024-03-20

## 2024-02-07 RX ORDER — LIDOCAINE HYDROCHLORIDE 20 MG/ML
INJECTION, SOLUTION EPIDURAL; INFILTRATION; INTRACAUDAL; PERINEURAL
Status: COMPLETED
Start: 2024-02-07 | End: 2024-02-07

## 2024-02-07 RX ORDER — ASPIRIN 81 MG/1
81 TABLET ORAL DAILY
Qty: 90 TABLET | Refills: 3 | Status: SHIPPED | OUTPATIENT
Start: 2024-02-07

## 2024-02-07 RX ORDER — MIDAZOLAM HYDROCHLORIDE 1 MG/ML
INJECTION INTRAMUSCULAR; INTRAVENOUS
Status: COMPLETED
Start: 2024-02-07 | End: 2024-02-07

## 2024-02-07 RX ORDER — MIDAZOLAM HYDROCHLORIDE 1 MG/ML
INJECTION INTRAMUSCULAR; INTRAVENOUS
Status: DISCONTINUED
Start: 2024-02-07 | End: 2024-02-07 | Stop reason: WASHOUT

## 2024-02-07 RX ORDER — CLOPIDOGREL BISULFATE 75 MG/1
TABLET ORAL
Status: COMPLETED
Start: 2024-02-07 | End: 2024-02-07

## 2024-02-07 RX ORDER — SODIUM CHLORIDE 9 MG/ML
INJECTION, SOLUTION INTRAVENOUS CONTINUOUS
Status: DISCONTINUED | OUTPATIENT
Start: 2024-02-07 | End: 2024-02-07

## 2024-02-07 NOTE — IVS NOTE
DISCHARGE NOTE      Pt is able to sit up and ambulate without difficulty.   Pt voided and tolerated fluids and food.   Procedural site remains dry and intact with good circulation, motion, and sensation.   No signs and symptoms of bleeding/hematoma noted.   Instruction provided, patient/family verbalizes understanding.  IV removed. Band-aid applied.    Dr. Greenfield spoke with patient/family post procedure.      Follow up Appointment: Made on 2/21/2024   Prescription sent and available for pick-up  Patient transported to [facility] via wheelchair. Patient's family is driving patient's home.

## 2024-02-07 NOTE — BRIEF PROCEDURE NOTE
[facility]  Procedure Note    Geovanny Prabhakar Patient Status:  Outpatient    1950 MRN [id number]   Location [facility] INTERVENTIONAL SUITES Attending Tyler Walton MD   Hosp Day # 0 PCP Aaron Helton MD     Procedure: RLE arteriogram/ SFA PTA/stent    Pre-Procedure Diagnosis:  PAD with toe ulcers ([place])    Post-Procedure Diagnosis: same    Anesthesia:  Sedation    Findings:  chronic long segment SFA occlusion, repaired c PTA/stent.  Distal tibial occlusions    Specimens: 0    Blood Loss:  minimal      Complications:  None      Taiwo Greenfield MD  2024

## 2024-02-07 NOTE — INTERVAL H&P NOTE
The above referenced H&P was reviewed by Taiwo Greenfield MD on 2/7/2024, the patient was examined and no significant changes have occurred in the patient's condition since the H&P was performed.  Risks, benefits, alternative treatments and consequences of no treatment were discussed.  We will proceed with procedure as planned.      Taiwo Greenfield MD  2/7/2024  9:17 AM

## 2024-02-07 NOTE — PRE-SEDATION ASSESSMENT
[facility]  IR Pre-Procedure Sedation Assessment    History of snoring or sleep or apnea?   No    History of previous problems with anesthesia or sedation  No    Physical Findings:  Neck: nl ROM  CV: RRR  PULM: normal respiratory rate/effort    Mallampati Score:  II (hard and soft palate, upper portion of tonsils anduvula visible)    ASA Classification:   3. Patient with severe systemic disease    Plan:   -IV moderate sedation    Taiwo Greenfield MD

## 2024-02-07 NOTE — DISCHARGE INSTRUCTIONS
HOME CARE INSTRUCTIONS FOLLOWING PERIPHERAL ANGIOGRAPHY, ANGIOPLASTY (PTCA/PTA) OR INSERTION OF STENT IN THE PERIPHERAL ARTERIES      Activity     DO NOT drive after the procedure.  You may resume driving late the following day according to the nurse or physician's instructions  Plan on resting and relaxing tonight and tomorrow  Resume your normal activity after 48 hours, or as instructed by your physician  Do not lift push or pull anything over 10 pounds for 1 WEEK  Avoid repeated stair use and excessive walking for the next 24 hours.  Avoid repetitive squatting/bending exercises/motions for 1 week.   Avoid drinking alcohol for the next 24 hours      What is Normal?    A small lump at the procedure site associated with mild tenderness when touched  The procedure site may be bruised or discolored  There may be a small amount of drainage on the bandage    Special Instructions    Drink plenty of fluids during the next 24 hours to \"flush\" the contrast from your system  Keep the bandage clean and dry   After 24 hours, remove the bandage   You may shower after removing the bandage, and wash the procedure site gently with soap and water  Do NOT apply any powders, ointments or creams to the site for 1 week.   DO NOT submerge the procedure site for 1 WEEK (no bath tubs or pools)  If you choose to wear a bandage for a few days, make sure it remains clean and dry and that it is changed daily    Additional Instructions:  Do not take glucophage/metformin containing products for at least 48 hours after procedure, unless otherwise directed by your physician.    When you should NOTIFY YOUR PHYSICIAN    Bleeding can occur at the procedure site - both on the outside of the skin and/or beneath the surface of the skin  Swelling or a large lump at the procedure site can occur, which may be accompanied by moderate to severe pain    If either of the above occurs, lie down flat.    Have someone apply pressure to the procedure site with  both hands, as instructed by the nurse.    Hold pressure for 20 minutes and the bleeding should stop.    Notify your physician of the occurrence  If the bleeding does not stop, call 911 and continue to apply pressure    If you experience signs of a fever, temperature > 101°, chills, infection (redness, swelling, thick yellow drainage, or a foul odor from the procedure site)  If you notice any numbness, tingling, or loss of feeling to your leg or foot or groin access      You Received a Stent:    You will remain on an antiplatelet drug and/or aspirin.  Antiplatelet medications are taken for 6 weeks as ordered by Dr. Greenfield, and should not be stopped unless your doctor directs you to do so.  These medications help to prevent blockage at the stent site.  If another physician or dentist asks you to stop your antiplatelet medication, you need to consult your doctor first.  Together, your physician can discuss the risks that may be involved if you are not taking the antiplatelet medication   If an MRI is necessary, it may be done 4-6 weeks after your procedure.  Verify this with your doctor  Keep your stent card with you at all times!  If you need an MRI in the future, your stent card will need to be shown to the technologist before performing the MRI.  A duplicate card CANNOT be reproduced.    Other    You may resume your present diet, unless otherwise specified by your physician.  You may resume all of your medications as prescribed, unless otherwise directed by your physician.  A list of your medications was provided to you at discharge.  Continue the walking program initiated in the hospital and progress your walking as directed.  Or, gradually resume your previous aerobic exercise schedule as tolerated.  Please call your physician’s office for a follow-up appointment.

## 2024-02-21 DIAGNOSIS — I73.9 PAD (PERIPHERAL ARTERY DISEASE) (HCC): Primary | ICD-10-CM

## 2024-02-21 DIAGNOSIS — E11.69 TYPE 2 DIABETES MELLITUS WITH OTHER SPECIFIED COMPLICATION, UNSPECIFIED WHETHER LONG TERM INSULIN USE (HCC): ICD-10-CM

## 2024-02-21 DIAGNOSIS — S81.801A WOUND OF RIGHT LOWER EXTREMITY, INITIAL ENCOUNTER: ICD-10-CM

## 2024-02-22 RX ORDER — ALPRAZOLAM 0.5 MG/1
0.5 TABLET ORAL 2 TIMES DAILY PRN
Qty: 60 TABLET | Refills: 5 | Status: CANCELLED | OUTPATIENT
Start: 2024-02-22

## 2024-02-22 NOTE — TELEPHONE ENCOUNTER
Pt has 1 more refill on file for 2/29    Current refill request refused due to refill is either a duplicate request or has active refills at the pharmacy.  Check previous templates.    Requested Prescriptions     Pending Prescriptions Disp Refills    ALPRAZolam 0.5 MG Oral Tab 60 tablet 5     Sig: Take 1 tablet (0.5 mg total) by mouth 2 (two) times daily as needed for Anxiety or Sleep.

## 2024-03-06 ENCOUNTER — OFFICE VISIT (OUTPATIENT)
Dept: PODIATRY CLINIC | Facility: CLINIC | Age: 74
End: 2024-03-06
Payer: MEDICARE

## 2024-03-06 DIAGNOSIS — M79.671 RIGHT FOOT PAIN: ICD-10-CM

## 2024-03-06 DIAGNOSIS — L97.512 DIABETIC ULCER OF OTHER PART OF RIGHT FOOT ASSOCIATED WITH TYPE 2 DIABETES MELLITUS, WITH FAT LAYER EXPOSED (HCC): Primary | ICD-10-CM

## 2024-03-06 DIAGNOSIS — E11.42 TYPE 2 DIABETES MELLITUS WITH POLYNEUROPATHY (HCC): ICD-10-CM

## 2024-03-06 DIAGNOSIS — Z98.890 PERIPHERAL ARTERIAL DISEASE WITH HISTORY OF REVASCULARIZATION (HCC): ICD-10-CM

## 2024-03-06 DIAGNOSIS — I73.9 PERIPHERAL ARTERIAL DISEASE WITH HISTORY OF REVASCULARIZATION (HCC): ICD-10-CM

## 2024-03-06 DIAGNOSIS — E11.621 DIABETIC ULCER OF OTHER PART OF RIGHT FOOT ASSOCIATED WITH TYPE 2 DIABETES MELLITUS, WITH FAT LAYER EXPOSED (HCC): Primary | ICD-10-CM

## 2024-03-06 PROCEDURE — 99204 OFFICE O/P NEW MOD 45 MIN: CPT | Performed by: PODIATRIST

## 2024-03-06 PROCEDURE — L3260 AMBULATORY SURGICAL BOOT EAC: HCPCS | Performed by: PODIATRIST

## 2024-03-06 NOTE — PROGRESS NOTES
[facility]  Progress Note    Geovanny Prabhakar is a 73 year old male.   Chief Complaint   Patient presents with    Wound Care     Consult right foot wound at the base of 5th toe- pain  7/10. Onset 8 weeks  ago. Had a stent put in 4 weeks ago.    Fungus Nails     Right foot toenails discoloration and thick. On 10/18/23 A1C= 6.8, does not check BS.         HPI:     This is a pleasant type 2 diabetic male with paroxysmal Afib, CAD.  He is a former smoker but vapes currently.  He does PAD with revascularization surgery.  He is on plavix and lyrica.      He does have neuropathy to his feet.      He presents to clinic today due to right foot wound which has been present for about 8 weeks.  His wife has been treating the wound with aquacel and collagen and has been improving.  He states it is painful.        Allergies: Patient has no known allergies.   Current Outpatient Medications   Medication Sig Dispense Refill    aspirin 81 MG Oral Tab EC Take 1 tablet (81 mg total) by mouth daily. 90 tablet 3    clopidogrel 75 MG Oral Tab Take 1 tablet (75 mg total) by mouth daily. 42 tablet 0    metoprolol succinate  MG Oral Tablet 24 Hr Take 1 tablet (100 mg total) by mouth 2 (two) times daily. 180 tablet 1    ALPRAZolam 0.5 MG Oral Tab Take 1 tablet (0.5 mg total) by mouth 2 (two) times daily as needed for Anxiety or Sleep. 60 tablet 5    SPIRONOLACTONE 25 MG Oral Tab TAKE 1 TABLET(25 MG) BY MOUTH DAILY 90 tablet 3    GLIPIZIDE ER 10 MG Oral Tablet 24 Hr TAKE 1 TABLET(10 MG) BY MOUTH DAILY 90 tablet 3    LISINOPRIL 20 MG Oral Tab TAKE 1 TABLET(20 MG) BY MOUTH TWICE DAILY 180 tablet 3    Glucose Blood (ONETOUCH ULTRA BLUE) In Vitro Strip Check BS once daily1 100 strip 3    ONETOUCH DELICA LANCETS 33G Does not apply Misc Test BS once daily 100 each 3    pregabalin (LYRICA) 50 MG Oral Cap Take 1 capsule (50 mg total) by mouth 2 (two) times daily. (Patient not taking: Reported on 1/30/2024) 60 capsule 3     HYDROcodone-acetaminophen (NORCO) 5-325 MG Oral Tab Take 1 tablet by mouth every 6 (six) hours as needed for Pain. (Patient not taking: Reported on 3/6/2024) 30 tablet 0      Past Medical History:   Diagnosis Date    Atherosclerosis of coronary artery     CAD (coronary artery disease)     Cancer (HCC)     Colon cancer (HCC)     COVID-19 virus infection     Oct 2020    Diabetes (HCC)     DM2 (diabetes mellitus, type 2) (HCC)     Essential hypertension     HTN (hypertension)     Hyperlipidemia     Other abnormal glucose     rubber band hemorroid    Paroxysmal atrial fibrillation (HCC)       Past Surgical History:   Procedure Laterality Date    OTHER      MAZE    OTHER      CABAG    OTHER      appendectomy    OTHER      pacer    OTHER       Status post hemicolectomy, July 2019      No family history on file.   Social History     Socioeconomic History    Marital status:    Tobacco Use    Smoking status: Former    Smokeless tobacco: Never   Vaping Use    Vaping Use: Every day    Substances: Nicotine    Devices: Refillable tank   Substance and Sexual Activity    Alcohol use: No     Alcohol/week: 0.0 standard drinks of alcohol    Drug use: No           REVIEW OF SYSTEMS:   Denies nausea, fever, chills  No calf pain  No other muscle or joint aches  Denies chest pain or shortness of breath.      EXAM:   There were no vitals taken for this visit.    Constitutional:   Patient in no apparent distress. Well kept. Of normal body habitus. Alert and oriented to person, place, and time. No nutritional deficiencies noted. Adequate functional status.     Vascular Examination:  DP pulse is NP  PT pulse is NP  Capillary refill is adequate  Edema is present b/l LE     Integumentary Examination:  The patient's nails appear incurvated, thickened, elongated, dystrophic, discolored with subungual debris 1-5  right, 1-5  left nails.  Skin is of diminished texture and decreased  turgor.    Ulceration:     Etiology of ulceration:   Neuropathic        Location & Measurements of each wound L x W x D in cm (before debridement if performed)     Wound A location: Right lateral 5th met head Length: 0.5cm x Width: 0.3cm x Depth: 0.3 cm       Description of the wound: fibrotic  Description of exudate: none  no evidence of infection   no evidence of undermining   no evidence of tunneling   yes evidence of reduced circulation  If evidence of infection is present document treatment/response:      Required for Non-Pressure Ulcers-Depth of Ulcer (each wound)  Limited to breakdown of skin:   Subcutaneous tissue exposed: yes  Muscle/Tendon Exposed without necrosis:  with necrosis:   Bone exposed without necrosis:  with necrosis:      Neurological Examination:  Monofilament (10-g) sensation is intact  Sharp/dull is intact  Parasthesias present     Musculoskeletal Examination:  Muscle Strength is 5/5    POP to right foot ulcer      LABS & IMAGING:     Lab Results   Component Value Date     (H) 02/07/2024    BUN 26 (H) 02/07/2024    CREATSERUM 1.65 (H) 02/07/2024    BUNCREA 15.8 02/07/2024    ANIONGAP 6 02/07/2024    GFRAA 56 (L) 05/11/2022    GFRNAA 49 (L) 05/11/2022    CA 9.6 02/07/2024     02/07/2024    K 4.9 02/07/2024     02/07/2024    CO2 23.0 02/07/2024    OSMOCALC 299 (H) 02/07/2024        Lab Results   Component Value Date     (H) 04/19/2023    A1C 6.8 (A) 10/18/2023        No results found.     ASSESSMENT AND PLAN:   Diagnoses and all orders for this visit:    Diabetic ulcer of other part of right foot associated with type 2 diabetes mellitus, with fat layer exposed (HCC)    Peripheral arterial disease with history of revascularization (HCC)    Type 2 diabetes mellitus with polyneuropathy (HCC)    Right foot pain        Plan:     Discussed in detail the etiology of ulceration.  Discussed the importance of good hygiene and following conservative care instructions.  Discussed the potential for infection and other risks,  including osteomyelitis, if non-compliant. Patient verbalized understanding.  Discussed the potential for loss of limb/life.  Pt instructed on signs and symptoms of infection to watch for including N/F/V/C/SOB/CP, redness, increased drainage, malodor, etc. If any concern patient is to contact our office immediately or go to the Emergency Department. Pt acknowledge understanding.     Procedure Note: Debridement NOT PERFORMED TODAY  11042-Debridement, subcutaneous tissue (includes epidermis and dermis, if performed); first 20 square cm or less. (See overall size of wound to calculate debridement size)  Sharp excisional debridement of wound was performed to the level of and including subcutaneous tissue with #15 blade, dermal curette, or moistened gauze as listed below. Pt tolerated debridement well. Granular tissue/wound base was achieved with healthy bleeding noted. Bleeding was controlled with manual pressure and dry, sterile gauze. Non-viable tissue removed from wound bed with debridement.    Instrumentation Used:  dermal currette  Type of tissue removed: fibrotic, non-viable  Deepest Tissue Excised: Sub-cutaneous  Wound A: Yes  Wound B:  No  Wound C:  No   Was the removal of viable tissue evidenced by active bleeding?  Yes   Percentage of wound requiring debridement (if entire wound is larger than 20 sq cm)  Wound A: 100 % Wound B: _ % Wound C: _ %     Measurements of each wound after debridement (if wound is enlarged): Same as measurements above.    2nd-9th Visit debridement   Status of wound since last visit:   Patient compliance with support relief/off-loading:        Expected duration of skilled wound care therapy: 1-3 months  Goal of therapy: complete healing  Good prognosis  Expected frequency of subsequent procedures: 1-2 weeks      Immediate post procedure topical wound care and follow-up instructions including offloading if applicable: hydrofera blue and bandaid, pt to change daily as above    Fitted and  dispensed right post op shoe, pt to wear when ambulating      Prescribed DM shoes with inserts    Pt does see Dr. Greenfield and did have Right LE angioplasty with stenting on 2/7/24.      Will check epifix coverage    RTC 1 week for possible epifix grafting.  If worsening will get new xrays and will discuss with Dr. Greenfield regarding BF.       No follow-ups on file.    Nelson George, JENN  3/6/2024

## 2024-03-13 ENCOUNTER — OFFICE VISIT (OUTPATIENT)
Dept: PODIATRY CLINIC | Facility: CLINIC | Age: 74
End: 2024-03-13

## 2024-03-13 DIAGNOSIS — M79.671 RIGHT FOOT PAIN: ICD-10-CM

## 2024-03-13 DIAGNOSIS — L97.512 DIABETIC ULCER OF OTHER PART OF RIGHT FOOT ASSOCIATED WITH TYPE 2 DIABETES MELLITUS, WITH FAT LAYER EXPOSED (HCC): Primary | ICD-10-CM

## 2024-03-13 DIAGNOSIS — E11.621 DIABETIC ULCER OF OTHER PART OF RIGHT FOOT ASSOCIATED WITH TYPE 2 DIABETES MELLITUS, WITH FAT LAYER EXPOSED (HCC): Primary | ICD-10-CM

## 2024-03-13 DIAGNOSIS — E11.42 TYPE 2 DIABETES MELLITUS WITH POLYNEUROPATHY (HCC): ICD-10-CM

## 2024-03-13 DIAGNOSIS — I73.9 PERIPHERAL ARTERIAL DISEASE WITH HISTORY OF REVASCULARIZATION (HCC): ICD-10-CM

## 2024-03-13 DIAGNOSIS — Z98.890 PERIPHERAL ARTERIAL DISEASE WITH HISTORY OF REVASCULARIZATION (HCC): ICD-10-CM

## 2024-03-13 PROCEDURE — 15275 SKIN SUB GRAFT FACE/NK/HF/G: CPT | Performed by: PODIATRIST

## 2024-03-13 NOTE — PROGRESS NOTES
[facility]  Progress Note    Geovanny Prabhakar is a 73 year old male.   Chief Complaint   Patient presents with    Diabetic Ulcer     R foot f/u- here for possible epifix grafting- FBS this am was not taken- rates pain 4-8/10 most of the time         HPI:     This is a pleasant type 2 diabetic male with paroxysmal Afib, CAD.  He is a former smoker but vapes currently.  He does PAD with revascularization surgery.  He is on plavix and lyrica.      He does have neuropathy to his feet.      He presents to clinic today due to right foot wound f/u.  He is changing the dressings daily.  He states it is painful.        Allergies: Patient has no known allergies.   Current Outpatient Medications   Medication Sig Dispense Refill    aspirin 81 MG Oral Tab EC Take 1 tablet (81 mg total) by mouth daily. 90 tablet 3    clopidogrel 75 MG Oral Tab Take 1 tablet (75 mg total) by mouth daily. 42 tablet 0    metoprolol succinate  MG Oral Tablet 24 Hr Take 1 tablet (100 mg total) by mouth 2 (two) times daily. 180 tablet 1    pregabalin (LYRICA) 50 MG Oral Cap Take 1 capsule (50 mg total) by mouth 2 (two) times daily. (Patient not taking: Reported on 1/30/2024) 60 capsule 3    ALPRAZolam 0.5 MG Oral Tab Take 1 tablet (0.5 mg total) by mouth 2 (two) times daily as needed for Anxiety or Sleep. 60 tablet 5    SPIRONOLACTONE 25 MG Oral Tab TAKE 1 TABLET(25 MG) BY MOUTH DAILY 90 tablet 3    GLIPIZIDE ER 10 MG Oral Tablet 24 Hr TAKE 1 TABLET(10 MG) BY MOUTH DAILY 90 tablet 3    LISINOPRIL 20 MG Oral Tab TAKE 1 TABLET(20 MG) BY MOUTH TWICE DAILY 180 tablet 3    HYDROcodone-acetaminophen (NORCO) 5-325 MG Oral Tab Take 1 tablet by mouth every 6 (six) hours as needed for Pain. (Patient not taking: Reported on 3/6/2024) 30 tablet 0    Glucose Blood (ONETOUCH ULTRA BLUE) In Vitro Strip Check BS once daily1 100 strip 3    ONETOUCH DELICA LANCETS 33G Does not apply Misc Test BS once daily 100 each 3      Past Medical History:    Diagnosis Date    Atherosclerosis of coronary artery     CAD (coronary artery disease)     Cancer (HCC)     Colon cancer (HCC)     COVID-19 virus infection     Oct 2020    Diabetes (HCC)     DM2 (diabetes mellitus, type 2) (HCC)     Essential hypertension     HTN (hypertension)     Hyperlipidemia     Other abnormal glucose     rubber band hemorroid    Paroxysmal atrial fibrillation (HCC)       Past Surgical History:   Procedure Laterality Date    OTHER      MAZE    OTHER      CABAG    OTHER      appendectomy    OTHER      pacer    OTHER       Status post hemicolectomy, July 2019      No family history on file.   Social History     Socioeconomic History    Marital status:    Tobacco Use    Smoking status: Former    Smokeless tobacco: Never   Vaping Use    Vaping Use: Every day    Substances: Nicotine    Devices: Refillable tank   Substance and Sexual Activity    Alcohol use: No     Alcohol/week: 0.0 standard drinks of alcohol    Drug use: No           REVIEW OF SYSTEMS:   Denies nausea, fever, chills  No calf pain  No other muscle or joint aches  Denies chest pain or shortness of breath.      EXAM:   There were no vitals taken for this visit.    Constitutional:   Patient in no apparent distress. Well kept. Of normal body habitus. Alert and oriented to person, place, and time. No nutritional deficiencies noted. Adequate functional status.     Vascular Examination:  DP pulse is NP  PT pulse is NP  Capillary refill is adequate  Edema is present b/l LE     Integumentary Examination:  The patient's nails appear incurvated, thickened, elongated, dystrophic, discolored with subungual debris 1-5  right, 1-5  left nails.  Skin is of diminished texture and decreased  turgor.    Ulceration:     Etiology of ulceration:  Neuropathic        Location & Measurements of each wound L x W x D in cm (before debridement if performed)     Wound A location: Right lateral 5th met head Length: 0.5cm x Width: 0.3cm x Depth: 0.3  cm       Description of the wound: fibrotic  Description of exudate: none  no evidence of infection   no evidence of undermining   no evidence of tunneling   yes evidence of reduced circulation  If evidence of infection is present document treatment/response:      Required for Non-Pressure Ulcers-Depth of Ulcer (each wound)  Limited to breakdown of skin:   Subcutaneous tissue exposed: yes  Muscle/Tendon Exposed without necrosis:  with necrosis:   Bone exposed without necrosis:  with necrosis:      Neurological Examination:  Monofilament (10-g) sensation is intact  Sharp/dull is intact  Parasthesias present     Musculoskeletal Examination:  Muscle Strength is 5/5    POP to right foot ulcer      LABS & IMAGING:     Lab Results   Component Value Date     (H) 02/07/2024    BUN 26 (H) 02/07/2024    CREATSERUM 1.65 (H) 02/07/2024    BUNCREA 15.8 02/07/2024    ANIONGAP 6 02/07/2024    GFRAA 56 (L) 05/11/2022    GFRNAA 49 (L) 05/11/2022    CA 9.6 02/07/2024     02/07/2024    K 4.9 02/07/2024     02/07/2024    CO2 23.0 02/07/2024    OSMOCALC 299 (H) 02/07/2024        Lab Results   Component Value Date     (H) 04/19/2023    A1C 6.8 (A) 10/18/2023        No results found.     ASSESSMENT AND PLAN:   Diagnoses and all orders for this visit:    Diabetic ulcer of other part of right foot associated with type 2 diabetes mellitus, with fat layer exposed (HCC)    Peripheral arterial disease with history of revascularization (HCC)    Type 2 diabetes mellitus with polyneuropathy (HCC)    Right foot pain          Plan:     Discussed in detail the etiology of ulceration.  Discussed the importance of good hygiene and following conservative care instructions.  Discussed the potential for infection and other risks, including osteomyelitis, if non-compliant. Patient verbalized understanding.  Discussed the potential for loss of limb/life.  Pt instructed on signs and symptoms of infection to watch for including  N/F/V/C/SOB/CP, redness, increased drainage, malodor, etc. If any concern patient is to contact our office immediately or go to the Emergency Department. Pt acknowledge understanding.     Procedure Note: Debridement   11042-Debridement, subcutaneous tissue (includes epidermis and dermis, if performed); first 20 square cm or less. (See overall size of wound to calculate debridement size)  Sharp excisional debridement of wound was performed to the level of and including subcutaneous tissue with #15 blade, dermal curette, or moistened gauze as listed below. Pt tolerated debridement well. Granular tissue/wound base was achieved with healthy bleeding noted. Bleeding was controlled with manual pressure and dry, sterile gauze. Non-viable tissue removed from wound bed with debridement.    Instrumentation Used:  dermal currette  Type of tissue removed: fibrotic, non-viable  Deepest Tissue Excised: Sub-cutaneous  Wound A: Yes  Wound B:  No  Wound C:  No   Was the removal of viable tissue evidenced by active bleeding?  Yes   Percentage of wound requiring debridement (if entire wound is larger than 20 sq cm)  Wound A: 100 % Wound B: _ % Wound C: _ %     Measurements of each wound after debridement (if wound is enlarged): Same as measurements above.      Procedure: (CPT 11871) ([facility]  per sq cm)  After debridement of wound was performed, a sterile scrub, prep, and drape of the extremity was performed. Following the standard steps to a Epifix graft technique and utilizing a 5 cm graft, a Epifix graft was applied to the ulcerative site. The graft was then covered with a non-adhesive dressing secured with steri strips. Antibacterial topical, sterile 4x4's, jey, and coban/ace wrap were applied to the extremity. Patient was instructed to keep dressings clean, dry, and intact. The decision to use Epifix graft was based on failure of conservative pre-treatment wound management to induce healing of the wound. 100% of the graft  was utilized and applied to the wound surface with none wasted.  Epifix  ID [id number]  Exp: 9/1/28    2nd-9th Visit debridement   Status of wound since last visit: no improvement  Patient compliance with support relief/off-loading: yes       Expected duration of skilled wound care therapy: 1-3 months  Goal of therapy: complete healing  Good prognosis  Expected frequency of subsequent procedures: 1-2 weeks      Immediate post procedure topical wound care and follow-up instructions including offloading if applicable: epifix, hydrofera blue and DSD, pt to keep CDI x 1 week    Cont wearing right post op shoe, pt to wear when ambulating, he is not wearing today    Prescribed DM shoes with inserts, he has appt in June    Pt does see Dr. Greenfield and did have Right LE angioplasty with stenting on 2/7/24.          RTC 1 week for possible 2nd epifix grafting.  If worsening will get new xrays and will discuss with Dr. Greenfield regarding BF.       No follow-ups on file.    Nelson George DPM  3/13/24

## 2024-03-22 ENCOUNTER — OFFICE VISIT (OUTPATIENT)
Dept: PODIATRY CLINIC | Facility: CLINIC | Age: 74
End: 2024-03-22

## 2024-03-22 DIAGNOSIS — M79.671 RIGHT FOOT PAIN: ICD-10-CM

## 2024-03-22 DIAGNOSIS — Z98.890 PERIPHERAL ARTERIAL DISEASE WITH HISTORY OF REVASCULARIZATION (HCC): ICD-10-CM

## 2024-03-22 DIAGNOSIS — L97.512 DIABETIC ULCER OF OTHER PART OF RIGHT FOOT ASSOCIATED WITH TYPE 2 DIABETES MELLITUS, WITH FAT LAYER EXPOSED (HCC): Primary | ICD-10-CM

## 2024-03-22 DIAGNOSIS — E11.42 TYPE 2 DIABETES MELLITUS WITH POLYNEUROPATHY (HCC): ICD-10-CM

## 2024-03-22 DIAGNOSIS — I73.9 PERIPHERAL ARTERIAL DISEASE WITH HISTORY OF REVASCULARIZATION (HCC): ICD-10-CM

## 2024-03-22 DIAGNOSIS — E11.621 DIABETIC ULCER OF OTHER PART OF RIGHT FOOT ASSOCIATED WITH TYPE 2 DIABETES MELLITUS, WITH FAT LAYER EXPOSED (HCC): Primary | ICD-10-CM

## 2024-03-22 PROCEDURE — 15275 SKIN SUB GRAFT FACE/NK/HF/G: CPT | Performed by: PODIATRIST

## 2024-03-22 NOTE — PROGRESS NOTES
[facility]iatry  Progress Note    Geovanny Prabhakar is a 73 year old male.   Chief Complaint   Patient presents with    Diabetic Ulcer     Right foot f/u - possible Epifix #2 - he states the dressing came off yesterday - has pain rated as 4-5/10 most of the time - has a little drainage - this AM he did not checked his BS         HPI:     This is a pleasant type 2 diabetic male with paroxysmal Afib, CAD.  He is a former smoker but vapes currently.  He does PAD with revascularization surgery.  He is on plavix and lyrica.      He does have neuropathy to his feet.      He presents to clinic today due to right foot wound f/u.  He has kept dressings CDI.  He states it is painful.        Allergies: Patient has no known allergies.   Current Outpatient Medications   Medication Sig Dispense Refill    aspirin 81 MG Oral Tab EC Take 1 tablet (81 mg total) by mouth daily. 90 tablet 3    metoprolol succinate  MG Oral Tablet 24 Hr Take 1 tablet (100 mg total) by mouth 2 (two) times daily. 180 tablet 1    pregabalin (LYRICA) 50 MG Oral Cap Take 1 capsule (50 mg total) by mouth 2 (two) times daily. (Patient not taking: Reported on 1/30/2024) 60 capsule 3    ALPRAZolam 0.5 MG Oral Tab Take 1 tablet (0.5 mg total) by mouth 2 (two) times daily as needed for Anxiety or Sleep. 60 tablet 5    SPIRONOLACTONE 25 MG Oral Tab TAKE 1 TABLET(25 MG) BY MOUTH DAILY 90 tablet 3    GLIPIZIDE ER 10 MG Oral Tablet 24 Hr TAKE 1 TABLET(10 MG) BY MOUTH DAILY 90 tablet 3    LISINOPRIL 20 MG Oral Tab TAKE 1 TABLET(20 MG) BY MOUTH TWICE DAILY 180 tablet 3    HYDROcodone-acetaminophen (NORCO) 5-325 MG Oral Tab Take 1 tablet by mouth every 6 (six) hours as needed for Pain. (Patient not taking: Reported on 3/6/2024) 30 tablet 0    Glucose Blood (ONETOUCH ULTRA BLUE) In Vitro Strip Check BS once daily1 100 strip 3    ONETOUCH DELICA LANCETS 33G Does not apply Misc Test BS once daily 100 each 3      Past Medical History:   Diagnosis Date     Atherosclerosis of coronary artery     CAD (coronary artery disease)     Cancer (HCC)     Colon cancer (HCC)     COVID-19 virus infection     Oct 2020    Diabetes (HCC)     DM2 (diabetes mellitus, type 2) (HCC)     Essential hypertension     HTN (hypertension)     Hyperlipidemia     Other abnormal glucose     rubber band hemorroid    Paroxysmal atrial fibrillation (HCC)       Past Surgical History:   Procedure Laterality Date    OTHER      MAZE    OTHER      CABAG    OTHER      appendectomy    OTHER      pacer    OTHER       Status post hemicolectomy, July 2019      History reviewed. No pertinent family history.   Social History     Socioeconomic History    Marital status:    Tobacco Use    Smoking status: Former    Smokeless tobacco: Never   Vaping Use    Vaping Use: Every day    Substances: Nicotine    Devices: Refillable tank   Substance and Sexual Activity    Alcohol use: No     Alcohol/week: 0.0 standard drinks of alcohol    Drug use: No           REVIEW OF SYSTEMS:   Denies nausea, fever, chills  No calf pain  No other muscle or joint aches  Denies chest pain or shortness of breath.      EXAM:   There were no vitals taken for this visit.    Constitutional:   Patient in no apparent distress. Well kept. Of normal body habitus. Alert and oriented to person, place, and time. No nutritional deficiencies noted. Adequate functional status.     Vascular Examination:  DP pulse is NP  PT pulse is NP  Capillary refill is adequate  Edema is present b/l LE     Integumentary Examination:  The patient's nails appear incurvated, thickened, elongated, dystrophic, discolored with subungual debris 1-5  right, 1-5  left nails.  Skin is of diminished texture and decreased  turgor.    Ulceration:     Etiology of ulceration:  Neuropathic        Location & Measurements of each wound L x W x D in cm (before debridement if performed)     Wound A location: Right lateral 5th met head Length: 0.5cm x Width: 0.3cm x Depth: 0.2  cm       Description of the wound: fibrotic  Description of exudate: none  no evidence of infection   no evidence of undermining   no evidence of tunneling   yes evidence of reduced circulation  If evidence of infection is present document treatment/response:      Required for Non-Pressure Ulcers-Depth of Ulcer (each wound)  Limited to breakdown of skin:   Subcutaneous tissue exposed: yes  Muscle/Tendon Exposed without necrosis:  with necrosis:   Bone exposed without necrosis:  with necrosis:      Neurological Examination:  Monofilament (10-g) sensation is intact  Sharp/dull is intact  Parasthesias present     Musculoskeletal Examination:  Muscle Strength is 5/5    POP to right foot ulcer      LABS & IMAGING:     Lab Results   Component Value Date     (H) 02/07/2024    BUN 26 (H) 02/07/2024    CREATSERUM 1.65 (H) 02/07/2024    BUNCREA 15.8 02/07/2024    ANIONGAP 6 02/07/2024    GFRAA 56 (L) 05/11/2022    GFRNAA 49 (L) 05/11/2022    CA 9.6 02/07/2024     02/07/2024    K 4.9 02/07/2024     02/07/2024    CO2 23.0 02/07/2024    OSMOCALC 299 (H) 02/07/2024        Lab Results   Component Value Date     (H) 04/19/2023    A1C 6.8 (A) 10/18/2023        No results found.     ASSESSMENT AND PLAN:   Diagnoses and all orders for this visit:    Diabetic ulcer of other part of right foot associated with type 2 diabetes mellitus, with fat layer exposed (HCC)    Peripheral arterial disease with history of revascularization (HCC)    Type 2 diabetes mellitus with polyneuropathy (HCC)    Right foot pain            Plan:     Discussed in detail the etiology of ulceration.  Discussed the importance of good hygiene and following conservative care instructions.  Discussed the potential for infection and other risks, including osteomyelitis, if non-compliant. Patient verbalized understanding.  Discussed the potential for loss of limb/life.  Pt instructed on signs and symptoms of infection to watch for including  N/F/V/C/SOB/CP, redness, increased drainage, malodor, etc. If any concern patient is to contact our office immediately or go to the Emergency Department. Pt acknowledge understanding.     Procedure Note: Debridement   11042-Debridement, subcutaneous tissue (includes epidermis and dermis, if performed); first 20 square cm or less. (See overall size of wound to calculate debridement size)  Sharp excisional debridement of wound was performed to the level of and including subcutaneous tissue with #15 blade, dermal curette, or moistened gauze as listed below. Pt tolerated debridement well. Granular tissue/wound base was achieved with healthy bleeding noted. Bleeding was controlled with manual pressure and dry, sterile gauze. Non-viable tissue removed from wound bed with debridement.    Instrumentation Used:  dermal currette  Type of tissue removed: fibrotic, non-viable  Deepest Tissue Excised: Sub-cutaneous  Wound A: Yes  Wound B:  No  Wound C:  No   Was the removal of viable tissue evidenced by active bleeding?  Yes   Percentage of wound requiring debridement (if entire wound is larger than 20 sq cm)  Wound A: 100 % Wound B: _ % Wound C: _ %     Measurements of each wound after debridement (if wound is enlarged): Same as measurements above.      Procedure: (CPT 70261) ([facility] [place]  per sq cm)  After debridement of wound was performed, a sterile scrub, prep, and drape of the extremity was performed. Following the standard steps to a Epifix graft technique and utilizing a 5 cm graft, a Epifix graft was applied to the ulcerative site. The graft was then covered with a non-adhesive dressing secured with steri strips. Antibacterial topical, sterile 4x4's, jey, and coban/ace wrap were applied to the extremity. Patient was instructed to keep dressings clean, dry, and intact. The decision to use Epifix graft was based on failure of conservative pre-treatment wound management to induce healing of the wound. 100% of the graft  was utilized and applied to the wound surface with none wasted.  Epifix  ID [id number]  Exp: 7/1/28    2nd-9th Visit debridement   Status of wound since last visit: mild improvement  Patient compliance with support relief/off-loading: yes       Expected duration of skilled wound care therapy: 1-3 months  Goal of therapy: complete healing  Good prognosis  Expected frequency of subsequent procedures: 1-2 weeks      Immediate post procedure topical wound care and follow-up instructions including offloading if applicable: epifix, hydrofera blue and DSD, pt to keep CDI x 1 week    Cont wearing right post op shoe, pt to wear when ambulating, he is not wearing today    Prescribed DM shoes with inserts, he has appt in June    Pt does see Dr. Greenfield and did have Right LE angioplasty with stenting on 2/7/24.          RTC 1 week for possible 3rd epifix grafting.  If worsening will get new xrays and will discuss with Dr. Greenfield regarding BF.       No follow-ups on file.    Nelson George DPM  3/22/24

## 2024-03-25 RX ORDER — CLOPIDOGREL BISULFATE 75 MG/1
75 TABLET ORAL DAILY
Qty: 90 TABLET | Refills: 0 | OUTPATIENT
Start: 2024-03-25

## 2024-03-29 ENCOUNTER — HOSPITAL ENCOUNTER (OUTPATIENT)
Dept: GENERAL RADIOLOGY | Facility: HOSPITAL | Age: 74
Discharge: HOME | End: 2024-03-29
Attending: PODIATRIST
Payer: MEDICARE

## 2024-03-29 ENCOUNTER — OFFICE VISIT (OUTPATIENT)
Dept: PODIATRY CLINIC | Facility: CLINIC | Age: 74
End: 2024-03-29

## 2024-03-29 DIAGNOSIS — E11.621 DIABETIC ULCER OF OTHER PART OF RIGHT FOOT ASSOCIATED WITH TYPE 2 DIABETES MELLITUS, WITH FAT LAYER EXPOSED (HCC): ICD-10-CM

## 2024-03-29 DIAGNOSIS — L97.512 DIABETIC ULCER OF OTHER PART OF RIGHT FOOT ASSOCIATED WITH TYPE 2 DIABETES MELLITUS, WITH FAT LAYER EXPOSED (HCC): ICD-10-CM

## 2024-03-29 DIAGNOSIS — E11.621 DIABETIC ULCER OF OTHER PART OF RIGHT FOOT ASSOCIATED WITH TYPE 2 DIABETES MELLITUS, WITH FAT LAYER EXPOSED (HCC): Primary | ICD-10-CM

## 2024-03-29 DIAGNOSIS — I73.9 PERIPHERAL ARTERIAL DISEASE WITH HISTORY OF REVASCULARIZATION (HCC): ICD-10-CM

## 2024-03-29 DIAGNOSIS — M79.671 RIGHT FOOT PAIN: ICD-10-CM

## 2024-03-29 DIAGNOSIS — Z98.890 PERIPHERAL ARTERIAL DISEASE WITH HISTORY OF REVASCULARIZATION (HCC): ICD-10-CM

## 2024-03-29 DIAGNOSIS — L97.512 DIABETIC ULCER OF OTHER PART OF RIGHT FOOT ASSOCIATED WITH TYPE 2 DIABETES MELLITUS, WITH FAT LAYER EXPOSED (HCC): Primary | ICD-10-CM

## 2024-03-29 DIAGNOSIS — E11.42 TYPE 2 DIABETES MELLITUS WITH POLYNEUROPATHY (HCC): ICD-10-CM

## 2024-03-29 PROCEDURE — 73630 X-RAY EXAM OF FOOT: CPT | Performed by: PODIATRIST

## 2024-03-29 PROCEDURE — 99214 OFFICE O/P EST MOD 30 MIN: CPT | Performed by: PODIATRIST

## 2024-03-29 NOTE — PROGRESS NOTES
[facility] Podiatry  Progress Note    Geovanny Prabhakar is a 73 year old male.   Chief Complaint   Patient presents with    Diabetic Ulcer     Follow up for diabetic ulcer on right foot, States there is pain today but minor rating between 3-4 . No Blood sugar check today .          HPI:     This is a pleasant type 2 diabetic male with paroxysmal Afib, CAD.  He is a former smoker but vapes currently.  He does PAD with revascularization surgery.  He is on plavix and lyrica.      He does have neuropathy to his feet.      He presents to clinic today due to right foot wound f/u.  He has kept dressings CDI.  He states it is painful.        Allergies: Patient has no known allergies.   Current Outpatient Medications   Medication Sig Dispense Refill    aspirin 81 MG Oral Tab EC Take 1 tablet (81 mg total) by mouth daily. 90 tablet 3    metoprolol succinate  MG Oral Tablet 24 Hr Take 1 tablet (100 mg total) by mouth 2 (two) times daily. 180 tablet 1    ALPRAZolam 0.5 MG Oral Tab Take 1 tablet (0.5 mg total) by mouth 2 (two) times daily as needed for Anxiety or Sleep. 60 tablet 5    SPIRONOLACTONE 25 MG Oral Tab TAKE 1 TABLET(25 MG) BY MOUTH DAILY 90 tablet 3    GLIPIZIDE ER 10 MG Oral Tablet 24 Hr TAKE 1 TABLET(10 MG) BY MOUTH DAILY 90 tablet 3    LISINOPRIL 20 MG Oral Tab TAKE 1 TABLET(20 MG) BY MOUTH TWICE DAILY 180 tablet 3    Glucose Blood (ONETOUCH ULTRA BLUE) In Vitro Strip Check BS once daily1 100 strip 3    ONETOUCH DELICA LANCETS 33G Does not apply Misc Test BS once daily 100 each 3    pregabalin (LYRICA) 50 MG Oral Cap Take 1 capsule (50 mg total) by mouth 2 (two) times daily. (Patient not taking: Reported on 1/30/2024) 60 capsule 3    HYDROcodone-acetaminophen (NORCO) 5-325 MG Oral Tab Take 1 tablet by mouth every 6 (six) hours as needed for Pain. (Patient not taking: Reported on 3/6/2024) 30 tablet 0      Past Medical History:   Diagnosis Date    Atherosclerosis of coronary artery     CAD (coronary artery  disease)     Cancer (HCC)     Colon cancer (HCC)     COVID-19 virus infection     Oct 2020    Diabetes (HCC)     DM2 (diabetes mellitus, type 2) (HCC)     Essential hypertension     HTN (hypertension)     Hyperlipidemia     Other abnormal glucose     rubber band hemorroid    Paroxysmal atrial fibrillation (HCC)       Past Surgical History:   Procedure Laterality Date    OTHER      MAZE    OTHER      CABAG    OTHER      appendectomy    OTHER      pacer    OTHER       Status post hemicolectomy, July 2019      No family history on file.   Social History     Socioeconomic History    Marital status:    Tobacco Use    Smoking status: Former    Smokeless tobacco: Never   Vaping Use    Vaping Use: Every day    Substances: Nicotine    Devices: Refillable tank   Substance and Sexual Activity    Alcohol use: No     Alcohol/week: 0.0 standard drinks of alcohol    Drug use: No           REVIEW OF SYSTEMS:   Denies nausea, fever, chills  No calf pain  No other muscle or joint aches  Denies chest pain or shortness of breath.      EXAM:   There were no vitals taken for this visit.    Constitutional:   Patient in no apparent distress. Well kept. Of normal body habitus. Alert and oriented to person, place, and time. No nutritional deficiencies noted. Adequate functional status.     Vascular Examination:  DP pulse is NP  PT pulse is NP  Capillary refill is adequate  Edema is present b/l LE     Integumentary Examination:  The patient's nails appear incurvated, thickened, elongated, dystrophic, discolored with subungual debris 1-5  right, 1-5  left nails.  Skin is of diminished texture and decreased  turgor.    Ulceration:     Etiology of ulceration:  Neuropathic        Location & Measurements of each wound L x W x D in cm (before debridement if performed)     Wound A location: Right lateral 5th met head Length: 0.7cm x Width: 0.4cm x Depth: 0.3 cm       Description of the wound: fibrotic  Description of exudate: none  no  evidence of infection   no evidence of undermining   no evidence of tunneling   yes evidence of reduced circulation  If evidence of infection is present document treatment/response:      Required for Non-Pressure Ulcers-Depth of Ulcer (each wound)  Limited to breakdown of skin:   Subcutaneous tissue exposed: yes  Muscle/Tendon Exposed without necrosis:  with necrosis:   Bone exposed without necrosis:  with necrosis:      Neurological Examination:  Monofilament (10-g) sensation is intact  Sharp/dull is intact  Parasthesias present     Musculoskeletal Examination:  Muscle Strength is 5/5    POP to right foot ulcer      LABS & IMAGING:     Lab Results   Component Value Date     (H) 02/07/2024    BUN 26 (H) 02/07/2024    CREATSERUM 1.65 (H) 02/07/2024    BUNCREA 15.8 02/07/2024    ANIONGAP 6 02/07/2024    GFRAA 56 (L) 05/11/2022    GFRNAA 49 (L) 05/11/2022    CA 9.6 02/07/2024     02/07/2024    K 4.9 02/07/2024     02/07/2024    CO2 23.0 02/07/2024    OSMOCALC 299 (H) 02/07/2024        Lab Results   Component Value Date     (H) 04/19/2023    A1C 6.8 (A) 10/18/2023        No results found.     ASSESSMENT AND PLAN:   Diagnoses and all orders for this visit:    Diabetic ulcer of other part of right foot associated with type 2 diabetes mellitus, with fat layer exposed (HCC)    Peripheral arterial disease with history of revascularization (HCC)    Type 2 diabetes mellitus with polyneuropathy (HCC)    Right foot pain              Plan:     Discussed in detail the etiology of ulceration.  Discussed the importance of good hygiene and following conservative care instructions.  Discussed the potential for infection and other risks, including osteomyelitis, if non-compliant. Patient verbalized understanding.  Discussed the potential for loss of limb/life.  Pt instructed on signs and symptoms of infection to watch for including N/F/V/C/SOB/CP, redness, increased drainage, malodor, etc. If any concern  patient is to contact our office immediately or go to the Emergency Department. Pt acknowledge understanding.     Procedure Note: Debridement   11042-Debridement, subcutaneous tissue (includes epidermis and dermis, if performed); first 20 square cm or less. (See overall size of wound to calculate debridement size)  Sharp excisional debridement of wound was performed to the level of and including subcutaneous tissue with #15 blade, dermal curette, or moistened gauze as listed below. Pt tolerated debridement well. Granular tissue/wound base was achieved with healthy bleeding noted. Bleeding was controlled with manual pressure and dry, sterile gauze. Non-viable tissue removed from wound bed with debridement.    Instrumentation Used:  dermal currette  Type of tissue removed: fibrotic, non-viable  Deepest Tissue Excised: Sub-cutaneous  Wound A: Yes  Wound B:  No  Wound C:  No   Was the removal of viable tissue evidenced by active bleeding?  Yes   Percentage of wound requiring debridement (if entire wound is larger than 20 sq cm)  Wound A: 100 % Wound B: _ % Wound C: _ %     Measurements of each wound after debridement (if wound is enlarged): Same as measurements above.      2nd-9th Visit debridement   Status of wound since last visit: no improvement  Patient compliance with support relief/off-loading: yes       Expected duration of skilled wound care therapy: 1-3 months  Goal of therapy: complete healing  Good prognosis  Expected frequency of subsequent procedures: 1-2 weeks      Immediate post procedure topical wound care and follow-up instructions including offloading if applicable: puracol ag and bandaid, pt to change daily as above and alternate with hydrofera blue    Cont wearing right post op shoe, pt to wear when ambulating, he is not wearing today    Prescribed DM shoes with inserts, he has appt May 1st     Pt does see Dr. Greenfield and did have Right LE angioplasty with stenting on 2/7/24.      Ordered right foot  full WB xrays    Will hold off on 3rd epifix due to no improvement    RTC 1-2 weeks for right foot wound care and xray review.  If worsening will discuss with Dr. Greenfield regarding BF.       No follow-ups on file.    Nelson George DPM  3/29/24

## 2024-03-29 NOTE — TELEPHONE ENCOUNTER
Care Transitions Outreach Attempt-1st attempt    Call within 2 business days of discharge: Yes   Attempted to reach patient for subsequent transitional call.  Left HIPPA compliant VM to return call directly to [phone].     Patient: Joel Bermudez Patient : 1955 MRN: [id number]    Last Discharge Facility       Date Complaint Diagnosis Description Type Department Provider    3/21/24 Chest Pain ACS (acute coronary syndrome) (HCC) ... ED to Hosp-Admission (Discharged) (ADMITTED) Chris Cruz MD; Luciano Blanco MD              Noted following upcoming appointments from discharge chart review:   [facility] follow up appointment(s):   Future Appointments   Date Time Provider Department Center   2024  1:15 PM Ivy Patel APRN - CNP [facility] [place]   2024 10:30 AM Jyoti Dye MD [facility] [place]   3/17/2025 10:15 AM Thelma Monteiro MD [facility] [place]     Non-[facility]  follow up appointment(s): na       To Pierre Franco to advise on high drug interaction between pending med and lisinopril. Also to FD as patient is overdue for PE and will most likely need repeat labs as well. Thanks!

## 2024-04-01 RX ORDER — CLOPIDOGREL BISULFATE 75 MG/1
75 TABLET ORAL DAILY
Qty: 90 TABLET | Refills: 0 | Status: SHIPPED | OUTPATIENT
Start: 2024-04-01 | End: 2024-04-09

## 2024-04-04 ENCOUNTER — LAB ENCOUNTER (OUTPATIENT)
Dept: LAB | Age: 74
End: 2024-04-04
Attending: INTERNAL MEDICINE
Payer: MEDICARE

## 2024-04-04 DIAGNOSIS — G62.9 PERIPHERAL POLYNEUROPATHY: ICD-10-CM

## 2024-04-04 DIAGNOSIS — Z13.0 SCREENING FOR DEFICIENCY ANEMIA: ICD-10-CM

## 2024-04-04 DIAGNOSIS — Z13.29 SCREENING FOR THYROID DISORDER: ICD-10-CM

## 2024-04-04 DIAGNOSIS — E11.9 TYPE 2 DIABETES MELLITUS WITHOUT COMPLICATION, WITHOUT LONG-TERM CURRENT USE OF INSULIN (HCC): ICD-10-CM

## 2024-04-04 DIAGNOSIS — C18.9 MALIGNANT NEOPLASM OF COLON, UNSPECIFIED PART OF COLON (HCC): ICD-10-CM

## 2024-04-04 DIAGNOSIS — Z13.89 SCREENING FOR NEPHROPATHY: ICD-10-CM

## 2024-04-04 DIAGNOSIS — I73.9 PAD (PERIPHERAL ARTERY DISEASE) (HCC): ICD-10-CM

## 2024-04-04 DIAGNOSIS — E78.5 HYPERLIPIDEMIA, UNSPECIFIED HYPERLIPIDEMIA TYPE: ICD-10-CM

## 2024-04-04 DIAGNOSIS — E53.8 VITAMIN B12 DEFICIENCY: ICD-10-CM

## 2024-04-04 LAB
ALBUMIN SERPL-MCNC: 4.6 G/DL (ref 3.2–4.8)
ALBUMIN/GLOB SERPL: 1.7 {RATIO} (ref 1–2)
ALP LIVER SERPL-CCNC: 75 U/L
ALT SERPL-CCNC: 10 U/L
ANION GAP SERPL CALC-SCNC: 1 MMOL/L (ref 0–18)
AST SERPL-CCNC: 13 U/L (ref ?–34)
BASOPHILS # BLD AUTO: 0.1 X10(3) UL (ref 0–0.2)
BASOPHILS NFR BLD AUTO: 1.3 %
BILIRUB SERPL-MCNC: 0.7 MG/DL (ref 0.2–1.1)
BILIRUB UR QL: NEGATIVE
BUN BLD-MCNC: 28 MG/DL (ref 9–23)
BUN/CREAT SERPL: 18.3 (ref 10–20)
CALCIUM BLD-MCNC: 10 MG/DL (ref 8.7–10.4)
CEA SERPL-MCNC: <0.5 NG/ML (ref ?–5)
CHLORIDE SERPL-SCNC: 110 MMOL/L (ref 98–112)
CHOLEST SERPL-MCNC: 178 MG/DL (ref ?–200)
CLARITY UR: CLEAR
CO2 SERPL-SCNC: 26 MMOL/L (ref 21–32)
CREAT BLD-MCNC: 1.53 MG/DL
CREAT UR-SCNC: 65.4 MG/DL
DEPRECATED RDW RBC AUTO: 42.6 FL (ref 35.1–46.3)
EGFRCR SERPLBLD CKD-EPI 2021: 48 ML/MIN/1.73M2 (ref 60–?)
EOSINOPHIL # BLD AUTO: 0.34 X10(3) UL (ref 0–0.7)
EOSINOPHIL NFR BLD AUTO: 4.4 %
ERYTHROCYTE [DISTWIDTH] IN BLOOD BY AUTOMATED COUNT: 14 % (ref 11–15)
EST. AVERAGE GLUCOSE BLD GHB EST-MCNC: 177 MG/DL (ref 68–126)
FASTING PATIENT LIPID ANSWER: YES
FASTING STATUS PATIENT QL REPORTED: YES
GLOBULIN PLAS-MCNC: 2.7 G/DL (ref 2.8–4.4)
GLUCOSE BLD-MCNC: 144 MG/DL (ref 70–99)
GLUCOSE UR-MCNC: NORMAL MG/DL
HBA1C MFR BLD: 7.8 % (ref ?–5.7)
HCT VFR BLD AUTO: 40.2 %
HDLC SERPL-MCNC: 39 MG/DL (ref 40–59)
HGB BLD-MCNC: 14.1 G/DL
HGB UR QL STRIP.AUTO: NEGATIVE
IMM GRANULOCYTES # BLD AUTO: 0.03 X10(3) UL (ref 0–1)
IMM GRANULOCYTES NFR BLD: 0.4 %
KETONES UR-MCNC: NEGATIVE MG/DL
LDLC SERPL CALC-MCNC: 119 MG/DL (ref ?–100)
LEUKOCYTE ESTERASE UR QL STRIP.AUTO: 75
LYMPHOCYTES # BLD AUTO: 1.21 X10(3) UL (ref 1–4)
LYMPHOCYTES NFR BLD AUTO: 15.8 %
MCH RBC QN AUTO: 29.4 PG (ref 26–34)
MCHC RBC AUTO-ENTMCNC: 35.1 G/DL (ref 31–37)
MCV RBC AUTO: 83.9 FL
MICROALBUMIN UR-MCNC: <0.3 MG/DL
MONOCYTES # BLD AUTO: 0.62 X10(3) UL (ref 0.1–1)
MONOCYTES NFR BLD AUTO: 8.1 %
NEUTROPHILS # BLD AUTO: 5.36 X10 (3) UL (ref 1.5–7.7)
NEUTROPHILS # BLD AUTO: 5.36 X10(3) UL (ref 1.5–7.7)
NEUTROPHILS NFR BLD AUTO: 70 %
NITRITE UR QL STRIP.AUTO: NEGATIVE
NONHDLC SERPL-MCNC: 139 MG/DL (ref ?–130)
OSMOLALITY SERPL CALC.SUM OF ELEC: 292 MOSM/KG (ref 275–295)
PH UR: 5 [PH] (ref 5–8)
PLATELET # BLD AUTO: 155 10(3)UL (ref 150–450)
POTASSIUM SERPL-SCNC: 5.3 MMOL/L (ref 3.5–5.1)
PROT SERPL-MCNC: 7.3 G/DL (ref 5.7–8.2)
PROT UR-MCNC: NEGATIVE MG/DL
RBC # BLD AUTO: 4.79 X10(6)UL
SODIUM SERPL-SCNC: 137 MMOL/L (ref 136–145)
SP GR UR STRIP: 1.01 (ref 1–1.03)
TRIGL SERPL-MCNC: 110 MG/DL (ref 30–149)
TSI SER-ACNC: 1.77 MIU/ML (ref 0.55–4.78)
UROBILINOGEN UR STRIP-ACNC: NORMAL
VIT B12 SERPL-MCNC: 534 PG/ML (ref 211–911)
VLDLC SERPL CALC-MCNC: 19 MG/DL (ref 0–30)
WBC # BLD AUTO: 7.7 X10(3) UL (ref 4–11)

## 2024-04-04 PROCEDURE — 84443 ASSAY THYROID STIM HORMONE: CPT

## 2024-04-04 PROCEDURE — 83036 HEMOGLOBIN GLYCOSYLATED A1C: CPT

## 2024-04-04 PROCEDURE — 87086 URINE CULTURE/COLONY COUNT: CPT

## 2024-04-04 PROCEDURE — 81001 URINALYSIS AUTO W/SCOPE: CPT

## 2024-04-04 PROCEDURE — 82378 CARCINOEMBRYONIC ANTIGEN: CPT

## 2024-04-04 PROCEDURE — 80053 COMPREHEN METABOLIC PANEL: CPT

## 2024-04-04 PROCEDURE — 82043 UR ALBUMIN QUANTITATIVE: CPT

## 2024-04-04 PROCEDURE — 82607 VITAMIN B-12: CPT

## 2024-04-04 PROCEDURE — 36415 COLL VENOUS BLD VENIPUNCTURE: CPT

## 2024-04-04 PROCEDURE — 82570 ASSAY OF URINE CREATININE: CPT

## 2024-04-04 PROCEDURE — 85025 COMPLETE CBC W/AUTO DIFF WBC: CPT

## 2024-04-04 PROCEDURE — 80061 LIPID PANEL: CPT

## 2024-04-09 ENCOUNTER — OFFICE VISIT (OUTPATIENT)
Dept: INTERNAL MEDICINE CLINIC | Facility: CLINIC | Age: 74
End: 2024-04-09

## 2024-04-09 VITALS
HEART RATE: 58 BPM | HEIGHT: 66.5 IN | WEIGHT: 185.63 LBS | DIASTOLIC BLOOD PRESSURE: 70 MMHG | TEMPERATURE: 98 F | OXYGEN SATURATION: 100 % | BODY MASS INDEX: 29.48 KG/M2 | SYSTOLIC BLOOD PRESSURE: 144 MMHG

## 2024-04-09 DIAGNOSIS — E11.9 TYPE 2 DIABETES MELLITUS WITHOUT COMPLICATION, WITHOUT LONG-TERM CURRENT USE OF INSULIN (HCC): ICD-10-CM

## 2024-04-09 DIAGNOSIS — G62.9 PERIPHERAL POLYNEUROPATHY: ICD-10-CM

## 2024-04-09 DIAGNOSIS — I10 ESSENTIAL HYPERTENSION WITH GOAL BLOOD PRESSURE LESS THAN 140/90: ICD-10-CM

## 2024-04-09 DIAGNOSIS — E53.8 VITAMIN B12 DEFICIENCY: ICD-10-CM

## 2024-04-09 DIAGNOSIS — I10 PRIMARY HYPERTENSION: ICD-10-CM

## 2024-04-09 DIAGNOSIS — I48.0 PAROXYSMAL ATRIAL FIBRILLATION (HCC): ICD-10-CM

## 2024-04-09 DIAGNOSIS — I73.9 PAD (PERIPHERAL ARTERY DISEASE) (HCC): ICD-10-CM

## 2024-04-09 DIAGNOSIS — I25.10 CORONARY ARTERY DISEASE INVOLVING NATIVE CORONARY ARTERY OF NATIVE HEART WITHOUT ANGINA PECTORIS: ICD-10-CM

## 2024-04-09 DIAGNOSIS — Z00.00 ENCOUNTER FOR ANNUAL HEALTH EXAMINATION: ICD-10-CM

## 2024-04-09 DIAGNOSIS — Z00.00 ANNUAL PHYSICAL EXAM: Primary | ICD-10-CM

## 2024-04-09 DIAGNOSIS — E78.5 HYPERLIPIDEMIA, UNSPECIFIED HYPERLIPIDEMIA TYPE: ICD-10-CM

## 2024-04-09 DIAGNOSIS — M48.061 SPINAL STENOSIS OF LUMBAR REGION, UNSPECIFIED WHETHER NEUROGENIC CLAUDICATION PRESENT: ICD-10-CM

## 2024-04-09 PROCEDURE — G0439 PPPS, SUBSEQ VISIT: HCPCS | Performed by: INTERNAL MEDICINE

## 2024-04-09 NOTE — PATIENT INSTRUCTIONS
- You were seen in clinic for regular annual check-up.  We did review your most recent set of blood work which showed increasing your sugar levels    In addition glipizide, should incorporate additional medications to bring her sugar levels down.  - Continue with trying to cut down on carbohydrate intake   - continue with your home walking exercise regimen as this will help reduce down the sugars as well as promote blood flow throughout the legs  - We should try to resume glucose checks at home as this will determine when we run high    -Continue following up with podiatry for debridement of the poorly healing wound.  He may need to follow-up with Dr. Greenfield of interventional radiology for further management    This is overall improved since the intervention and podiatry evaluation  Keep up the good work with home dressing changes  You may need to resume your furosemide 40 mg daily as needed to reduce down leg swelling and reduce any pressure to the area      -Please continue checking your blood pressures at home and notify us if they are increasing   - please continue checking your blood sugars at home and notify us if they are increasing  -Please follow-up as you may be due for repeat colonoscopy.  Notify us if you are interested in this in the future  - Vaccines may be due for: COVID dose #4, Prevnar 20/pneumonia shot, We recommended checking with your insurance for coverage of Shingrix, a 2-dose shingles vaccine that can be obtained at the pharmacy if there is adequate coverage through your insurance plan.  - Please continue to eat a varied diet including recommended servings of vegetables, fruits, and low fat dairy. Minimize high saturated fats (such as fast foods) and high sugar intake (such as soda)  - We recommend 150 minutes of moderate intensity exercise (brisk walking, swimming) weekly to maintain your current weight.  Targeted weight loss will require more vigorous exercise or more than 150  minutes/week.    Return to clinic in 4 months for diabetic follow-up  Geovanny Prabhakar's SCREENING SCHEDULE   Tests on this list are recommended by your physician but may not be covered, or covered at this frequency, by your insurer.   Please check with your insurance carrier before scheduling to verify coverage.   PREVENTATIVE SERVICES FREQUENCY &  COVERAGE DETAILS LAST COMPLETION DATE   Diabetes Screening    Fasting Blood Sugar / Glucose    One screening every 12 months if never tested or if previously tested but not diagnosed with pre-diabetes   One screening every 6 months if diagnosed with pre-diabetes Lab Results   Component Value Date    GLUCOSE 103 (H) 05/25/2016     (H) 04/04/2024        Cardiovascular Disease Screening    Lipid Panel  Cholesterol  Lipoprotein (HDL)  Triglycerides Covered every 5 years for all Medicare beneficiaries without apparent signs or symptoms of cardiovascular disease Lab Results   Component Value Date    CHOLEST 178 04/04/2024    HDL 39 (L) 04/04/2024     (H) 04/04/2024    TRIG 110 04/04/2024         Electrocardiogram (EKG)   Covered if needed at Welcome to Medicare, and non-screening if indicated for medical reasons 06/26/2019      Ultrasound Screening for Abdominal Aortic Aneurysm (AAA) Covered once in a lifetime for one of the following risk factors    Men who are 65-75 years old and have ever smoked    Anyone with a family history -     Colorectal Cancer Screening  Covered for ages 50-85; only need ONE of the following:    Colonoscopy   Covered every 10 years    Covered every 2 years if patient is at high risk or previous colonoscopy was abnormal -    Health Maintenance   Topic Date Due    Colorectal Cancer Screening  Never done       Flexible Sigmoidoscopy   Covered every 4 years -    Fecal Occult Blood Test Covered annually -   Prostate Cancer Screening    Prostate-Specific Antigen (PSA) Annually Lab Results   Component Value Date    PSA 1.1 05/25/2016     Health  Maintenance   Topic Date Due    PSA  04/19/2025      Immunizations    Influenza Covered once per flu season  Please get every year -  No recommendations at this time    Pneumococcal Each vaccine ([id number] & [id number]) covered once after 65 Prevnar 13: -    [id number]: -     Pneumococcal Vaccination(1 of 2 - PCV) Never done    Hepatitis B One screening covered for patients with certain risk factors   -  No recommendations at this time    Tetanus Toxoid Not covered by Medicare Part B unless medically necessary (cut with metal); may be covered with your pharmacy prescription benefits -    Tetanus, Diptheria and Pertusis TD and TDaP Not covered by Medicare Part B -  No recommendations at this time    Zoster Not covered by Medicare Part B; may be covered with your pharmacy  prescription benefits -  Zoster Vaccines(1 of 2) Never done     Diabetes      Hemoglobin A1C Annually; if last result is elevated, may be asked to retest more frequently.    Medicare covers every 3 months Lab Results   Component Value Date     (H) 04/04/2024    A1C 7.8 (H) 04/04/2024       No recommendations at this time    Creat/alb ratio Annually Lab Results   Component Value Date    MICROALBCREA  04/04/2024      Comment:      Unable to calculate due to Urine Microalbumin <0.3 mg/dL           LDL Annually Lab Results   Component Value Date     (H) 04/04/2024       Dilated Eye Exam Annually Last Diabetic Eye Exam:  No data recorded  No data recorded       Annual Monitoring of Persistent Medications (ACE/ARB, digoxin diuretics, anticonvulsants)    Potassium Annually Lab Results   Component Value Date    K 5.3 (H) 04/04/2024         Creatinine   Annually Lab Results   Component Value Date    CREATSERUM 1.53 (H) 04/04/2024         BUN Annually Lab Results   Component Value Date    BUN 28 (H) 04/04/2024       Drug Serum Conc Annually No results found for: \"DIGOXIN\", \"DIG\", \"VALP\"

## 2024-04-09 NOTE — H&P
HPI:   Geovanny Prabhakar is a 73 year old male who presents for a Medicare Subsequent Annual Wellness visit (Pt already had Initial Annual Wellness).    Overall doing well. Foot is healing. Chronic ulcer is 85-90% better. Pain is still intermittent 2-3/10. Will be undergoing orthotics    Sleep is about 6.5-7 hours.  Nocturia - 0-1    Some intermittent foot swelling, lasix as needed. 3 days at a time as needed. A little swollen.  Wife Isabell is a wound care nurse and has been helping with dressing changes managing the area.  Given the difficulty of    At night, the wound is covered. The rubbing of his foot can cause some irritation.  No fevers or chills.  No discharge, usually blood very trace amount from time to time.    Has been following up with podiatry.    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   - Nutrition: anything and everything - veggies, fruits, chicken - red meat. Water intake is fair.   - Physical Activity: walking around the block more,  Pedal bike      No topic due editable text        Fall Risk Assessment:   He has been screened for Falls and is low risk.    Cognitive Assessment:   He had a completely normal cognitive assessment - see flowsheet entries     Functional Ability/Status:   Geovanny Prabhakar has a completely normal functional assessment. See flowsheet for details.      Depression Screening (PHQ-2/PHQ-9): Over the LAST 2 WEEKS   Little interest or pleasure in doing things: Not at all  Feeling down, depressed, or hopeless: Not at all  PHQ-2 SCORE: 0      Advanced Directive:  He does NOT have a Living Will. [Do you have a living will?: No]  He does NOT have a Power of  for Health Care. [Do you have a healthcare power of ?: No]    Tobacco:  He smoked tobacco in the past but quit greater  than 12 months ago.  Social History    Tobacco Use      Smoking status: Former      Smokeless tobacco: Never       Mr. Prabhakar already takes aspirin and has it on his medication list.   CAGE Alcohol Screen:   CAGE screening score of 0 on 4/9/2024, showing low risk of alcohol abuse.       Patient Care Team: Patient Care Team:  Aaron Helton MD as PCP - General (Internal Medicine)    Patient Active Problem List   Diagnosis    Type 2 diabetes mellitus without complication (HCC)    Pure hypercholesterolemia    Coronary artery disease involving native coronary artery of native heart without angina pectoris    Paroxysmal atrial fibrillation (HCC)    Essential hypertension with goal blood pressure less than 140/90    Acute anemia    Colon cancer (HCC)    CAD (coronary artery disease)    DM2 (diabetes mellitus, type 2) (HCC)    HTN (hypertension)    Encounter for screening for malignant neoplasm of prostate      Wt Readings from Last 3 Encounters:   04/09/24 185 lb 9.6 oz (84.2 kg)   02/06/24 178 lb (80.7 kg)   01/30/24 184 lb 2 oz (83.5 kg)      Last Cholesterol Labs:   Lab Results   Component Value Date    CHOLEST 178 04/04/2024    HDL 39 (L) 04/04/2024     (H) 04/04/2024    TRIG 110 04/04/2024          Last Chemistry Labs:   Lab Results   Component Value Date    AST 13 04/04/2024    ALT 10 04/04/2024    CA 10.0 04/04/2024    ALB 4.6 04/04/2024    TSH 1.773 04/04/2024    CREATSERUM 1.53 (H) 04/04/2024     (H) 04/04/2024        CBC  (most recent labs)   Lab Results   Component Value Date    WBC 7.7 04/04/2024    HGB 14.1 04/04/2024    .0 04/04/2024        ALLERGIES:   He has No Known Allergies.    CURRENT MEDICATIONS:   Outpatient Medications Marked as Taking for the 4/9/24 encounter (Office Visit) with Aaron Helton MD   Medication Sig    aspirin 81 MG Oral Tab EC Take 1 tablet (81 mg total) by mouth daily.    metoprolol succinate  MG Oral Tablet 24 Hr Take 1 tablet (100 mg total) by mouth 2  (two) times daily.    ALPRAZolam 0.5 MG Oral Tab Take 1 tablet (0.5 mg total) by mouth 2 (two) times daily as needed for Anxiety or Sleep.    SPIRONOLACTONE 25 MG Oral Tab TAKE 1 TABLET(25 MG) BY MOUTH DAILY    GLIPIZIDE ER 10 MG Oral Tablet 24 Hr TAKE 1 TABLET(10 MG) BY MOUTH DAILY    LISINOPRIL 20 MG Oral Tab TAKE 1 TABLET(20 MG) BY MOUTH TWICE DAILY    HYDROcodone-acetaminophen (NORCO) 5-325 MG Oral Tab Take 1 tablet by mouth every 6 (six) hours as needed for Pain.    Glucose Blood (ONETOUCH ULTRA BLUE) In Vitro Strip Check BS once daily1    ONETOUCH DELICA LANCETS 33G Does not apply Misc Test BS once daily      MEDICAL INFORMATION:   He  has a past medical history of Atherosclerosis of coronary artery, CAD (coronary artery disease), Cancer (HCC), Colon cancer (HCC), COVID-19 virus infection, Diabetes (HCC), DM2 (diabetes mellitus, type 2) (HCC), Essential hypertension, HTN (hypertension), Hyperlipidemia, Other abnormal glucose, and Paroxysmal atrial fibrillation (HCC).    He  has a past surgical history that includes other; other; other; other; and other.    His family history is not on file.   SOCIAL HISTORY:   He  reports that he has quit smoking. He has never used smokeless tobacco. He reports that he does not drink alcohol and does not use drugs.     REVIEW OF SYSTEMS:   GENERAL: feels well otherwise  SKIN: denies any unusual skin lesions; + interval improvement of right lateral fifth MTP joint wound  EYES: denies blurred vision or double vision  HEENT: denies nasal congestion, sinus pain or ST  LUNGS: denies shortness of breath with exertion  CARDIOVASCULAR: denies chest pain on exertion  GI: denies abdominal pain, denies heartburn  : 0-1 per night nocturia, no complaint of urinary incontinence  MUSCULOSKELETAL: denies back pain  NEURO: denies headaches  PSYCHE: denies depression or anxiety  HEMATOLOGIC: denies hx of anemia  ENDOCRINE: denies thyroid history  ALL/ASTHMA: denies hx of allergy or  asthma    EXAM:   /70   Pulse 58   Temp 98.1 °F (36.7 °C)   Ht 5' 6.5\" (1.689 m)   Wt 185 lb 9.6 oz (84.2 kg)   SpO2 100%   BMI 29.51 kg/m²   Estimated body mass index is 29.51 kg/m² as calculated from the following:    Height as of this encounter: 5' 6.5\" (1.689 m).    Weight as of this encounter: 185 lb 9.6 oz (84.2 kg).    Medicare Hearing Assessment  (Required for AWV/SWV)    Hearing Screening    Screening Method: Questionnaire  I have a problem hearing over the telephone: No I have trouble following the conversations when two or more people are talking at the same time: No   I have trouble understanding things on the TV: No I have to strain to understand conversations: No   I have to worry about missing the telephone ring or doorbell: No I have trouble hearing conversations in a noisy background such as a crowded room or restaurant: No   I get confused about where sounds come from: No I misunderstand some words in a sentence and need to ask people to repeat themselves: No   I especially have trouble understanding the speech of women and children: No I have trouble understanding the speaker in a large room such as at a meeting or place of [employer]: No   Many people I talk to seem to mumble (or don't speak clearly): No People get annoyed because I misunderstand what they say: No   I misunderstand what others are saying and make inappropriate responses: No I avoid social activities because I cannot hear well and fear I will reply improperly: No   Family members and friends have told me they think I may have hearing loss: No                    Visual Acuity                           General Appearance:  Alert, cooperative, no distress, appears stated age   Head:  Normocephalic, without obvious abnormality, atraumatic   Eyes:  PERRL, conjunctiva/corneas clear, EOM's intact, both eyes   Ears:  Normal TM's and external ear canals, both ears   Nose: Nares normal, septum midline, mucosa normal, no drainage or  sinus tenderness   Throat: Lips, mucosa, and tongue normal; teeth and gums normal   Neck: Supple, symmetrical, trachea midline, no adenopathy, thyroid: not enlarged, symmetric, no tenderness/mass/nodules, no carotid bruit or JVD   Back:   Symmetric, no curvature, ROM normal, no CVA tenderness   Lungs:   Clear to auscultation bilaterally, respirations unlabored   Chest Wall:  No tenderness or deformity   Heart:  Regular rate and rhythm, S1, S2 normal, no murmur, rub or gallop   Abdomen:   Soft, non-tender, bowel sounds active all four quadrants,  no masses, no organomegaly   Genitalia: Deferred   Rectal: Deferred   Extremities: Extremities normal, atraumatic, no cyanosis or edema   Pulses: 2+ and symmetric   Skin: + interval improvement of right lateral fifth MTP joint wound   Lymph nodes: Cervical, supraclavicular, and axillary nodes normal   Neurologic: Normal, CN II through XII intact, 5 out of 5 muscle strength throughout, 2+ DTRs patellar tendons, normal gait          Vaccination History     Immunization History   Administered Date(s) Administered    Covid-19 Vaccine Moderna 100 mcg/0.5 ml 03/09/2021, 04/06/2021    Covid-19 Vaccine Moderna 50 Mcg/0.25 Ml 12/01/2021        ASSESSMENT AND OTHER RELEVANT CHRONIC CONDITIONS:   Geovanny Prabhakar is a 73 year old male who presents for a Medicare Assessment.     Imaging:  Duplex ultrasound 1/20/2024    Impression   CONCLUSION:  1. On the right, there is no hemodynamically significant aortoiliac inflow disease.  There is focal occlusion of the mid superficial femoral artery.  There is single-vessel runoff via the anterior tibial artery.  The peroneal artery reconstitutes  distally.  2. On the left, there is greater than 70 percent aortoiliac inflow disease.  The main infrapopliteal runoff vessels are all occluded..              Right foot x-ray 1/28/2024    Impression   CONCLUSION:  1. Macro right foot.  No destructive/erosive osseous changes are seen to suggest  osteomyelitis radiographically.  2. Lesser incidental findings as above.       CTA lower extremities 2/6/2024  Narrative   PROCEDURE: CTA LOWER EXTREMITY BILAT (CPT=73706)     COMPARISON: None.     INDICATIONS: PAD (peripheral artery disease).     TECHNIQUE: CT images were obtained without and with non-ionic intravenous contrast material. Multi-planar reformatted and 3-D images were created with vessel analysis performed on an independent workstation.  Automated exposure control for dose reduction   was used. Adjustment of the mA and/or kV was done based on the patient's size. Use of iterative reconstruction technique for dose reduction was used.  Dose information is transmitted to the ACR (American College of Radiology) NRDR (National Radiology  Data Registry) which includes the Dose Index Registry.     FINDINGS:     RIGHT LEG    AORTO-ILIAC: There is severe atherosclerotic calcification of the common iliac artery extending into the right internal and external iliac arteries. Moderate grade stenosis of the internal and external iliac arteries without occlusion.  FEMORAL ARTERY:   Moderate atherosclerotic calcification and noncalcified atheromatous plaques throughout the common femoral artery as well as within the superficial femoral artery throughout its entire course. High-grade stenosis and narrowing  involving the proximal superficial femoral artery extending into the mid and distal aspects of the SFA.  There is complete occlusion seen within the mid superficial femoral artery seen on series 5, image 115 extending into the distal superficial femoral  artery at the level of the adductor canal.  There is retrograde filling from intramuscular branches.  The total area of occlusion measures approximately 6.3 cm.  No aneurysm.  The profundus femoris artery is patent.    POPLITEAL ARTERY:   Moderate grade atherosclerotic calcification and noncalcified atheromatous plaque seen throughout the origin of the popliteal  artery. No focal occlusion or aneurysmal dilation.  RUN-OFF ARTERIES:   Severe atherosclerotic calcification seen within the distal popliteal artery extending into the tibioperoneal trunk.  There is extensive atherosclerotic calcification seen throughout the posterior and anterior tibial arteries as well   as peroneal arteries which are markedly diminutive.  There is nondiagnostic evaluation of the calf vessels due to extensive atherosclerotic calcification.     LEFT LEG    AORTO-ILIAC: There is severe atherosclerotic calcification of the common iliac artery extending into the left internal and external iliac arteries. Moderate grade stenosis of the internal and external iliac arteries without occlusion.  FEMORAL ARTERY:   Moderate atherosclerotic calcification and noncalcified atheromatous plaques throughout the common femoral artery as well as within the superficial femoral artery throughout its entire course. High-grade stenosis and narrowing  involving the proximal superficial femoral artery extending into the mid and distal aspects of the SFA.  No complete occlusion.    POPLITEAL ARTERY:   Moderate grade atherosclerotic calcification and noncalcified atheromatous plaque seen throughout the origin of the popliteal artery. No focal occlusion or aneurysmal dilation.  RUN-OFF ARTERIES: Severe atherosclerotic calcification seen within the distal popliteal artery extending into the tibioperoneal trunk.  There is extensive atherosclerotic calcification seen throughout the posterior and anterior tibial arteries as well  as peroneal arteries which are markedly diminutive.  There is nondiagnostic evaluation of the calf vessels due to extensive atherosclerotic calcification.     OTHER FINDINGS: There are bilateral inguinal hernias containing fat. No bowel herniation. Bladder wall thickening greater than what is expected for under distention, likely due to chronic bladder outlet obstruction from an enlarged prostate. No  mass or  fluid collection.  No enlarged lymph nodes. Degenerative disc and facet disease is partially visualized in the lower lumbar spine. Degenerative changes are seen within the sacroiliac joints and pubic symphysis. Degenerative changes are seen in the  bilateral hips.  Degenerative changes are seen within the bilateral knees, ankle and feet.               Impression   CONCLUSION:     Extensive atherosclerotic calcification seen within the aortobiiliac system and throughout the bilateral lower extremities.     Approximately 6.3 cm area of complete occlusion involving the mid to distal right superficial femoral artery with retrograde filling.     Essentially nondiagnostic evaluation of the bilateral calf vessels due to extensive atherosclerotic calcification and diminutive nature.     Multifocal areas of high-grade stenosis within the left superficial femoral artery without complete occlusion.     Multiple other incidental findings as described in the body of the report.     X-ray right foot 3/29/2024        Impression   CONCLUSION:     Small cutaneous ulcer over the lateral aspect of the fifth digit, without radiographic evidence of acute osteomyelitis.  MRI of the forefoot is more sensitive for the detection of acute osteomyelitis.     Moderate first metatarsophalangeal joint osteoarthritis.          PLAN SUMMARY:   Diagnoses and all orders for this visit:    Annual physical exam    PAD (peripheral artery disease) (HCC)    Paroxysmal atrial fibrillation (HCC)    Coronary artery disease involving native coronary artery of native heart without angina pectoris    Peripheral polyneuropathy    Type 2 diabetes mellitus without complication, without long-term current use of insulin (HCC)    Essential hypertension with goal blood pressure less than 140/90    Hyperlipidemia, unspecified hyperlipidemia type    Vitamin B12 deficiency    Primary hypertension    Spinal stenosis of lumbar region, unspecified whether  neurogenic claudication present    Encounter for annual health examination         Chronic nonhealing wound  Peripheral arterial disease  Ongoing over the course of months, initially patient declined any sort of invasive management  -Ultrasound duplex 1/24/2024: Focal occlusion of the mid superficial femoral artery with single-vessel runoff via anterior tibial artery and peroneal artery.  On the left there is greater than 70% aortoiliac inflow disease, main infrapopliteal runoff vessels are all occluded  - Status post right lower extremity angioplasty and stenting 2/7/2024 with Dr. Greenfield.  Noted to have chronic long segment SFA occlusion and distal tibial on the right lower extremity.  -Recently evaluated by podiatry, Dr. George last seen 3/29/2024: Status postdebridement, orthotics with inserts  - Continue with furosemide 40 mg once a day as needed for leg swelling.  This will help promote healing to reduce down any pressure to the area.     CAD  Status post CABG  - Would benefit from use of aspirin  - On metoprolol 100 mg twice a day     Paroxysmal A-fib  Status post Maze procedure.  - On metoprolol 100 mg twice a day  - Declining use of anticoagulation.    - Pacemaker in place     Type 2 diabetes:   Recent A1c:   HEMOGLOBIN A1C (%)   Date Value   10/18/2023 6.8 (A)     HgbA1C (%)   Date Value   04/04/2024 7.8 (H)     Recent urine microalbumin: No components found for: \"URINEMICROALBUMIN\"  Current medications: Glipizide 10 mg daily  Eye exam: Denies any referrals for ophthalmology  Foot exam: Follows with podiatry, Dr. George  -We will increase Accu-Cheks at home, may need refill on test strips     Hypertension  -Blood pressure today at goal  - Check blood pressures at home  - Continue with home metoprolol, lisinopril 20 mg daily, spironolactone 25 mg daily     Hyperlipidemia  - Last lipid panel with , total cholesterol 178  - Not currently on statin     History of colon cancer  Status post hemicolectomy  2019.  - No new recurrences, patient declining any further colonoscopies  - CEA was undetectable     Peripheral neuropathy  - Can be contributing to lower extremity symptoms.  - Trial Lyrica 50 mg twice a day - not currently taking  -Vitamin B12 level at goal  - will try to supplement with vitamin B12, cyanocobalamin 1000 mcg once a day.     Spinal stenosis of lumbar region  Chronic intermittent low back pain  -Would recommend initial trial of conservative therapy:     -Acetaminophen 500-650 mg every 4-6 hours as needed for pain relief  -Ibuprofen 200-400 mg every 8 hours as needed for anti-inflammatory and pain relief  -For more severe pain, on Norco as needed  -Trial of physical therapy can be considered if home exercise regimen insufficient  - This has remained stable    HTN Screen: BP at goal  DM Screen: As above  HLD Screen: As above  HCV Screen: Considered low risk  HIV Screen: considered low risk  G/C/Syphilis: Considered low risk    Colon Cancer Screening (45-70): Declining any further colonoscopies  Prostate Cancer Screening: (50-70): Check PSA at next visit  Lung Cancer Screening (55-79 with 30 p/year and active < 15 years): Not indicated  AAA Screening (65-75 Hx of smoking): Not indicated    Influenza: Annually   Td/Tdap: Last Tdap - unknown  Zoster (50+): Recommended 2 doses Shringrix  HPV (19-26): Not indicated  Pneumococcal: Due for Prevnar 20    Immunization History   Administered Date(s) Administered    Covid-19 Vaccine Moderna 100 mcg/0.5 ml 03/09/2021, 04/06/2021    Covid-19 Vaccine Moderna 50 Mcg/0.25 Ml 12/01/2021           Diet assessment: good     PLAN:  The patient indicates understanding of these issues and agrees to the plan.  Reinforced healthy diet, lifestyle, and exercise.    No follow-ups on file.     Aaron Helton MD, 4/8/2024     General Health     In the past six months, have you lost more than 10 pounds without trying?: 2 - No  Has your appetite been poor?: No  How does the patient  maintain a good energy level?: Daily Walks  How would you describe your daily physical activity?: Light  How would you describe your current health state?: Fair  How do you maintain positive mental well-being?: Visiting Family     Supplementary Documentation:   Geovanny Prabhakar's SCREENING SCHEDULE   Tests on this list are recommended by your physician but may not be covered, or covered at this frequency, by your insurer.   Please check with your insurance carrier before scheduling to verify coverage.   PREVENTATIVE SERVICES FREQUENCY &  COVERAGE DETAILS LAST COMPLETION DATE   Diabetes Screening    Fasting Blood Sugar / Glucose    One screening every 12 months if never tested or if previously tested but not diagnosed with pre-diabetes   One screening every 6 months if diagnosed with pre-diabetes Lab Results   Component Value Date    GLUCOSE 103 (H) 05/25/2016     (H) 04/04/2024        Cardiovascular Disease Screening    Lipid Panel  Cholesterol  Lipoprotein (HDL)  Triglycerides Covered every 5 years for all Medicare beneficiaries without apparent signs or symptoms of cardiovascular disease Lab Results   Component Value Date    CHOLEST 178 04/04/2024    HDL 39 (L) 04/04/2024     (H) 04/04/2024    TRIG 110 04/04/2024         Electrocardiogram (EKG)   Covered if needed at Welcome to Medicare, and non-screening if indicated for medical reasons 06/26/2019      Ultrasound Screening for Abdominal Aortic Aneurysm (AAA) Covered once in a lifetime for one of the following risk factors    Men who are 65-75 years old and have ever smoked    Anyone with a family history -     Colorectal Cancer Screening  Covered for ages 50-85; only need ONE of the following:    Colonoscopy   Covered every 10 years    Covered every 2 years if patient is at high risk or previous colonoscopy was abnormal -    Health Maintenance   Topic Date Due    Colorectal Cancer Screening  Never done       Flexible Sigmoidoscopy   Covered every 4  years -    Fecal Occult Blood Test Covered annually -   Prostate Cancer Screening    Prostate-Specific Antigen (PSA) Annually Lab Results   Component Value Date    PSA 1.1 05/25/2016     Health Maintenance   Topic Date Due    PSA  04/19/2025      Immunizations    Influenza Covered once per flu season  Please get every year -  No recommendations at this time    Pneumococcal Each vaccine ([id number] & [id number]) covered once after 65 Prevnar 13: -    [id number]: -     Pneumococcal Vaccination(1 of 2 - PCV) Never done    Hepatitis B One screening covered for patients with certain risk factors   -  No recommendations at this time    Tetanus Toxoid Not covered by Medicare Part B unless medically necessary (cut with metal); may be covered with your pharmacy prescription benefits -    Tetanus, Diptheria and Pertusis TD and TDaP Not covered by Medicare Part B -  No recommendations at this time    Zoster Not covered by Medicare Part B; may be covered with your pharmacy  prescription benefits -  Zoster Vaccines(1 of 2) Never done     Diabetes      Hemoglobin A1C Annually; if last result is elevated, may be asked to retest more frequently.    Medicare covers every 3 months Lab Results   Component Value Date     (H) 04/04/2024    A1C 7.8 (H) 04/04/2024       No recommendations at this time    Creat/alb ratio Annually Lab Results   Component Value Date    MICROALBCREA  04/04/2024      Comment:      Unable to calculate due to Urine Microalbumin <0.3 mg/dL           LDL Annually Lab Results   Component Value Date     (H) 04/04/2024       Dilated Eye Exam Annually Last Diabetic Eye Exam:  No data recorded  No data recorded       Annual Monitoring of Persistent Medications (ACE/ARB, digoxin diuretics, anticonvulsants)    Potassium Annually Lab Results   Component Value Date    K 5.3 (H) 04/04/2024         Creatinine   Annually Lab Results   Component Value Date    CREATSERUM 1.53 (H) 04/04/2024         BUN Annually  Lab Results   Component Value Date    BUN 28 (H) 04/04/2024       Drug Serum Conc Annually No results found for: \"DIGOXIN\", \"DIG\", \"VALP\"

## 2024-05-02 ENCOUNTER — HOSPITAL ENCOUNTER (OUTPATIENT)
Dept: ULTRASOUND IMAGING | Facility: HOSPITAL | Age: 74
Discharge: HOME | End: 2024-05-02
Attending: NURSE PRACTITIONER
Payer: MEDICARE

## 2024-05-02 DIAGNOSIS — I73.9 PAD (PERIPHERAL ARTERY DISEASE) (HCC): ICD-10-CM

## 2024-05-02 PROCEDURE — 93926 LOWER EXTREMITY STUDY: CPT | Performed by: NURSE PRACTITIONER

## 2024-05-06 ENCOUNTER — TELEPHONE (OUTPATIENT)
Dept: INTERNAL MEDICINE CLINIC | Facility: CLINIC | Age: 74
End: 2024-05-06

## 2024-05-06 NOTE — TELEPHONE ENCOUNTER
Received Faxed Statement of Certifying Physician for Therapeutic Shoes & Prescription for Therapeutic Diabetic Shoes & Inserts from [facility].     Forms placed in Dr. Helton's mailbox for review.

## 2024-05-10 RX ORDER — ACETAMINOPHEN 325 MG/1
325 TABLET ORAL EVERY 6 HOURS PRN
COMMUNITY

## 2024-05-15 ENCOUNTER — LAB ENCOUNTER (OUTPATIENT)
Dept: LAB | Age: 74
End: 2024-05-15
Attending: RADIOLOGY

## 2024-05-15 DIAGNOSIS — F41.9 ANXIETY: ICD-10-CM

## 2024-05-15 DIAGNOSIS — Z01.818 PRE-OP TESTING: ICD-10-CM

## 2024-05-15 LAB
ANION GAP SERPL CALC-SCNC: 5 MMOL/L (ref 0–18)
BASOPHILS # BLD AUTO: 0.08 X10(3) UL (ref 0–0.2)
BASOPHILS NFR BLD AUTO: 1.2 %
BUN BLD-MCNC: 22 MG/DL (ref 9–23)
BUN/CREAT SERPL: 15 (ref 10–20)
CALCIUM BLD-MCNC: 9.5 MG/DL (ref 8.7–10.4)
CHLORIDE SERPL-SCNC: 111 MMOL/L (ref 98–112)
CO2 SERPL-SCNC: 22 MMOL/L (ref 21–32)
CREAT BLD-MCNC: 1.47 MG/DL
DEPRECATED RDW RBC AUTO: 46 FL (ref 35.1–46.3)
EGFRCR SERPLBLD CKD-EPI 2021: 51 ML/MIN/1.73M2 (ref 60–?)
EOSINOPHIL # BLD AUTO: 0.31 X10(3) UL (ref 0–0.7)
EOSINOPHIL NFR BLD AUTO: 4.6 %
ERYTHROCYTE [DISTWIDTH] IN BLOOD BY AUTOMATED COUNT: 14.5 % (ref 11–15)
FASTING STATUS PATIENT QL REPORTED: NO
GLUCOSE BLD-MCNC: 139 MG/DL (ref 70–99)
HCT VFR BLD AUTO: 38.6 %
HGB BLD-MCNC: 12.9 G/DL
IMM GRANULOCYTES # BLD AUTO: 0.01 X10(3) UL (ref 0–1)
IMM GRANULOCYTES NFR BLD: 0.1 %
LYMPHOCYTES # BLD AUTO: 1.1 X10(3) UL (ref 1–4)
LYMPHOCYTES NFR BLD AUTO: 16.4 %
MCH RBC QN AUTO: 28.9 PG (ref 26–34)
MCHC RBC AUTO-ENTMCNC: 33.4 G/DL (ref 31–37)
MCV RBC AUTO: 86.5 FL
MONOCYTES # BLD AUTO: 0.57 X10(3) UL (ref 0.1–1)
MONOCYTES NFR BLD AUTO: 8.5 %
NEUTROPHILS # BLD AUTO: 4.64 X10 (3) UL (ref 1.5–7.7)
NEUTROPHILS # BLD AUTO: 4.64 X10(3) UL (ref 1.5–7.7)
NEUTROPHILS NFR BLD AUTO: 69.2 %
OSMOLALITY SERPL CALC.SUM OF ELEC: 292 MOSM/KG (ref 275–295)
PLATELET # BLD AUTO: 144 10(3)UL (ref 150–450)
PLATELETS.RETICULATED NFR BLD AUTO: 8.6 % (ref 0–7)
POTASSIUM SERPL-SCNC: 5.1 MMOL/L (ref 3.5–5.1)
RBC # BLD AUTO: 4.46 X10(6)UL
SODIUM SERPL-SCNC: 138 MMOL/L (ref 136–145)
WBC # BLD AUTO: 6.7 X10(3) UL (ref 4–11)

## 2024-05-15 PROCEDURE — 85025 COMPLETE CBC W/AUTO DIFF WBC: CPT

## 2024-05-15 PROCEDURE — 36415 COLL VENOUS BLD VENIPUNCTURE: CPT

## 2024-05-15 PROCEDURE — 80048 BASIC METABOLIC PNL TOTAL CA: CPT

## 2024-05-15 RX ORDER — ALPRAZOLAM 0.5 MG/1
0.5 TABLET ORAL 2 TIMES DAILY PRN
Qty: 60 TABLET | Refills: 5 | OUTPATIENT
Start: 2024-05-15

## 2024-05-15 NOTE — TELEPHONE ENCOUNTER
Current refill request refused due to refill is either a duplicate request or has active refills at the pharmacy.  Check previous templates.    Requested Prescriptions     Refused Prescriptions Disp Refills    ALPRAZolam 0.5 MG Oral Tab 60 tablet 5     Sig: Take 1 tablet (0.5 mg total) by mouth 2 (two) times daily as needed for Anxiety or Sleep.     Refused By: DESTINY PULIDO     Reason for Refusal: Duplicate refill request      Sent 4/10/24 with 5 additional refills.

## 2024-05-22 ENCOUNTER — HOSPITAL ENCOUNTER (OUTPATIENT)
Dept: INTERVENTIONAL RADIOLOGY/VASCULAR | Facility: HOSPITAL | Age: 74
Discharge: HOME | End: 2024-05-22
Attending: RADIOLOGY | Admitting: RADIOLOGY

## 2024-05-22 VITALS
WEIGHT: 180 LBS | OXYGEN SATURATION: 100 % | SYSTOLIC BLOOD PRESSURE: 135 MMHG | RESPIRATION RATE: 15 BRPM | DIASTOLIC BLOOD PRESSURE: 55 MMHG | HEIGHT: 66.5 IN | TEMPERATURE: 97 F | HEART RATE: 50 BPM | BODY MASS INDEX: 28.59 KG/M2

## 2024-05-22 DIAGNOSIS — Z01.818 PRE-OP TESTING: Primary | ICD-10-CM

## 2024-05-22 DIAGNOSIS — I73.9 PAD (PERIPHERAL ARTERY DISEASE) (HCC): ICD-10-CM

## 2024-05-22 LAB — GLUCOSE BLDC GLUCOMTR-MCNC: 142 MG/DL (ref 70–99)

## 2024-05-22 PROCEDURE — 37224 HC TRANSLUMINAL ANGIOPLASTY FEM POP UNILATERAL: CPT | Performed by: RADIOLOGY

## 2024-05-22 PROCEDURE — 99153 MOD SED SAME PHYS/QHP EA: CPT | Performed by: RADIOLOGY

## 2024-05-22 PROCEDURE — 047P3Z1 DILATION OF RIGHT ANTERIOR TIBIAL ARTERY USING DRUG-COATED BALLOON, PERCUTANEOUS APPROACH: ICD-10-PCS | Performed by: RADIOLOGY

## 2024-05-22 PROCEDURE — 37232 HC TRANSL ANGIOPLASTY TIBIAL PERONEAL UNIL EA ADDL: CPT | Performed by: RADIOLOGY

## 2024-05-22 PROCEDURE — 047R3Z1 DILATION OF RIGHT POSTERIOR TIBIAL ARTERY USING DRUG-COATED BALLOON, PERCUTANEOUS APPROACH: ICD-10-PCS | Performed by: RADIOLOGY

## 2024-05-22 PROCEDURE — 36415 COLL VENOUS BLD VENIPUNCTURE: CPT

## 2024-05-22 PROCEDURE — 99152 MOD SED SAME PHYS/QHP 5/>YRS: CPT | Performed by: RADIOLOGY

## 2024-05-22 PROCEDURE — 047K3Z1 DILATION OF RIGHT FEMORAL ARTERY USING DRUG-COATED BALLOON, PERCUTANEOUS APPROACH: ICD-10-PCS | Performed by: RADIOLOGY

## 2024-05-22 PROCEDURE — 37228 HC TRANSLUMINAL ANGIO TIBIAL PERONEAL UNILAT INIT: CPT | Performed by: RADIOLOGY

## 2024-05-22 PROCEDURE — 82962 GLUCOSE BLOOD TEST: CPT

## 2024-05-22 RX ORDER — SODIUM CHLORIDE 9 MG/ML
1 INJECTION, SOLUTION INTRAVENOUS CONTINUOUS
Status: DISCONTINUED | OUTPATIENT
Start: 2024-05-22 | End: 2024-05-22

## 2024-05-22 RX ORDER — VERAPAMIL HYDROCHLORIDE 2.5 MG/ML
INJECTION, SOLUTION INTRAVENOUS
Status: COMPLETED
Start: 2024-05-22 | End: 2024-05-22

## 2024-05-22 RX ORDER — LIDOCAINE HYDROCHLORIDE 20 MG/ML
INJECTION, SOLUTION INFILTRATION; PERINEURAL
Status: COMPLETED
Start: 2024-05-22 | End: 2024-05-22

## 2024-05-22 RX ORDER — MIDAZOLAM HYDROCHLORIDE 1 MG/ML
INJECTION INTRAMUSCULAR; INTRAVENOUS
Status: COMPLETED
Start: 2024-05-22 | End: 2024-05-22

## 2024-05-22 RX ORDER — HYDROCODONE BITARTRATE AND ACETAMINOPHEN 5; 325 MG/1; MG/1
1 TABLET ORAL ONCE
Status: COMPLETED | OUTPATIENT
Start: 2024-05-22 | End: 2024-05-22

## 2024-05-22 RX ORDER — SODIUM CHLORIDE 9 MG/ML
INJECTION, SOLUTION INTRAVENOUS CONTINUOUS
Status: DISCONTINUED | OUTPATIENT
Start: 2024-05-22 | End: 2024-05-22

## 2024-05-22 RX ORDER — NITROGLYCERIN 20 MG/100ML
INJECTION INTRAVENOUS
Status: COMPLETED
Start: 2024-05-22 | End: 2024-05-22

## 2024-05-22 RX ORDER — CLOPIDOGREL BISULFATE 75 MG/1
75 TABLET ORAL DAILY
Qty: 28 TABLET | Refills: 0 | Status: SHIPPED | OUTPATIENT
Start: 2024-05-22

## 2024-05-22 RX ORDER — CLOPIDOGREL BISULFATE 75 MG/1
TABLET ORAL
Status: COMPLETED
Start: 2024-05-22 | End: 2024-05-22

## 2024-05-22 RX ORDER — FUROSEMIDE 40 MG/1
40 TABLET ORAL AS NEEDED
COMMUNITY

## 2024-05-22 RX ORDER — HEPARIN SODIUM 1000 [USP'U]/ML
INJECTION, SOLUTION INTRAVENOUS; SUBCUTANEOUS
Status: COMPLETED
Start: 2024-05-22 | End: 2024-05-22

## 2024-05-22 RX ADMIN — HYDROCODONE BITARTRATE AND ACETAMINOPHEN 1 TABLET: 5; 325 TABLET ORAL at 12:45:00

## 2024-05-22 RX ADMIN — SODIUM CHLORIDE 1 ML/KG/HR: 9 INJECTION, SOLUTION INTRAVENOUS at 06:30:00

## 2024-05-22 NOTE — DISCHARGE INSTRUCTIONS
INTERVENTIONAL RADIOLOGY  [facility]  [phone]          Procedure:  PERIPHERAL ANGIOGRAPHY, ANGIOPLASTY (PTCA/PTA) IN THE PERIPHERAL ARTERIES      Activity  DO NOT drive after the procedure.  You may resume driving late the following day according to the nurse or physician's instructions  Plan on resting and relaxing tonight and tomorrow  Resume your normal activity after 48 hours, or as instructed by your physician  Avoid squatting and bending and do not lift anything over 10 pounds for 1 WEEK  Avoid drinking alcohol for the next 24 hours  Avoid repeated stair use and excessive walking for the next 24 hours    What is Normal?  A small lump at the procedure site associated with mild tenderness when touched  The procedure site may be bruised or discolored  There may be a small amount of drainage on the bandage    Special Instructions  Drink plenty of fluids during the next 24 hours to \"flush\" the contrast from your system  Keep the bandage clean and dry   After 24 hours, you must remove the bandage  You should shower after removing the bandage, and wash the procedure site gently with soap and water  Do NOT apply any powders, ointments or creams to the site for 1 week.   DO NOT submerge the procedure site for 1 week (no bath tubs or pools)  If you choose to wear a bandage for a few days, make sure it remains clean and dry and that it is changed daily    Additional Instructions:  Do not take glucophage/metformin containing products for at least 48 hours after procedure, unless otherwise directed by your physician.    When you should NOTIFY YOUR PHYSICIAN  Bleeding can occur at the procedure site - both on the outside of the skin and/or beneath the surface of the skin  Swelling or a large lump at the procedure site can occur, which may be accompanied by moderate to severe pain    If either of the above occurs, lie down flat.    Have someone apply pressure to the  procedure site with both hands, as instructed by the nurse.    Hold pressure for 20 minutes and the bleeding should stop.    Notify your physician of the occurrence  If the bleeding does not stop, call 911 and continue to apply pressure    If you experience signs of a fever, temperature > 101°, chills, infection (redness, swelling, thick yellow drainage, or a foul odor from the procedure site)  If you notice any numbness, tingling, or loss of feeling to your leg or foot or groin access      Other:   You may resume your present diet, unless otherwise specified by your physician.  You may resume all of your medications as prescribed, unless otherwise directed by your physician.  A list of your medications was provided to you at discharge.  Gradually resume your previous aerobic exercise schedule as tolerated.    Contact Interventional Radiology at [phone] if you have:  Severe/unrelieved pain  Fever greater than 100 degrees, chills, redness or foul drainage at the site  Dizziness/lightheadedness  Bleeding or increased swelling at your incision site.

## 2024-05-22 NOTE — IVS NOTE
DISCHARGE NOTE      Pt is able to sit up and ambulate without difficulty.   Pt voided and tolerated fluids and food.   Procedural site remains dry and intact with good circulation, motion, and sensation.   No signs and symptoms of bleeding/hematoma noted.   Instruction provided, patient/family verbalizes understanding.   Dr. Greenfield spoke with patient/family post procedure.      Follow up Appointment: Made with Dr. Greenfield   Prescription sent and available for pick-up

## 2024-05-23 ENCOUNTER — TELEPHONE (OUTPATIENT)
Dept: INTERNAL MEDICINE CLINIC | Facility: CLINIC | Age: 74
End: 2024-05-23

## 2024-05-23 DIAGNOSIS — I73.9 PAD (PERIPHERAL ARTERY DISEASE) (HCC): Primary | ICD-10-CM

## 2024-05-23 DIAGNOSIS — M48.061 SPINAL STENOSIS OF LUMBAR REGION, UNSPECIFIED WHETHER NEUROGENIC CLAUDICATION PRESENT: ICD-10-CM

## 2024-05-23 RX ORDER — HYDROCODONE BITARTRATE AND ACETAMINOPHEN 5; 325 MG/1; MG/1
1 TABLET ORAL EVERY 8 HOURS PRN
Qty: 60 TABLET | Refills: 0 | Status: SHIPPED | OUTPATIENT
Start: 2024-05-23 | End: 2024-05-23

## 2024-05-23 RX ORDER — ACETAMINOPHEN AND CODEINE PHOSPHATE 300; 30 MG/1; MG/1
1 TABLET ORAL EVERY 8 HOURS PRN
Qty: 60 TABLET | Refills: 0 | Status: SHIPPED | OUTPATIENT
Start: 2024-05-23 | End: 2024-05-23

## 2024-05-23 RX ORDER — HYDROCODONE BITARTRATE AND ACETAMINOPHEN 5; 325 MG/1; MG/1
1 TABLET ORAL EVERY 8 HOURS PRN
COMMUNITY

## 2024-05-23 NOTE — TELEPHONE ENCOUNTER
Please notify patient that I sent in for the Norco.  Try to minimize use overall, hopefully can give him good relief in the postoperative setting.  If still no improvement, may need to see me sooner than 1-2 weeks

## 2024-05-23 NOTE — TELEPHONE ENCOUNTER
Called and spoke with pt wife/Isabell ( HIPAA verified). Relayed dr message and she is in agreement with treatment plan.

## 2024-05-23 NOTE — INTERVAL H&P NOTE
The above referenced H&P was reviewed by Taiwo Greenfield MD on 5/23/2024, the patient was examined and no significant changes have occurred in the patient's condition since the H&P was performed.  Risks, benefits, alternative treatments and consequences of no treatment were discussed.  We will proceed with procedure as planned.      Taiwo Greenfield MD  5/23/2024  11:17 AM

## 2024-05-23 NOTE — TELEPHONE ENCOUNTER
Spoke with pt's wife, Isabell.  She paid out pocket ($25) for [employer].    Spoke with Shravan Morgan, Dmitriy and canceled Tylenol #3 rx.     MD updated.

## 2024-05-23 NOTE — TELEPHONE ENCOUNTER
Patient's wife Isabell called, patient is complaining of severe pain in both feet.  Patient had stents put in yesterday, and is requesting a Norco prescription.  The surgeon instructed patient to contact primary care physician

## 2024-05-23 NOTE — TELEPHONE ENCOUNTER
Shravan Marcano [place] drug change request  Hydrocodone / Acetaminophen 5-325 mg    Drug not covered by patient plan alternatives  Acetaminophen Codeine  Hydrocodone Ibuprofen    Placed in green folder

## 2024-05-23 NOTE — TELEPHONE ENCOUNTER
To Dr. OCHOA: please advise on request for Norco prescription.  Pt was told by Dr. Greenfield's office to contact PCP.     Spoke with patient and spouse, Isabell on speaker phone.    Refer to IR report from Dr. Greenfield yesterday.   Status post RT foot angriogram & stent placement x2.   Pt had procedure done to improve blood flow to existing non-healing ulcer to Rt fifth metatarsal.   Post-operatively pt was given a dose of Norco pripr to discharge. No home prescriptions provided.  Rates pain 10/10 (at it's worse), currently 8/10. Constant.   Took Tylenol ES 2 tablets this morning, \"helped a little\".     Norco last prescribed 2022 for acute pain to Rt side (history of colon cancer and resection).      [id number] - [place], [place] - [address] AT [facility] OF [address], [phone], [phone]

## 2024-05-23 NOTE — TELEPHONE ENCOUNTER
Please notify patient that per pharmacy, we had to switch to a different alternative medication.  We will proceed with Tylenol 3 which is covered, should take no more than 3 times in a day.  Try to minimize use overall, start first at nighttime to minimize drowsiness, sleepiness, lightheadedness.  Works very similar to Norco

## 2024-05-29 ENCOUNTER — TELEPHONE (OUTPATIENT)
Dept: INTERVENTIONAL RADIOLOGY/VASCULAR | Facility: HOSPITAL | Age: 74
End: 2024-05-29

## 2024-05-29 ENCOUNTER — LAB ENCOUNTER (OUTPATIENT)
Dept: LAB | Facility: HOSPITAL | Age: 74
End: 2024-05-29
Attending: INTERNAL MEDICINE
Payer: MEDICARE

## 2024-05-29 ENCOUNTER — TELEPHONE (OUTPATIENT)
Dept: INTERNAL MEDICINE CLINIC | Facility: CLINIC | Age: 74
End: 2024-05-29

## 2024-05-29 DIAGNOSIS — K62.5 RECTAL BLEEDING: ICD-10-CM

## 2024-05-29 DIAGNOSIS — R60.0 LEG EDEMA: ICD-10-CM

## 2024-05-29 DIAGNOSIS — I50.9 EDEMA DUE TO CONGESTIVE HEART FAILURE (HCC): ICD-10-CM

## 2024-05-29 DIAGNOSIS — R60.0 LEG EDEMA: Primary | ICD-10-CM

## 2024-05-29 LAB
ANION GAP SERPL CALC-SCNC: 7 MMOL/L (ref 0–18)
BASOPHILS # BLD AUTO: 0.04 X10(3) UL (ref 0–0.2)
BASOPHILS NFR BLD AUTO: 0.6 %
BNP SERPL-MCNC: 140 PG/ML
BUN BLD-MCNC: 23 MG/DL (ref 9–23)
BUN/CREAT SERPL: 16.3 (ref 10–20)
CALCIUM BLD-MCNC: 9.7 MG/DL (ref 8.7–10.4)
CHLORIDE SERPL-SCNC: 110 MMOL/L (ref 98–112)
CO2 SERPL-SCNC: 24 MMOL/L (ref 21–32)
CREAT BLD-MCNC: 1.41 MG/DL
DEPRECATED RDW RBC AUTO: 45.6 FL (ref 35.1–46.3)
EGFRCR SERPLBLD CKD-EPI 2021: 53 ML/MIN/1.73M2 (ref 60–?)
EOSINOPHIL # BLD AUTO: 0.22 X10(3) UL (ref 0–0.7)
EOSINOPHIL NFR BLD AUTO: 3.4 %
ERYTHROCYTE [DISTWIDTH] IN BLOOD BY AUTOMATED COUNT: 14.1 % (ref 11–15)
FASTING STATUS PATIENT QL REPORTED: NO
GLUCOSE BLD-MCNC: 110 MG/DL (ref 70–99)
HCT VFR BLD AUTO: 37.9 %
HGB BLD-MCNC: 12.5 G/DL
IMM GRANULOCYTES # BLD AUTO: 0.02 X10(3) UL (ref 0–1)
IMM GRANULOCYTES NFR BLD: 0.3 %
LYMPHOCYTES # BLD AUTO: 0.95 X10(3) UL (ref 1–4)
LYMPHOCYTES NFR BLD AUTO: 14.8 %
MCH RBC QN AUTO: 28.7 PG (ref 26–34)
MCHC RBC AUTO-ENTMCNC: 33 G/DL (ref 31–37)
MCV RBC AUTO: 87.1 FL
MONOCYTES # BLD AUTO: 0.51 X10(3) UL (ref 0.1–1)
MONOCYTES NFR BLD AUTO: 8 %
NEUTROPHILS # BLD AUTO: 4.66 X10 (3) UL (ref 1.5–7.7)
NEUTROPHILS # BLD AUTO: 4.66 X10(3) UL (ref 1.5–7.7)
NEUTROPHILS NFR BLD AUTO: 72.9 %
OSMOLALITY SERPL CALC.SUM OF ELEC: 296 MOSM/KG (ref 275–295)
PLATELET # BLD AUTO: 173 10(3)UL (ref 150–450)
POTASSIUM SERPL-SCNC: 5 MMOL/L (ref 3.5–5.1)
RBC # BLD AUTO: 4.35 X10(6)UL
SODIUM SERPL-SCNC: 141 MMOL/L (ref 136–145)
WBC # BLD AUTO: 6.4 X10(3) UL (ref 4–11)

## 2024-05-29 PROCEDURE — 36415 COLL VENOUS BLD VENIPUNCTURE: CPT

## 2024-05-29 PROCEDURE — 85025 COMPLETE CBC W/AUTO DIFF WBC: CPT

## 2024-05-29 PROCEDURE — 80048 BASIC METABOLIC PNL TOTAL CA: CPT

## 2024-05-29 PROCEDURE — 83880 ASSAY OF NATRIURETIC PEPTIDE: CPT

## 2024-05-29 NOTE — TELEPHONE ENCOUNTER
If he feels this does not warrant ER evaluation (severe pain, shortness of breath, chest pain), I would like to check blood test today to check up on his kidney function and blood counts.    Should see me tomorrow for evaluation.  Can double book 10:30 AM, 12 PM, or 4:30 PM    Continue to hold the Lasix until seen.

## 2024-05-29 NOTE — PROGRESS NOTES
Patient called to report BRBPR.  Denies any symptoms. Plavix and ASA was started after 5/22 right leg angiogram tight focal stenosis within the distal superficial femoral artery was angioplastied, followed by angioplasty of a totally occluded right posterior tibial artery as well as diffusely stenotic right anterior tibial artery.     Instructed him to stop Plavix for 48 hours, continue ASA.  He will contact Dr Helton to report his symptoms.  He knows to report to the ER if he has more rectal bleeding.       He did have a similar, one time episode after his 2/24 angiogram.

## 2024-05-29 NOTE — TELEPHONE ENCOUNTER
Please advise - called spouse per HIPAA -patient had stent placement right foot . He had on and off swelling , and took Furosemide 40 mg as needed .  Called DR. Greenfield office - was put on Plavix - had bleeding last night - now he needs to hold Plavix for 2 days. Swelling goes down over night . He took Lasix 40 mg FR/SA/WERNER( instructed by DR. OCHOA)  He still has wound right lateral foot and the site where angioplasty is not closed yet. Both feet are swollen  and Furosemide not working , has not taken it since Sunday . Also patient does not need to use the bathroom as often as he usually did when taking furosemide - to  and

## 2024-05-29 NOTE — TELEPHONE ENCOUNTER
To TIFFANIE Lee...    S/w patient and relayed MD message .    Pt declined to seek ED evaluation at this time but stated will dos so if his symptoms worsen. Pt will have labs drawn today and will see Dr OCHOA for appointment tomorrow at 4:30 PM. Aware to continue to hold Lasix until seen by Dr OCHOA.

## 2024-05-29 NOTE — TELEPHONE ENCOUNTER
Patients wife Isabell called. She wants to speak to a nurse. Isabell states her  has been on diuretics and they are not working;    #[phone]

## 2024-05-30 ENCOUNTER — OFFICE VISIT (OUTPATIENT)
Dept: INTERNAL MEDICINE CLINIC | Facility: CLINIC | Age: 74
End: 2024-05-30

## 2024-05-30 VITALS
OXYGEN SATURATION: 98 % | HEART RATE: 64 BPM | DIASTOLIC BLOOD PRESSURE: 70 MMHG | HEIGHT: 66.5 IN | SYSTOLIC BLOOD PRESSURE: 110 MMHG | TEMPERATURE: 98 F | WEIGHT: 183 LBS | BODY MASS INDEX: 29.06 KG/M2

## 2024-05-30 DIAGNOSIS — I50.9 EDEMA DUE TO CONGESTIVE HEART FAILURE (HCC): Primary | ICD-10-CM

## 2024-05-30 DIAGNOSIS — R60.0 LEG EDEMA: ICD-10-CM

## 2024-05-30 DIAGNOSIS — K62.5 RECTAL BLEEDING: ICD-10-CM

## 2024-05-30 DIAGNOSIS — E78.5 HYPERLIPIDEMIA, UNSPECIFIED HYPERLIPIDEMIA TYPE: ICD-10-CM

## 2024-05-30 DIAGNOSIS — I48.0 PAROXYSMAL ATRIAL FIBRILLATION (HCC): ICD-10-CM

## 2024-05-30 DIAGNOSIS — M48.061 SPINAL STENOSIS OF LUMBAR REGION, UNSPECIFIED WHETHER NEUROGENIC CLAUDICATION PRESENT: ICD-10-CM

## 2024-05-30 DIAGNOSIS — I73.9 PAD (PERIPHERAL ARTERY DISEASE) (HCC): ICD-10-CM

## 2024-05-30 DIAGNOSIS — I25.10 CORONARY ARTERY DISEASE INVOLVING NATIVE CORONARY ARTERY OF NATIVE HEART WITHOUT ANGINA PECTORIS: ICD-10-CM

## 2024-05-30 DIAGNOSIS — I10 ESSENTIAL HYPERTENSION WITH GOAL BLOOD PRESSURE LESS THAN 140/90: ICD-10-CM

## 2024-05-30 DIAGNOSIS — F41.9 ANXIETY: ICD-10-CM

## 2024-05-30 DIAGNOSIS — G62.9 PERIPHERAL POLYNEUROPATHY: ICD-10-CM

## 2024-05-30 PROCEDURE — 99215 OFFICE O/P EST HI 40 MIN: CPT | Performed by: INTERNAL MEDICINE

## 2024-05-30 RX ORDER — FUROSEMIDE 40 MG/1
40 TABLET ORAL DAILY
Qty: 90 TABLET | Refills: 1 | Status: SHIPPED | OUTPATIENT
Start: 2024-05-30 | End: 2025-05-25

## 2024-05-30 NOTE — PATIENT INSTRUCTIONS
You were seen in clinic today for postprocedural follow-up.  We did review the procedure and we are happy to hear that you are recovering well  - Please hold Plavix for a total of 48 hours as instructed by Dr. Greenfield.  Monitor for any recurrence of bleeding from the rectal area  - May benefit from use of Anusol rectal cream 1 application twice a day as needed in the event these are from hemorrhoids  - Your hemoglobin otherwise has remained stable    For the leg swelling, we recommended:  This is likely due to reperfusion of the legs in which the heart and the arteries of the legs are working towards more blood flow.  This is good as we want improvement of blood flow for wound healing and improvement of pain symptoms long-term.  - We should try to manage the leg swelling further:    Your Lasix prescription may be  and may not be as effective.  We did place a new prescription for you  - Starting tomorrow take furosemide 40 mg once a day through the weekend  - Give us a call on Monday, 6/3/2024 with daily weights, and how the leg swelling is going.  - We may require an echocardiogram if there is still no improvement of the leg swelling  - We may increase your furosemide to 80 mg, but we will wait for now on how things go through the weekend    We will place repeat blood tests that you can obtain nonfasting in 1 week, Thursday, 2024     Other measures:  Elevate legs during times of rest  Use of compression stockings can help push fluid back into the veins  Slight decrease in salt intake to avoid excessive water buildup  If excessive leg swelling continues, we may need to escalate the medications carefully while monitoring kidney function closely.    Periodically check your blood pressures to ensure that they are not too low while on water medication    Record your weight daily and try to see if there has been significant weight loss as result of water fluid removal    Return to clinic as scheduled in about 3  months for follow-up

## 2024-05-30 NOTE — PROGRESS NOTES
Chief Complaint:   Chief Complaint   Patient presents with    Foot Pain     Pt here due to right foot pain including swelling x 1 week since surgery.      HPI:     Mr. REDD is a 73 year old male PMHX coronary artery disease status post CABG, A-fib, type 2 diabetes, hypertension, hyperlipidemia, peripheral arterial disease, history of malignant neoplasm of colon, peripheral neuropathy who presents today for evaluation of leg swelling.    He had the original procedure in his R thigh, He was having burning tingling of the toes at that time. After this stent, still needed  further intervention in 5/22. There is bruising and swelling in both legs. There was a wound in the R foot is starting to heal. The wound from the procedure is still open. The R leg was swollen from the procedure. The L leg is more swollen.     He did try the lasix for 3 days and no water intake. The morning swelling is improved but worsens over time.     Wife Isabell is a retired wound care nurse.  Has been helping with the 2 wounds in the right leg.    Past Medical History:    Atherosclerosis of coronary artery    CAD (coronary artery disease)    Cancer (HCC)    Colon cancer (HCC)    COVID-19 virus infection    Oct 2020    Diabetes (HCC)    DM2 (diabetes mellitus, type 2) (HCC)    Essential hypertension    HTN (hypertension)    Hyperlipidemia    Other abnormal glucose    rubber band hemorroid    Paroxysmal atrial fibrillation (HCC)     Past Surgical History:   Procedure Laterality Date    Other      MAZE    Other      CABAG    Other      appendectomy    Other      pacer    Other       Status post hemicolectomy, July 2019     Social History:  Social History     Socioeconomic History    Marital status:    Tobacco Use    Smoking status: Former    Smokeless tobacco: Never   Vaping Use    Vaping status: Every Day    Substances: Nicotine    Devices: Refillable tank   Substance and Sexual Activity    Alcohol use: No     Alcohol/week: 0.0 standard  drinks of alcohol    Drug use: No     Family History:  No family history on file.  Allergies:  No Known Allergies  Current Meds:  Current Outpatient Medications   Medication Sig Dispense Refill    furosemide 40 MG Oral Tab Take 1 tablet (40 mg total) by mouth daily. 90 tablet 1    HYDROcodone-acetaminophen 5-325 MG Oral Tab Take 1 tablet by mouth every 8 (eight) hours as needed for Pain.      furosemide 40 MG Oral Tab Take 1 tablet (40 mg total) by mouth as needed.      clopidogrel 75 MG Oral Tab Take 1 tablet (75 mg total) by mouth daily. 28 tablet 0    acetaminophen 325 MG Oral Tab Take 1 tablet (325 mg total) by mouth every 6 (six) hours as needed for Pain.      ALPRAZolam 0.5 MG Oral Tab Take 1 tablet (0.5 mg total) by mouth 2 (two) times daily as needed for Anxiety or Sleep. 60 tablet 5    aspirin 81 MG Oral Tab EC Take 1 tablet (81 mg total) by mouth daily. 90 tablet 3    metoprolol succinate  MG Oral Tablet 24 Hr Take 1 tablet (100 mg total) by mouth 2 (two) times daily. 180 tablet 1    SPIRONOLACTONE 25 MG Oral Tab TAKE 1 TABLET(25 MG) BY MOUTH DAILY 90 tablet 3    GLIPIZIDE ER 10 MG Oral Tablet 24 Hr TAKE 1 TABLET(10 MG) BY MOUTH DAILY (Patient taking differently: Take 1 tablet (10 mg total) by mouth at bedtime.) 90 tablet 3    LISINOPRIL 20 MG Oral Tab TAKE 1 TABLET(20 MG) BY MOUTH TWICE DAILY 180 tablet 3    Glucose Blood (ONETOUCH ULTRA BLUE) In Vitro Strip Check BS once daily1 100 strip 3    ONETOUCH DELICA LANCETS 33G Does not apply Misc Test BS once daily 100 each 3      Counseling given: Not Answered       REVIEW OF SYSTEMS:   Positive Findings indicated in BOLD    Constitutional: Fever, Chills, Weight Gain, Weight Loss, Night Sweats, Fatigue, Malaise  ENT/Mouth:  Hearing Changes, Ear Pain, Nasal Congestion, Sinus Pain, Hoarseness, Sore throat, Rhinorrhea, Swallowing Difficulty  Eyes: Eye Pain, Swelling, Redness, Foreign Body, Discharge, Vision Changes  Cardiovascular: Chest Pain, SOB, PND,  Dyspnea on Exertion, Orthopnea, Claudication, Edema, Palpitations  Respiratory: Cough, Sputum, Wheezing, Shortness of breath  Gastrointestinal: Nausea, Vomiting, Diarrhea, Constipation, Pain, Heartburn, Dysphagia, Bloody stools, Tarry stools  Genitourinary: Dysmenorrhea, Dysuria, Urinary Frequency, Hematuria, Urinary Incontinence, Urgency,  Flank Pain  Musculoskeletal: Arthralgias, Myalgias, Joint Swelling, Joint Stiffness, Back Pain, Neck Pain  Integumentary: Skin Lesions, Pruritis, Hair Changes, Jaundice, Nail changes  Neuro: Weakness, Numbness, Paresthesias, Loss of Consciousness, Syncope, Dizziness, Headache, Falls  Psych: Anxiety, Depression, Insomnia, Suicidal Ideation, Homicidal ideation, Memory Changes  Heme/Lymph: Bruising, Bleeding, Lymphadenopathy  Endocrine: Polyuria, Polydipsia, Temperature Intolerance    EXAM:   Vital Signs:  Blood pressure 110/70, pulse 64, temperature 97.9 °F (36.6 °C), height 5' 6.5\" (1.689 m), weight 183 lb (83 kg), SpO2 98%.     Constitutional: No acute distress. Alert and oriented x 3.  Eyes: EOMI, PERRLA, clear sclera b/l  HENT: NCAT, Moist mucous membranes, Oropharynx without erythema or exudates  Neck: No JVD, no thyromegaly  Cardiovascular: S1, S2, no S3, no S4, Regular rate and rhythm, No murmurs/gallops/rubs.   Vascular: Equal pulses 1+ lower extremity pulses  Respiratory: Clear to auscultation bilaterally.  No wheezes/rales/rhonchi  Gastrointestinal: Soft, nontender, nondistended. Positive bowel sounds x 4. No rebound tenderness. No hepatomegaly, No splenomegaly  Genitourinary: No CVA tenderness bilaterally  Neurologic: No focal neurological deficits, CN II-XII intact, light touch intact, MSK Strength 5/5 and symmetric in all extremities, normal gait, 2+ patellar tendon  Musculoskeletal: Full range of motion of all extremities, 2+ pitting edema up to bilateral knees  Skin:   + R leg fifth MTP lateral ulcer, with granulation tissue, no surrounding erythema  + Right medial  malleolus wound clean/dry/intact  Psychiatric: Appropriate mood and affect  Heme/Lymph/Immune: No cervical LAD      DATA REVIEWED   Labs:  Recent Results (from the past 8760 hour(s))   Basic Metabolic Panel (8) [E]    Collection Time: 05/29/24  6:08 PM   Result Value Ref Range    Glucose 110 (H) 70 - 99 mg/dL    Sodium 141 136 - 145 mmol/L    Potassium 5.0 3.5 - 5.1 mmol/L    Chloride 110 98 - 112 mmol/L    CO2 24.0 21.0 - 32.0 mmol/L    Anion Gap 7 0 - 18 mmol/L    BUN 23 9 - 23 mg/dL    Creatinine 1.41 (H) 0.70 - 1.30 mg/dL    BUN/CREA Ratio 16.3 10.0 - 20.0    Calcium, Total 9.7 8.7 - 10.4 mg/dL    Calculated Osmolality 296 (H) 275 - 295 mOsm/kg    eGFR-Cr 53 (L) >=60 mL/min/1.73m2    Patient Fasting for BMP? No      *Note: Due to a large number of results and/or encounters for the requested time period, some results have not been displayed. A complete set of results can be found in Results Review.       Recent Results (from the past 8760 hour(s))   CBC W/ DIFFERENTIAL    Collection Time: 05/29/24  6:08 PM   Result Value Ref Range    WBC 6.4 4.0 - 11.0 x10(3) uL    RBC 4.35 3.80 - 5.80 x10(6)uL    HGB 12.5 (L) 13.0 - 17.5 g/dL    HCT 37.9 (L) 39.0 - 53.0 %    MCV 87.1 80.0 - 100.0 fL    MCH 28.7 26.0 - 34.0 pg    MCHC 33.0 31.0 - 37.0 g/dL    RDW-SD 45.6 35.1 - 46.3 fL    RDW 14.1 11.0 - 15.0 %    .0 150.0 - 450.0 10(3)uL    Neutrophil Absolute Prelim 4.66 1.50 - 7.70 x10 (3) uL    Neutrophil Absolute 4.66 1.50 - 7.70 x10(3) uL    Lymphocyte Absolute 0.95 (L) 1.00 - 4.00 x10(3) uL    Monocyte Absolute 0.51 0.10 - 1.00 x10(3) uL    Eosinophil Absolute 0.22 0.00 - 0.70 x10(3) uL    Basophil Absolute 0.04 0.00 - 0.20 x10(3) uL    Immature Granulocyte Absolute 0.02 0.00 - 1.00 x10(3) uL    Neutrophil % 72.9 %    Lymphocyte % 14.8 %    Monocyte % 8.0 %    Eosinophil % 3.4 %    Basophil % 0.6 %    Immature Granulocyte % 0.3 %     *Note: Due to a large number of results and/or encounters for the requested time  period, some results have not been displayed. A complete set of results can be found in Results Review.       Imaging:  Imaging:  Duplex ultrasound 1/20/2024    Impression   CONCLUSION:  1. On the right, there is no hemodynamically significant aortoiliac inflow disease.  There is focal occlusion of the mid superficial femoral artery.  There is single-vessel runoff via the anterior tibial artery.  The peroneal artery reconstitutes  distally.  2. On the left, there is greater than 70 percent aortoiliac inflow disease.  The main infrapopliteal runoff vessels are all occluded..              Right foot x-ray 1/28/2024    Impression   CONCLUSION:  1. Macro right foot.  No destructive/erosive osseous changes are seen to suggest osteomyelitis radiographically.  2. Lesser incidental findings as above.         CTA lower extremities 2/6/2024  Narrative   PROCEDURE: CTA LOWER EXTREMITY BILAT (CPT=73706)     COMPARISON: None.     INDICATIONS: PAD (peripheral artery disease).     TECHNIQUE: CT images were obtained without and with non-ionic intravenous contrast material. Multi-planar reformatted and 3-D images were created with vessel analysis performed on an independent workstation.  Automated exposure control for dose reduction   was used. Adjustment of the mA and/or kV was done based on the patient's size. Use of iterative reconstruction technique for dose reduction was used.  Dose information is transmitted to the ACR (American College of Radiology) NRDR (National Radiology  Data Registry) which includes the Dose Index Registry.     FINDINGS:     RIGHT LEG    AORTO-ILIAC: There is severe atherosclerotic calcification of the common iliac artery extending into the right internal and external iliac arteries. Moderate grade stenosis of the internal and external iliac arteries without occlusion.  FEMORAL ARTERY:   Moderate atherosclerotic calcification and noncalcified atheromatous plaques throughout the common femoral artery as  well as within the superficial femoral artery throughout its entire course. High-grade stenosis and narrowing  involving the proximal superficial femoral artery extending into the mid and distal aspects of the SFA.  There is complete occlusion seen within the mid superficial femoral artery seen on series 5, image 115 extending into the distal superficial femoral  artery at the level of the adductor canal.  There is retrograde filling from intramuscular branches.  The total area of occlusion measures approximately 6.3 cm.  No aneurysm.  The profundus femoris artery is patent.    POPLITEAL ARTERY:   Moderate grade atherosclerotic calcification and noncalcified atheromatous plaque seen throughout the origin of the popliteal artery. No focal occlusion or aneurysmal dilation.  RUN-OFF ARTERIES:   Severe atherosclerotic calcification seen within the distal popliteal artery extending into the tibioperoneal trunk.  There is extensive atherosclerotic calcification seen throughout the posterior and anterior tibial arteries as well   as peroneal arteries which are markedly diminutive.  There is nondiagnostic evaluation of the calf vessels due to extensive atherosclerotic calcification.     LEFT LEG    AORTO-ILIAC: There is severe atherosclerotic calcification of the common iliac artery extending into the left internal and external iliac arteries. Moderate grade stenosis of the internal and external iliac arteries without occlusion.  FEMORAL ARTERY:   Moderate atherosclerotic calcification and noncalcified atheromatous plaques throughout the common femoral artery as well as within the superficial femoral artery throughout its entire course. High-grade stenosis and narrowing  involving the proximal superficial femoral artery extending into the mid and distal aspects of the SFA.  No complete occlusion.    POPLITEAL ARTERY:   Moderate grade atherosclerotic calcification and noncalcified atheromatous plaque seen throughout the  origin of the popliteal artery. No focal occlusion or aneurysmal dilation.  RUN-OFF ARTERIES: Severe atherosclerotic calcification seen within the distal popliteal artery extending into the tibioperoneal trunk.  There is extensive atherosclerotic calcification seen throughout the posterior and anterior tibial arteries as well  as peroneal arteries which are markedly diminutive.  There is nondiagnostic evaluation of the calf vessels due to extensive atherosclerotic calcification.     OTHER FINDINGS: There are bilateral inguinal hernias containing fat. No bowel herniation. Bladder wall thickening greater than what is expected for under distention, likely due to chronic bladder outlet obstruction from an enlarged prostate. No mass or  fluid collection.  No enlarged lymph nodes. Degenerative disc and facet disease is partially visualized in the lower lumbar spine. Degenerative changes are seen within the sacroiliac joints and pubic symphysis. Degenerative changes are seen in the  bilateral hips.  Degenerative changes are seen within the bilateral knees, ankle and feet.               Impression   CONCLUSION:     Extensive atherosclerotic calcification seen within the aortobiiliac system and throughout the bilateral lower extremities.     Approximately 6.3 cm area of complete occlusion involving the mid to distal right superficial femoral artery with retrograde filling.     Essentially nondiagnostic evaluation of the bilateral calf vessels due to extensive atherosclerotic calcification and diminutive nature.     Multifocal areas of high-grade stenosis within the left superficial femoral artery without complete occlusion.     Multiple other incidental findings as described in the body of the report.      X-ray right foot 3/29/2024        Impression   CONCLUSION:     Small cutaneous ulcer over the lateral aspect of the fifth digit, without radiographic evidence of acute osteomyelitis.  MRI of the forefoot is more sensitive  for the detection of acute osteomyelitis.     Moderate first metatarsophalangeal joint osteoarthritis.         ASSESSMENT AND PLAN:     Lower extremity edema  I suspect this could be as result of reperfusion in the lower extremities, it is possible that improvement of blood flow is  leading to edematous extravasation, most apparent with extremity wounds screening subcutaneous fluid, noninfectious in origin.  - Likely multifactorial in setting of cardiogenic edema with elevated BNP, some degree of chronic kidney disease  - Would benefit from echocardiogram evaluation if symptoms do not improve  -Patient has been using Lasix from  potentially .  Refill and will resume Lasix 40 mg once a day.  Will check in with us through [facility] or phone call in 3 days on Monday 6/3.  If still edematous, may need to increase dosage further  - Will monitor blood pressures and lites    Elevate legs during times of rest  Use of compression stockings can help push fluid back into the veins  Slight decrease in salt intake to avoid excessive water buildup  If excessive leg swelling continues, we may need to escalate the medications carefully while monitoring kidney function closely.      Chronic nonhealing wound  Peripheral arterial disease  Ongoing over the course of months, initially patient declined any sort of invasive management  -Ultrasound duplex 2024: Focal occlusion of the mid superficial femoral artery with single-vessel runoff via anterior tibial artery and peroneal artery.  On the left there is greater than 70% aortoiliac inflow disease, main infrapopliteal runoff vessels are all occluded  - Status post right lower extremity angioplasty and stenting 2024 with Dr. Greenfield.  Noted to have chronic long segment SFA occlusion and distal tibial on the right lower extremity.  -Recently evaluated by podiatry, Dr. George last seen 3/29/2024: Status postdebridement, orthotics with inserts  - Status post right lower  extremity arteriogram, superficial femoral, posterior tibial and anterior tibial angioplasty 5/22/2024.  Was continued on aspirin and Plavix, holding Plavix for 48 hours due to some bright red blood per rectum.  Hemoglobin has remained stable     CAD  Status post CABG  - Would benefit from use of aspirin  - On metoprolol 100 mg twice a day     Paroxysmal A-fib  Status post Maze procedure.  - On metoprolol 100 mg twice a day  - Declining use of anticoagulation.    - Pacemaker in place     Type 2 diabetes:   Recent A1c:   HEMOGLOBIN A1C (%)   Date Value   10/18/2023 6.8 (A)     HgbA1C (%)   Date Value   04/04/2024 7.8 (H)     Recent urine microalbumin: No components found for: \"URINEMICROALBUMIN\"  Current medications: Glipizide 10 mg daily  Eye exam: Denies any referrals for ophthalmology  Foot exam: Follows with podiatry, Dr. George  -We will increase Accu-Cheks at home, may need refill on test strips     Hypertension  -Blood pressure today at goal  - Check blood pressures at home  - Continue with home metoprolol, lisinopril 20 mg daily, spironolactone 25 mg daily     Hyperlipidemia  - Last lipid panel with , total cholesterol 178  - Not currently on statin     History of colon cancer  Status post hemicolectomy 2019.  - No new recurrences, patient declining any further colonoscopies  - CEA was undetectable     Peripheral neuropathy  - Can be contributing to lower extremity symptoms.  - Trial Lyrica 50 mg twice a day - not currently taking  -Vitamin B12 level at goal  - will try to supplement with vitamin B12, cyanocobalamin 1000 mcg once a day.     Spinal stenosis of lumbar region  Chronic intermittent low back pain  -Would recommend initial trial of conservative therapy:     -Acetaminophen 500-650 mg every 4-6 hours as needed for pain relief  -Ibuprofen 200-400 mg every 8 hours as needed for anti-inflammatory and pain relief  -For more severe pain, on Norco as needed  -Trial of physical therapy can be  considered if home exercise regimen insufficient  - This has remained stable       Orders This Visit:  Orders Placed This Encounter   Procedures    CBC W Differential W Platelet [E]    Comp Metabolic Panel (14) [E]       Meds This Visit:  Requested Prescriptions     Signed Prescriptions Disp Refills    furosemide 40 MG Oral Tab 90 tablet 1     Sig: Take 1 tablet (40 mg total) by mouth daily.       Imaging & Referrals:  CARD ECHO 2D DOPPLER (CPT=93306)     Health Maintenance  Return to clinic in 3 months as scheduled    Spent 45 minutes obtaining history, evaluating patient, discussing treatment options, diet, exercise, review of available labs and radiology reports, and completing documentation.     Aaron Helton MD, 05/30/24, 8:47 PM

## 2024-05-31 ENCOUNTER — TELEPHONE (OUTPATIENT)
Dept: INTERNAL MEDICINE CLINIC | Facility: CLINIC | Age: 74
End: 2024-05-31

## 2024-05-31 RX ORDER — LISINOPRIL 20 MG/1
20 TABLET ORAL 2 TIMES DAILY
Qty: 180 TABLET | Refills: 3 | Status: SHIPPED | OUTPATIENT
Start: 2024-05-31

## 2024-05-31 NOTE — TELEPHONE ENCOUNTER
Cristopher Helton, this RX was previously handled by Dr. Cole, pt seen yesterday by you - the note mentions lisinopril 20mg daily - can you kindly confirm the dose, this would be original RX from you as well - thank you

## 2024-06-03 ENCOUNTER — TELEPHONE (OUTPATIENT)
Dept: INTERNAL MEDICINE CLINIC | Facility: CLINIC | Age: 74
End: 2024-06-03

## 2024-06-03 RX ORDER — METOPROLOL SUCCINATE 100 MG/1
100 TABLET, EXTENDED RELEASE ORAL 2 TIMES DAILY
Qty: 180 TABLET | Refills: 3 | Status: SHIPPED | OUTPATIENT
Start: 2024-06-03

## 2024-06-03 NOTE — TELEPHONE ENCOUNTER
REFILL PASSED PER [facility]    Requested Prescriptions   Pending Prescriptions Disp Refills    METOPROLOL SUCCINATE  MG Oral Tablet 24 Hr [Pharmacy Med Name: METOPROLOL ER SUCCINATE 100MG TABS] 180 tablet 1     Sig: TAKE 1 TABLET(100 MG) BY MOUTH TWICE DAILY       Hypertension Medications Protocol Passed - 5/31/2024  5:51 AM        Passed - CMP or BMP in past 12 months        Passed - Last BP reading less than 140/90     BP Readings from Last 1 Encounters:   05/30/24 110/70               Passed - In person appointment or virtual visit in the past 12 mos or appointment in next 3 mos     Recent Outpatient Visits              4 days ago Edema due to congestive heart failure (HCC)    [facility], [address], [place] Aaron Helton MD    Office Visit    1 month ago Annual physical exam    [facility], [address], Aaron Cisse MD    Office Visit    2 months ago Diabetic ulcer of other part of right foot associated with type 2 diabetes mellitus, with fat layer exposed ([facility])    [facility] [place], Nelson Miguelito, DPM    Office Visit    2 months ago Diabetic ulcer of other part of right foot associated with type 2 diabetes mellitus, with fat layer exposed (HCC)    [facility] [place], Nelson Miguelito, DPM    Office Visit    2 months ago Diabetic ulcer of other part of right foot associated with type 2 diabetes mellitus, with fat layer exposed (HCC)    [facility], [address], [place], Nelson Miguelito, DPM    Office Visit          Future Appointments         Provider Department Appt Notes    In 2 months Aaron Helton MD [facility] 4 Month per Dr. Helton                    Passed - EGFRCR or GFRNAA > 50     GFR Evaluation  EGFRCR: 53 , resulted on 5/29/2024               Future Appointments         Provider Department Appt Notes     In 2 months Aaron Helton MD [facility] 4 Month per Dr. Helton          Recent Outpatient Visits              4 days ago Edema due to congestive heart failure (HCC)    [facility] [place] Aaron Helton MD    Office Visit    1 month ago Annual physical exam    [facility], [address], [place] Aaron Helton MD    Office Visit    2 months ago Diabetic ulcer of other part of right foot associated with type 2 diabetes mellitus, with fat layer exposed ([facility])    [facility], Nelson Miguelito, DPM    Office Visit    2 months ago Diabetic ulcer of other part of right foot associated with type 2 diabetes mellitus, with fat layer exposed ([facility])    [facility] [place], Nelson Miguelito, DPM    Office Visit    2 months ago Diabetic ulcer of other part of right foot associated with type 2 diabetes mellitus, with fat layer exposed ([facility])    [facility], [address], [place], Nelson Miguelito, DPM    Office Visit

## 2024-06-03 NOTE — TELEPHONE ENCOUNTER
Patient's wife Isabell is calling with a condition update patient is doing much better his weight is down 5 lbs since visit on 5/30, his wounds are healing nicely

## 2024-06-03 NOTE — TELEPHONE ENCOUNTER
Very happy to hear, lets continue with the same furosemide dosage for an additional 3 days.  If the weight continues to remain low and the leg swelling goes down, can discontinue the furosemide and use as needed after his last dose on Wednesday 6/5 (today would be Day #1).

## 2024-06-07 ENCOUNTER — LAB ENCOUNTER (OUTPATIENT)
Dept: LAB | Facility: HOSPITAL | Age: 74
End: 2024-06-07
Attending: INTERNAL MEDICINE
Payer: MEDICARE

## 2024-06-07 ENCOUNTER — TELEPHONE (OUTPATIENT)
Dept: INTERNAL MEDICINE CLINIC | Facility: CLINIC | Age: 74
End: 2024-06-07

## 2024-06-07 DIAGNOSIS — I50.9 EDEMA DUE TO CONGESTIVE HEART FAILURE (HCC): ICD-10-CM

## 2024-06-07 DIAGNOSIS — K62.5 RECTAL BLEEDING: ICD-10-CM

## 2024-06-07 LAB
ALBUMIN SERPL-MCNC: 4.9 G/DL (ref 3.2–4.8)
ALBUMIN/GLOB SERPL: 2 {RATIO} (ref 1–2)
ALP LIVER SERPL-CCNC: 73 U/L
ALT SERPL-CCNC: 16 U/L
ANION GAP SERPL CALC-SCNC: 9 MMOL/L (ref 0–18)
AST SERPL-CCNC: 17 U/L (ref ?–34)
BASOPHILS # BLD AUTO: 0.06 X10(3) UL (ref 0–0.2)
BASOPHILS NFR BLD AUTO: 0.8 %
BILIRUB SERPL-MCNC: 0.8 MG/DL (ref 0.2–1.1)
BUN BLD-MCNC: 41 MG/DL (ref 9–23)
BUN/CREAT SERPL: 22.3 (ref 10–20)
CALCIUM BLD-MCNC: 10 MG/DL (ref 8.7–10.4)
CHLORIDE SERPL-SCNC: 110 MMOL/L (ref 98–112)
CO2 SERPL-SCNC: 21 MMOL/L (ref 21–32)
CREAT BLD-MCNC: 1.84 MG/DL
DEPRECATED RDW RBC AUTO: 46.1 FL (ref 35.1–46.3)
EGFRCR SERPLBLD CKD-EPI 2021: 39 ML/MIN/1.73M2 (ref 60–?)
EOSINOPHIL # BLD AUTO: 0.23 X10(3) UL (ref 0–0.7)
EOSINOPHIL NFR BLD AUTO: 3.2 %
ERYTHROCYTE [DISTWIDTH] IN BLOOD BY AUTOMATED COUNT: 14.3 % (ref 11–15)
FASTING STATUS PATIENT QL REPORTED: YES
GLOBULIN PLAS-MCNC: 2.5 G/DL (ref 2–3.5)
GLUCOSE BLD-MCNC: 163 MG/DL (ref 70–99)
HCT VFR BLD AUTO: 38.6 %
HGB BLD-MCNC: 13 G/DL
IMM GRANULOCYTES # BLD AUTO: 0.02 X10(3) UL (ref 0–1)
IMM GRANULOCYTES NFR BLD: 0.3 %
LYMPHOCYTES # BLD AUTO: 0.77 X10(3) UL (ref 1–4)
LYMPHOCYTES NFR BLD AUTO: 10.8 %
MCH RBC QN AUTO: 29.6 PG (ref 26–34)
MCHC RBC AUTO-ENTMCNC: 33.7 G/DL (ref 31–37)
MCV RBC AUTO: 87.9 FL
MONOCYTES # BLD AUTO: 0.77 X10(3) UL (ref 0.1–1)
MONOCYTES NFR BLD AUTO: 10.8 %
NEUTROPHILS # BLD AUTO: 5.25 X10 (3) UL (ref 1.5–7.7)
NEUTROPHILS # BLD AUTO: 5.25 X10(3) UL (ref 1.5–7.7)
NEUTROPHILS NFR BLD AUTO: 74.1 %
OSMOLALITY SERPL CALC.SUM OF ELEC: 304 MOSM/KG (ref 275–295)
PLATELET # BLD AUTO: 186 10(3)UL (ref 150–450)
POTASSIUM SERPL-SCNC: 5.6 MMOL/L (ref 3.5–5.1)
PROT SERPL-MCNC: 7.4 G/DL (ref 5.7–8.2)
RBC # BLD AUTO: 4.39 X10(6)UL
SODIUM SERPL-SCNC: 140 MMOL/L (ref 136–145)
WBC # BLD AUTO: 7.1 X10(3) UL (ref 4–11)

## 2024-06-07 PROCEDURE — 85025 COMPLETE CBC W/AUTO DIFF WBC: CPT

## 2024-06-07 PROCEDURE — 80053 COMPREHEN METABOLIC PANEL: CPT

## 2024-06-07 PROCEDURE — 36415 COLL VENOUS BLD VENIPUNCTURE: CPT

## 2024-06-07 NOTE — TELEPHONE ENCOUNTER
Has a mild JAZMINE where he may be dry at this point with the Lasix.  He did stop it 2 days ago as he is able to see the blood vessels and ankles that were not visible on our examination.  His weight was 176.  Drinking plenty of water and recall that his weight is down from 183-185.  Okay to take off the Lasix and use on as-needed basis.  He should call us if there is increase in weight gain and leg swelling, we should then do short courses of Lasix as needed.  Otherwise he is maintaining water intake to address the JAZMINE and subsequent hyperkalemia.

## 2024-06-10 RX ORDER — CLOPIDOGREL BISULFATE 75 MG/1
75 TABLET ORAL DAILY
Qty: 90 TABLET | Refills: 0 | Status: SHIPPED | OUTPATIENT
Start: 2024-06-10

## 2024-09-03 ENCOUNTER — HOSPITAL ENCOUNTER (OUTPATIENT)
Dept: ULTRASOUND IMAGING | Facility: HOSPITAL | Age: 74
Discharge: HOME | End: 2024-09-03
Attending: RADIOLOGY
Payer: MEDICARE

## 2024-09-03 DIAGNOSIS — I73.9 PAD (PERIPHERAL ARTERY DISEASE) (HCC): ICD-10-CM

## 2024-09-03 PROCEDURE — 93926 LOWER EXTREMITY STUDY: CPT | Performed by: RADIOLOGY

## 2024-09-08 ENCOUNTER — TELEPHONE (OUTPATIENT)
Dept: INTERNAL MEDICINE CLINIC | Facility: CLINIC | Age: 74
End: 2024-09-08

## 2024-09-08 NOTE — TELEPHONE ENCOUNTER
Received outside hospital records from interventional radiology, Dr. Orozco  9/6/2024  - Status post angioplasty of in-stent restenosis of right superficial femoral artery, healing fifth toe ulceration.  No need for repeat intervention.  Patient elected to hold off on further angiography.  Follow-up in 3 months with repeat ultrasound

## 2024-09-09 RX ORDER — GLIPIZIDE 10 MG/1
TABLET, FILM COATED, EXTENDED RELEASE ORAL
Qty: 90 TABLET | Refills: 3 | OUTPATIENT
Start: 2024-09-09

## 2024-09-09 RX ORDER — GLIPIZIDE 10 MG/1
TABLET, FILM COATED, EXTENDED RELEASE ORAL
Qty: 90 TABLET | Refills: 3 | Status: SHIPPED | OUTPATIENT
Start: 2024-09-09

## 2024-09-09 NOTE — TELEPHONE ENCOUNTER
Refill request is for a maintenance medication and has met the criteria specified in the Ambulatory Medication Refill Standing Order for eligibility, visits, laboratory, alerts and was sent to the requested pharmacy.    Requested Prescriptions     Signed Prescriptions Disp Refills    GLIPIZIDE ER 10 MG Oral Tablet 24 Hr 90 tablet 3     Sig: TAKE 1 TABLET(10 MG) BY MOUTH DAILY     Authorizing Provider: NISHI ROBERTS     Ordering User: SOLIS EMANUEL

## 2024-09-09 NOTE — TELEPHONE ENCOUNTER
Current refill request refused due to refill is either a duplicate request or has active refills at the pharmacy.  Check previous templates.    Requested Prescriptions     Refused Prescriptions Disp Refills    GLIPIZIDE ER 10 MG Oral Tablet 24 Hr [Pharmacy Med Name: GLIPIZIDE ER 10MG TABLETS] 90 tablet 3     Sig: TAKE 1 TABLET(10 MG) BY MOUTH DAILY     Refused By: SOLIS EMANUEL     Reason for Refusal: Duplicate refill request

## 2024-09-22 ENCOUNTER — TELEPHONE (OUTPATIENT)
Dept: INTERNAL MEDICINE CLINIC | Facility: CLINIC | Age: 74
End: 2024-09-22

## 2024-09-24 RX ORDER — SPIRONOLACTONE 25 MG/1
TABLET ORAL
Qty: 90 TABLET | Refills: 3 | Status: SHIPPED | OUTPATIENT
Start: 2024-09-24

## 2024-09-29 NOTE — PROGRESS NOTES
Chief Complaint:   Chief Complaint   Patient presents with    Follow - Up     Pt here for general f/u      HPI:     Mr. REDD is a 73 year old male PMHX coronary artery disease status post CABG, A-fib, type 2 diabetes, hypertension, hyperlipidemia, peripheral arterial disease, history of malignant neoplasm of colon, peripheral neuropathy who presents today for follow-up    Overall doing well. No leg pain. The wounds have healed. Will get another of US legs in December. Has appointment with Dr. Greenfield in the next 3 months as well. Walking and exercise has been minimal. Hips and joints hurt at times. Uses the pedaler every morning. He does get some swelling in the  L leg. No recent falls.    Numbness and tingling are better. Uses the furosemide sparingly only for 3 days. Has only needed 3 episodes of it.    Still some intermittent leg swelling.  But overall much better from the springtime.    Past Medical History:    Atherosclerosis of coronary artery    CAD (coronary artery disease)    Cancer (HCC)    Colon cancer (HCC)    COVID-19 virus infection    Oct 2020    Diabetes (HCC)    DM2 (diabetes mellitus, type 2) (HCC)    Essential hypertension    HTN (hypertension)    Hyperlipidemia    Other abnormal glucose    rubber band hemorroid    Paroxysmal atrial fibrillation (HCC)     Past Surgical History:   Procedure Laterality Date    Other      MAZE    Other      CABAG    Other      appendectomy    Other      pacer    Other       Status post hemicolectomy, July 2019     Social History:  Social History     Socioeconomic History    Marital status:    Tobacco Use    Smoking status: Former    Smokeless tobacco: Never   Vaping Use    Vaping status: Every Day    Substances: Nicotine    Devices: Refillable tank   Substance and Sexual Activity    Alcohol use: No     Alcohol/week: 0.0 standard drinks of alcohol    Drug use: No     Family History:  No family history on file.  Allergies:  No Known Allergies  Current  Meds:  Current Outpatient Medications   Medication Sig Dispense Refill    SPIRONOLACTONE 25 MG Oral Tab TAKE 1 TABLET(25 MG) BY MOUTH DAILY 90 tablet 3    glipiZIDE ER 10 MG Oral Tablet 24 Hr Take 1 tablet (10 mg total) by mouth daily. 90 tablet 3    metoprolol succinate  MG Oral Tablet 24 Hr Take 1 tablet (100 mg total) by mouth 2 (two) times daily. 180 tablet 3    lisinopril 20 MG Oral Tab Take 1 tablet (20 mg total) by mouth 2 (two) times daily. 180 tablet 3    HYDROcodone-acetaminophen 5-325 MG Oral Tab Take 1 tablet by mouth every 8 (eight) hours as needed for Pain.      furosemide 40 MG Oral Tab Take 1 tablet (40 mg total) by mouth as needed.      acetaminophen 325 MG Oral Tab Take 1 tablet (325 mg total) by mouth every 6 (six) hours as needed for Pain.      ALPRAZolam 0.5 MG Oral Tab Take 1 tablet (0.5 mg total) by mouth 2 (two) times daily as needed for Anxiety or Sleep. 60 tablet 5    aspirin 81 MG Oral Tab EC Take 1 tablet (81 mg total) by mouth daily. 90 tablet 3    Glucose Blood (ONETOUCH ULTRA BLUE) In Vitro Strip Check BS once daily1 100 strip 3    ONETOUCH DELICA LANCETS 33G Does not apply Misc Test BS once daily 100 each 3      Counseling given: Not Answered       REVIEW OF SYSTEMS:   Positive Findings indicated in BOLD    Constitutional: Fever, Chills, Weight Gain, Weight Loss, Night Sweats, Fatigue, Malaise  ENT/Mouth:  Hearing Changes, Ear Pain, Nasal Congestion, Sinus Pain, Hoarseness, Sore throat, Rhinorrhea, Swallowing Difficulty  Eyes: Eye Pain, Swelling, Redness, Foreign Body, Discharge, Vision Changes  Cardiovascular: Chest Pain, SOB, PND, Dyspnea on Exertion, Orthopnea, Claudication, Edema, Palpitations  Respiratory: Cough, Sputum, Wheezing, Shortness of breath  Gastrointestinal: Nausea, Vomiting, Diarrhea, Constipation, Pain, Heartburn, Dysphagia, Bloody stools, Tarry stools  Genitourinary: Dysmenorrhea, Dysuria, Urinary Frequency, Hematuria, Urinary Incontinence, Urgency,  Flank  Pain  Musculoskeletal: Arthralgias, Myalgias, Joint Swelling, Joint Stiffness, Back Pain, Neck Pain  Integumentary: Skin Lesions, Pruritis, Hair Changes, Jaundice, Nail changes  Neuro: Weakness, Numbness, Paresthesias, Loss of Consciousness, Syncope, Dizziness, Headache, Falls  Psych: Anxiety, Depression, Insomnia, Suicidal Ideation, Homicidal ideation, Memory Changes  Heme/Lymph: Bruising, Bleeding, Lymphadenopathy  Endocrine: Polyuria, Polydipsia, Temperature Intolerance    EXAM:   Vital Signs:  Blood pressure 126/78, pulse 88, temperature 97.9 °F (36.6 °C), height 5' 6.5\" (1.689 m), weight 181 lb (82.1 kg), SpO2 99%.     Constitutional: No acute distress. Alert and oriented x 3.  Eyes: EOMI, PERRLA, clear sclera b/l  HENT: NCAT, Moist mucous membranes, Oropharynx without erythema or exudates  Neck: No JVD, no thyromegaly  Cardiovascular: S1, S2, no S3, no S4, Regular rate and rhythm, No murmurs/gallops/rubs.   Vascular: Equal pulses 1+ lower extremity pulses  Respiratory: Clear to auscultation bilaterally.  No wheezes/rales/rhonchi  Gastrointestinal: Soft, nontender, nondistended. Positive bowel sounds x 4. No rebound tenderness. No hepatomegaly, No splenomegaly  Genitourinary: No CVA tenderness bilaterally  Neurologic: No focal neurological deficits, CN II-XII intact, light touch intact, MSK Strength 5/5 and symmetric in all extremities, normal gait, 2+ patellar tendon  Musculoskeletal: Full range of motion of all extremities, trace edema bilaterally  Skin: No suspicious skin lesions  Psychiatric: Appropriate mood and affect  Heme/Lymph/Immune: No cervical LAD      DATA REVIEWED   Labs:  Recent Results (from the past 8760 hour(s))   Comp Metabolic Panel (14) [E]    Collection Time: 06/07/24 12:39 PM   Result Value Ref Range    Glucose 163 (H) 70 - 99 mg/dL    Sodium 140 136 - 145 mmol/L    Potassium 5.6 (H) 3.5 - 5.1 mmol/L    Chloride 110 98 - 112 mmol/L    CO2 21.0 21.0 - 32.0 mmol/L    Anion Gap 9 0 - 18  mmol/L    BUN 41 (H) 9 - 23 mg/dL    Creatinine 1.84 (H) 0.70 - 1.30 mg/dL    BUN/CREA Ratio 22.3 (H) 10.0 - 20.0    Calcium, Total 10.0 8.7 - 10.4 mg/dL    Calculated Osmolality 304 (H) 275 - 295 mOsm/kg    eGFR-Cr 39 (L) >=60 mL/min/1.73m2    ALT 16 10 - 49 U/L    AST 17 <=34 U/L    Alkaline Phosphatase 73 45 - 117 U/L    Bilirubin, Total 0.8 0.2 - 1.1 mg/dL    Total Protein 7.4 5.7 - 8.2 g/dL    Albumin 4.9 (H) 3.2 - 4.8 g/dL    Globulin  2.5 2.0 - 3.5 g/dL    A/G Ratio 2.0 1.0 - 2.0    Patient Fasting for CMP? Yes      *Note: Due to a large number of results and/or encounters for the requested time period, some results have not been displayed. A complete set of results can be found in Results Review.       Recent Results (from the past 8760 hour(s))   CBC W/ DIFFERENTIAL    Collection Time: 06/07/24 12:39 PM   Result Value Ref Range    WBC 7.1 4.0 - 11.0 x10(3) uL    RBC 4.39 3.80 - 5.80 x10(6)uL    HGB 13.0 13.0 - 17.5 g/dL    HCT 38.6 (L) 39.0 - 53.0 %    MCV 87.9 80.0 - 100.0 fL    MCH 29.6 26.0 - 34.0 pg    MCHC 33.7 31.0 - 37.0 g/dL    RDW-SD 46.1 35.1 - 46.3 fL    RDW 14.3 11.0 - 15.0 %    .0 150.0 - 450.0 10(3)uL    Neutrophil Absolute Prelim 5.25 1.50 - 7.70 x10 (3) uL    Neutrophil Absolute 5.25 1.50 - 7.70 x10(3) uL    Lymphocyte Absolute 0.77 (L) 1.00 - 4.00 x10(3) uL    Monocyte Absolute 0.77 0.10 - 1.00 x10(3) uL    Eosinophil Absolute 0.23 0.00 - 0.70 x10(3) uL    Basophil Absolute 0.06 0.00 - 0.20 x10(3) uL    Immature Granulocyte Absolute 0.02 0.00 - 1.00 x10(3) uL    Neutrophil % 74.1 %    Lymphocyte % 10.8 %    Monocyte % 10.8 %    Eosinophil % 3.2 %    Basophil % 0.8 %    Immature Granulocyte % 0.3 %     *Note: Due to a large number of results and/or encounters for the requested time period, some results have not been displayed. A complete set of results can be found in Results Review.       Imaging:  Imaging:  Duplex ultrasound 1/20/2024    Impression   CONCLUSION:  1. On the right,  there is no hemodynamically significant aortoiliac inflow disease.  There is focal occlusion of the mid superficial femoral artery.  There is single-vessel runoff via the anterior tibial artery.  The peroneal artery reconstitutes  distally.  2. On the left, there is greater than 70 percent aortoiliac inflow disease.  The main infrapopliteal runoff vessels are all occluded..              Right foot x-ray 1/28/2024    Impression   CONCLUSION:  1. Macro right foot.  No destructive/erosive osseous changes are seen to suggest osteomyelitis radiographically.  2. Lesser incidental findings as above.         CTA lower extremities 2/6/2024  Narrative   PROCEDURE: CTA LOWER EXTREMITY BILAT (CPT=73706)     COMPARISON: None.     INDICATIONS: PAD (peripheral artery disease).     TECHNIQUE: CT images were obtained without and with non-ionic intravenous contrast material. Multi-planar reformatted and 3-D images were created with vessel analysis performed on an independent workstation.  Automated exposure control for dose reduction   was used. Adjustment of the mA and/or kV was done based on the patient's size. Use of iterative reconstruction technique for dose reduction was used.  Dose information is transmitted to the ACR (American College of Radiology) NRDR (National Radiology  Data Registry) which includes the Dose Index Registry.     FINDINGS:     RIGHT LEG    AORTO-ILIAC: There is severe atherosclerotic calcification of the common iliac artery extending into the right internal and external iliac arteries. Moderate grade stenosis of the internal and external iliac arteries without occlusion.  FEMORAL ARTERY:   Moderate atherosclerotic calcification and noncalcified atheromatous plaques throughout the common femoral artery as well as within the superficial femoral artery throughout its entire course. High-grade stenosis and narrowing  involving the proximal superficial femoral artery extending into the mid and distal aspects of  the SFA.  There is complete occlusion seen within the mid superficial femoral artery seen on series 5, image 115 extending into the distal superficial femoral  artery at the level of the adductor canal.  There is retrograde filling from intramuscular branches.  The total area of occlusion measures approximately 6.3 cm.  No aneurysm.  The profundus femoris artery is patent.    POPLITEAL ARTERY:   Moderate grade atherosclerotic calcification and noncalcified atheromatous plaque seen throughout the origin of the popliteal artery. No focal occlusion or aneurysmal dilation.  RUN-OFF ARTERIES:   Severe atherosclerotic calcification seen within the distal popliteal artery extending into the tibioperoneal trunk.  There is extensive atherosclerotic calcification seen throughout the posterior and anterior tibial arteries as well   as peroneal arteries which are markedly diminutive.  There is nondiagnostic evaluation of the calf vessels due to extensive atherosclerotic calcification.     LEFT LEG    AORTO-ILIAC: There is severe atherosclerotic calcification of the common iliac artery extending into the left internal and external iliac arteries. Moderate grade stenosis of the internal and external iliac arteries without occlusion.  FEMORAL ARTERY:   Moderate atherosclerotic calcification and noncalcified atheromatous plaques throughout the common femoral artery as well as within the superficial femoral artery throughout its entire course. High-grade stenosis and narrowing  involving the proximal superficial femoral artery extending into the mid and distal aspects of the SFA.  No complete occlusion.    POPLITEAL ARTERY:   Moderate grade atherosclerotic calcification and noncalcified atheromatous plaque seen throughout the origin of the popliteal artery. No focal occlusion or aneurysmal dilation.  RUN-OFF ARTERIES: Severe atherosclerotic calcification seen within the distal popliteal artery extending into the tibioperoneal trunk.   There is extensive atherosclerotic calcification seen throughout the posterior and anterior tibial arteries as well  as peroneal arteries which are markedly diminutive.  There is nondiagnostic evaluation of the calf vessels due to extensive atherosclerotic calcification.     OTHER FINDINGS: There are bilateral inguinal hernias containing fat. No bowel herniation. Bladder wall thickening greater than what is expected for under distention, likely due to chronic bladder outlet obstruction from an enlarged prostate. No mass or  fluid collection.  No enlarged lymph nodes. Degenerative disc and facet disease is partially visualized in the lower lumbar spine. Degenerative changes are seen within the sacroiliac joints and pubic symphysis. Degenerative changes are seen in the  bilateral hips.  Degenerative changes are seen within the bilateral knees, ankle and feet.               Impression   CONCLUSION:     Extensive atherosclerotic calcification seen within the aortobiiliac system and throughout the bilateral lower extremities.     Approximately 6.3 cm area of complete occlusion involving the mid to distal right superficial femoral artery with retrograde filling.     Essentially nondiagnostic evaluation of the bilateral calf vessels due to extensive atherosclerotic calcification and diminutive nature.     Multifocal areas of high-grade stenosis within the left superficial femoral artery without complete occlusion.     Multiple other incidental findings as described in the body of the report.      X-ray right foot 3/29/2024        Impression   CONCLUSION:     Small cutaneous ulcer over the lateral aspect of the fifth digit, without radiographic evidence of acute osteomyelitis.  MRI of the forefoot is more sensitive for the detection of acute osteomyelitis.     Moderate first metatarsophalangeal joint osteoarthritis.     Duplex lower extremity 9/3/2024    Impression   CONCLUSION:  There is recurrent in stent restenosis  within the previously placed superficial femoral artery stent.  There is a recurrent posterior tibial artery occlusion.  There is 2 vessel distal runoff via anterior tibial and peroneal arteries, though   with dampened monophasic flow due to the more proximal occlusions.       ASSESSMENT AND PLAN:         Chronic nonhealing wound  Peripheral arterial disease  Ongoing over the course of months, initially patient declined any sort of invasive management  -Ultrasound duplex 1/24/2024: Focal occlusion of the mid superficial femoral artery with single-vessel runoff via anterior tibial artery and peroneal artery.  On the left there is greater than 70% aortoiliac inflow disease, main infrapopliteal runoff vessels are all occluded  - Status post right lower extremity angioplasty and stenting 2/7/2024 with Dr. Greenfield.  Noted to have chronic long segment SFA occlusion and distal tibial on the right lower extremity.  -Recently evaluated by podiatry, Dr. George last seen 3/29/2024: Status postdebridement, orthotics with inserts  - Status post right lower extremity arteriogram, superficial femoral, posterior tibial and anterior tibial angioplasty 5/22/2024.    -Duplex lower extremity 9/3/2020 for recurrent in-stent restenosis within SFA history, recurrent posterior tibial artery occlusion.  Two-vessel distal runoff via anterior tibial and peroneal arteries  - This is overall resolved since last visit  - We will continue with furosemide on as-needed basis.  Using very sparingly.  - Follow-up with Dr. Greenfield     CAD  Status post CABG  - Would benefit from use of aspirin  - On metoprolol 100 mg twice a day     Paroxysmal A-fib  Status post Maze procedure.  - On metoprolol 100 mg twice a day  - Declining use of anticoagulation.    - Pacemaker in place     Type 2 diabetes:   Recent A1c:   HEMOGLOBIN A1C (%)   Date Value   10/18/2023 6.8 (A)     HgbA1C (%)   Date Value   04/04/2024 7.8 (H)     Recent urine microalbumin: No components  found for: \"URINEMICROALBUMIN\"  Current medications: Glipizide 10 mg daily  Eye exam: Denies any referrals for ophthalmology  Foot exam: Follows with podiatry,  -We will increase Accu-Cheks at home, may need refill on test strips     Hypertension  -Blood pressure today at goal  - Check blood pressures at home  - Continue with home metoprolol, lisinopril 20 mg daily, spironolactone 25 mg daily     Hyperlipidemia  - Last lipid panel with , total cholesterol 178  - Not currently on statin     History of colon cancer  Status post hemicolectomy 2019.  - No new recurrences, patient declining any further colonoscopies  - CEA was undetectable     Peripheral neuropathy  - Can be contributing to lower extremity symptoms.  - Trial Lyrica 50 mg twice a day - not currently taking  -Vitamin B12 level at goal  - will try to supplement with vitamin B12, cyanocobalamin 1000 mcg once a day.     Spinal stenosis of lumbar region  Chronic intermittent low back pain  -Would recommend initial trial of conservative therapy:     -Acetaminophen 500-650 mg every 4-6 hours as needed for pain relief  -Ibuprofen 200-400 mg every 8 hours as needed for anti-inflammatory and pain relief  -For more severe pain, on Norco as needed  -Trial of physical therapy can be considered if home exercise regimen insufficient  - This has remained stable       Orders This Visit:  Orders Placed This Encounter   Procedures    Basic Metabolic Panel (8)    Hemoglobin A1C    Lipid Panel [E]       Meds This Visit:  Requested Prescriptions      No prescriptions requested or ordered in this encounter       Imaging & Referrals:  None     Health Maintenance  Due for colon cancer screening, eye examination  Due for Prevnar 20, shingles vaccine series    Spent 30 minutes obtaining history, evaluating patient, discussing treatment options, diet, exercise, review of available labs and radiology reports, and completing documentation.     Aaron Helton MD, 09/30/24, 2:55  PM

## 2024-09-29 NOTE — PATIENT INSTRUCTIONS
You are seen in clinic today for follow-up.  Today, we did review your most recent ultrasound which did show some occlusion around the area of the stent.  - Lets continue managing leg swelling, we can use Lasix on a as needed basis.  The leg swelling is much better and we can use the furosemide sparingly  - May need to follow-up sooner with Dr. Greenfield for further surveillance    Monitor for episodes of back pain for which we can recommend:  -Acetaminophen 500-650 mg every 4-6 hours as needed for pain relief  -Ibuprofen 200-400 mg every 8 hours as needed for anti-inflammatory and pain relief  -For more severe pain, notify us as we can consider alternative medication  -Trial of physical therapy can be considered if home exercise regimen insufficient    Diabetes can be better controlled with a goal less than 7.0  - Please continue trying to cut down on carbohydrates not just in sweets but also in bread/grain/rice/breads  - Targeting weight loss over time can help by optimizing nutrition, targeting exercise as tolerated  - If no improvement, may need to consider adjusting the medication regimen further    Periodically check blood pressures at home    We are repeating some blood test today to follow-up on potassium and kidney function.  Please fast over 10-12 hours and we will notify you of the blood test results.  Please obtain this within the next 3-6 weeks for follow-up    Return to clinic in 6 months for annual physical examination

## 2024-09-30 ENCOUNTER — OFFICE VISIT (OUTPATIENT)
Dept: INTERNAL MEDICINE CLINIC | Facility: CLINIC | Age: 74
End: 2024-09-30

## 2024-09-30 VITALS
OXYGEN SATURATION: 99 % | SYSTOLIC BLOOD PRESSURE: 126 MMHG | BODY MASS INDEX: 28.75 KG/M2 | DIASTOLIC BLOOD PRESSURE: 78 MMHG | HEART RATE: 88 BPM | WEIGHT: 181 LBS | HEIGHT: 66.5 IN | TEMPERATURE: 98 F

## 2024-09-30 DIAGNOSIS — I73.9 PAD (PERIPHERAL ARTERY DISEASE) (HCC): Primary | ICD-10-CM

## 2024-09-30 DIAGNOSIS — I48.0 PAROXYSMAL ATRIAL FIBRILLATION (HCC): ICD-10-CM

## 2024-09-30 DIAGNOSIS — I10 ESSENTIAL HYPERTENSION WITH GOAL BLOOD PRESSURE LESS THAN 140/90: ICD-10-CM

## 2024-09-30 DIAGNOSIS — M48.061 SPINAL STENOSIS OF LUMBAR REGION, UNSPECIFIED WHETHER NEUROGENIC CLAUDICATION PRESENT: ICD-10-CM

## 2024-09-30 DIAGNOSIS — E11.9 TYPE 2 DIABETES MELLITUS WITHOUT COMPLICATION, WITHOUT LONG-TERM CURRENT USE OF INSULIN (HCC): ICD-10-CM

## 2024-09-30 DIAGNOSIS — I25.10 CORONARY ARTERY DISEASE INVOLVING NATIVE CORONARY ARTERY OF NATIVE HEART WITHOUT ANGINA PECTORIS: ICD-10-CM

## 2024-09-30 DIAGNOSIS — E78.5 HYPERLIPIDEMIA, UNSPECIFIED HYPERLIPIDEMIA TYPE: ICD-10-CM

## 2024-09-30 DIAGNOSIS — G62.9 PERIPHERAL POLYNEUROPATHY: ICD-10-CM

## 2024-09-30 DIAGNOSIS — Z13.5 DIABETIC RETINOPATHY SCREENING: ICD-10-CM

## 2024-09-30 PROCEDURE — 99214 OFFICE O/P EST MOD 30 MIN: CPT | Performed by: INTERNAL MEDICINE

## 2024-10-22 ENCOUNTER — TELEPHONE (OUTPATIENT)
Dept: INTERNAL MEDICINE CLINIC | Facility: CLINIC | Age: 74
End: 2024-10-22

## 2024-10-22 DIAGNOSIS — F41.9 ANXIETY: ICD-10-CM

## 2024-10-23 RX ORDER — ALPRAZOLAM 0.5 MG
0.5 TABLET ORAL 2 TIMES DAILY PRN
Qty: 60 TABLET | Refills: 5 | Status: SHIPPED | OUTPATIENT
Start: 2024-10-23

## 2024-10-23 NOTE — TELEPHONE ENCOUNTER
To MD:  The above refill request is for a controlled substance.  Please review pended medication order.   Print and sign for staff to fax to pharmacy or prescribe electronically.    Last office visit: 9/30/2024  Last time refill sent and quantity/refills:  Last ordered on 4/10/2024, #60/5  Per [place] , last dispensed on 9/6/2024, #60/0

## 2024-10-26 DIAGNOSIS — F41.9 ANXIETY: ICD-10-CM

## 2024-10-28 RX ORDER — ALPRAZOLAM 0.5 MG
0.5 TABLET ORAL 2 TIMES DAILY PRN
Qty: 60 TABLET | Refills: 5 | OUTPATIENT
Start: 2024-10-28

## 2024-10-28 NOTE — TELEPHONE ENCOUNTER
Current refill request refused due to refill is either a duplicate request or has active refills at the pharmacy.  Check previous templates.    Requested Prescriptions     Refused Prescriptions Disp Refills    ALPRAZolam 0.5 MG Oral Tab 60 tablet 5     Sig: Take 1 tablet (0.5 mg total) by mouth 2 (two) times daily as needed.     Refused By: DESTINY PULIDO     Reason for Refusal: Duplicate refill request      Sent on 10/23/2024, #60 with 5 additional refills.

## 2024-11-12 ENCOUNTER — LAB ENCOUNTER (OUTPATIENT)
Dept: LAB | Age: 74
End: 2024-11-12
Attending: INTERNAL MEDICINE
Payer: MEDICARE

## 2024-11-12 DIAGNOSIS — E78.5 HYPERLIPIDEMIA, UNSPECIFIED HYPERLIPIDEMIA TYPE: ICD-10-CM

## 2024-11-12 DIAGNOSIS — E11.9 TYPE 2 DIABETES MELLITUS WITHOUT COMPLICATION, WITHOUT LONG-TERM CURRENT USE OF INSULIN (HCC): ICD-10-CM

## 2024-11-12 DIAGNOSIS — G62.9 PERIPHERAL POLYNEUROPATHY: ICD-10-CM

## 2024-11-12 LAB
ANION GAP SERPL CALC-SCNC: 7 MMOL/L (ref 0–18)
BUN BLD-MCNC: 28 MG/DL (ref 9–23)
BUN/CREAT SERPL: 17.9 (ref 10–20)
CALCIUM BLD-MCNC: 10.2 MG/DL (ref 8.7–10.4)
CHLORIDE SERPL-SCNC: 105 MMOL/L (ref 98–112)
CHOLEST SERPL-MCNC: 177 MG/DL (ref ?–200)
CO2 SERPL-SCNC: 26 MMOL/L (ref 21–32)
CREAT BLD-MCNC: 1.56 MG/DL
EGFRCR SERPLBLD CKD-EPI 2021: 47 ML/MIN/1.73M2 (ref 60–?)
EST. AVERAGE GLUCOSE BLD GHB EST-MCNC: 177 MG/DL (ref 68–126)
FASTING PATIENT LIPID ANSWER: NO
FASTING STATUS PATIENT QL REPORTED: NO
GLUCOSE BLD-MCNC: 147 MG/DL (ref 70–99)
HBA1C MFR BLD: 7.8 % (ref ?–5.7)
HDLC SERPL-MCNC: 42 MG/DL (ref 40–59)
LDLC SERPL CALC-MCNC: 115 MG/DL (ref ?–100)
NONHDLC SERPL-MCNC: 135 MG/DL (ref ?–130)
OSMOLALITY SERPL CALC.SUM OF ELEC: 294 MOSM/KG (ref 275–295)
POTASSIUM SERPL-SCNC: 5.4 MMOL/L (ref 3.5–5.1)
SODIUM SERPL-SCNC: 138 MMOL/L (ref 136–145)
TRIGL SERPL-MCNC: 111 MG/DL (ref 30–149)
VLDLC SERPL CALC-MCNC: 19 MG/DL (ref 0–30)

## 2024-11-12 PROCEDURE — 80061 LIPID PANEL: CPT

## 2024-11-12 PROCEDURE — 36415 COLL VENOUS BLD VENIPUNCTURE: CPT

## 2024-11-12 PROCEDURE — 80048 BASIC METABOLIC PNL TOTAL CA: CPT

## 2024-11-12 PROCEDURE — 83036 HEMOGLOBIN GLYCOSYLATED A1C: CPT

## 2024-11-15 ENCOUNTER — TELEPHONE (OUTPATIENT)
Dept: INTERNAL MEDICINE CLINIC | Facility: CLINIC | Age: 74
End: 2024-11-15

## 2024-11-15 NOTE — TELEPHONE ENCOUNTER
Please notify the patient reviewed the blood work from 11/2  - Sugar levels are about the same but still elevated with A1c 7.8%  -Similarly the cholesterol levels are elevated but stable from the last check.  - Kidney function back down to the usual range    For now, lets continue with moderating carbohydrate intake not just in sweets but also in rice/grain/bread.  We should do our follow-up visit within the next 1-2 months for diabetic follow-up.  Will repeat the levels at that time and consider if we need any changes to medications.

## 2024-11-15 NOTE — TELEPHONE ENCOUNTER
Spoke to patients wife and scheduled a follow up appointment per nurse request for 2/6/2025.    Routed back to triage per nurse request

## 2024-11-15 NOTE — TELEPHONE ENCOUNTER
I spoke to patient and relayed Dr Helton's message to him. He verbalized understanding.  I transferred him to the  to schedule a follow up appt

## 2024-12-03 ENCOUNTER — HOSPITAL ENCOUNTER (OUTPATIENT)
Dept: ULTRASOUND IMAGING | Facility: HOSPITAL | Age: 74
Discharge: HOME | End: 2024-12-03
Attending: RADIOLOGY
Payer: MEDICARE

## 2024-12-03 DIAGNOSIS — I73.9 PAD (PERIPHERAL ARTERY DISEASE) (HCC): ICD-10-CM

## 2024-12-03 PROCEDURE — 93926 LOWER EXTREMITY STUDY: CPT | Performed by: RADIOLOGY

## 2024-12-13 ENCOUNTER — HOSPITAL ENCOUNTER (OUTPATIENT)
Dept: ULTRASOUND IMAGING | Facility: HOSPITAL | Age: 74
Discharge: HOME | End: 2024-12-13
Attending: CLINICAL NURSE SPECIALIST
Payer: MEDICARE

## 2024-12-13 DIAGNOSIS — I73.9 PAD (PERIPHERAL ARTERY DISEASE) (HCC): ICD-10-CM

## 2024-12-13 PROCEDURE — 93926 LOWER EXTREMITY STUDY: CPT | Performed by: CLINICAL NURSE SPECIALIST

## 2024-12-19 ENCOUNTER — HOSPITAL ENCOUNTER (INPATIENT)
Facility: HOSPITAL | Age: 74
LOS: 1 days | Discharge: HOME | DRG: 271 | End: 2024-12-20
Attending: STUDENT IN AN ORGANIZED HEALTH CARE EDUCATION/TRAINING PROGRAM | Admitting: HOSPITALIST
Payer: MEDICARE

## 2024-12-19 ENCOUNTER — HOSPITAL ENCOUNTER (INPATIENT)
Facility: HOSPITAL | Age: 74
LOS: 1 days | Discharge: HOME | End: 2024-12-20
Attending: STUDENT IN AN ORGANIZED HEALTH CARE EDUCATION/TRAINING PROGRAM | Admitting: HOSPITALIST
Payer: MEDICARE

## 2024-12-19 ENCOUNTER — APPOINTMENT (OUTPATIENT)
Dept: INTERVENTIONAL RADIOLOGY/VASCULAR | Facility: HOSPITAL | Age: 74
DRG: 271 | End: 2024-12-19
Attending: RADIOLOGY
Payer: MEDICARE

## 2024-12-19 ENCOUNTER — APPOINTMENT (OUTPATIENT)
Dept: INTERVENTIONAL RADIOLOGY/VASCULAR | Facility: HOSPITAL | Age: 74
End: 2024-12-19
Attending: RADIOLOGY
Payer: MEDICARE

## 2024-12-19 DIAGNOSIS — M79.671 ACUTE PAIN OF RIGHT FOOT: Primary | ICD-10-CM

## 2024-12-19 PROBLEM — I99.8 ACUTE LOWER LIMB ISCHEMIA: Status: ACTIVE | Noted: 2024-12-19

## 2024-12-19 LAB
ANION GAP SERPL CALC-SCNC: 4 MMOL/L (ref 0–18)
APTT PPP: 31 SECONDS (ref 23–36)
APTT PPP: 54.3 SECONDS (ref 23–36)
BASOPHILS # BLD AUTO: 0.06 X10(3) UL (ref 0–0.2)
BASOPHILS NFR BLD AUTO: 0.7 %
BUN BLD-MCNC: 22 MG/DL (ref 9–23)
BUN/CREAT SERPL: 14.4 (ref 10–20)
CALCIUM BLD-MCNC: 9.9 MG/DL (ref 8.7–10.4)
CHLORIDE SERPL-SCNC: 109 MMOL/L (ref 98–112)
CO2 SERPL-SCNC: 26 MMOL/L (ref 21–32)
CREAT BLD-MCNC: 1.53 MG/DL
DEPRECATED RDW RBC AUTO: 43.7 FL (ref 35.1–46.3)
EGFRCR SERPLBLD CKD-EPI 2021: 48 ML/MIN/1.73M2 (ref 60–?)
EOSINOPHIL # BLD AUTO: 0.17 X10(3) UL (ref 0–0.7)
EOSINOPHIL NFR BLD AUTO: 2 %
ERYTHROCYTE [DISTWIDTH] IN BLOOD BY AUTOMATED COUNT: 14.2 % (ref 11–15)
GLUCOSE BLD-MCNC: 182 MG/DL (ref 70–99)
GLUCOSE BLDC GLUCOMTR-MCNC: 117 MG/DL (ref 70–99)
GLUCOSE BLDC GLUCOMTR-MCNC: 187 MG/DL (ref 70–99)
GLUCOSE BLDC GLUCOMTR-MCNC: 198 MG/DL (ref 70–99)
GLUCOSE BLDC GLUCOMTR-MCNC: 57 MG/DL (ref 70–99)
GLUCOSE BLDC GLUCOMTR-MCNC: 63 MG/DL (ref 70–99)
GLUCOSE BLDC GLUCOMTR-MCNC: 87 MG/DL (ref 70–99)
HCT VFR BLD AUTO: 38.7 %
HGB BLD-MCNC: 13.7 G/DL
IMM GRANULOCYTES # BLD AUTO: 0.02 X10(3) UL (ref 0–1)
IMM GRANULOCYTES NFR BLD: 0.2 %
INR BLD: 1.02 (ref 0.8–1.2)
ISTAT ACTIVATED CLOTTING TIME: 250 SECONDS (ref 125–137)
ISTAT ACTIVATED CLOTTING TIME: 279 SECONDS (ref 125–137)
LYMPHOCYTES # BLD AUTO: 0.88 X10(3) UL (ref 1–4)
LYMPHOCYTES NFR BLD AUTO: 10.1 %
MCH RBC QN AUTO: 29.8 PG (ref 26–34)
MCHC RBC AUTO-ENTMCNC: 35.4 G/DL (ref 31–37)
MCV RBC AUTO: 84.1 FL
MONOCYTES # BLD AUTO: 0.57 X10(3) UL (ref 0.1–1)
MONOCYTES NFR BLD AUTO: 6.6 %
NEUTROPHILS # BLD AUTO: 6.99 X10 (3) UL (ref 1.5–7.7)
NEUTROPHILS # BLD AUTO: 6.99 X10(3) UL (ref 1.5–7.7)
NEUTROPHILS NFR BLD AUTO: 80.4 %
OSMOLALITY SERPL CALC.SUM OF ELEC: 296 MOSM/KG (ref 275–295)
PLATELET # BLD AUTO: 149 10(3)UL (ref 150–450)
POTASSIUM SERPL-SCNC: 5 MMOL/L (ref 3.5–5.1)
PROTHROMBIN TIME: 14 SECONDS (ref 11.6–14.8)
RBC # BLD AUTO: 4.6 X10(6)UL
SODIUM SERPL-SCNC: 139 MMOL/L (ref 136–145)
WBC # BLD AUTO: 8.7 X10(3) UL (ref 4–11)

## 2024-12-19 PROCEDURE — 99223 1ST HOSP IP/OBS HIGH 75: CPT | Performed by: HOSPITALIST

## 2024-12-19 PROCEDURE — 047M3DZ DILATION OF RIGHT POPLITEAL ARTERY WITH INTRALUMINAL DEVICE, PERCUTANEOUS APPROACH: ICD-10-PCS | Performed by: RADIOLOGY

## 2024-12-19 PROCEDURE — B41F1ZZ FLUOROSCOPY OF RIGHT LOWER EXTREMITY ARTERIES USING LOW OSMOLAR CONTRAST: ICD-10-PCS | Performed by: RADIOLOGY

## 2024-12-19 PROCEDURE — 047K342 DILATION OF RIGHT FEMORAL ARTERY WITH DRUG-ELUTING INTRALUMINAL DEVICE, SUSTAINED RELEASE, PERCUTANEOUS APPROACH: ICD-10-PCS | Performed by: RADIOLOGY

## 2024-12-19 PROCEDURE — 04CK3ZZ EXTIRPATION OF MATTER FROM RIGHT FEMORAL ARTERY, PERCUTANEOUS APPROACH: ICD-10-PCS | Performed by: RADIOLOGY

## 2024-12-19 RX ORDER — NICOTINE POLACRILEX 4 MG
30 LOZENGE BUCCAL
Status: DISCONTINUED | OUTPATIENT
Start: 2024-12-19 | End: 2024-12-20

## 2024-12-19 RX ORDER — HYDROMORPHONE HYDROCHLORIDE 1 MG/ML
1 INJECTION, SOLUTION INTRAMUSCULAR; INTRAVENOUS; SUBCUTANEOUS EVERY 2 HOUR PRN
Status: DISCONTINUED | OUTPATIENT
Start: 2024-12-19 | End: 2024-12-20

## 2024-12-19 RX ORDER — METOCLOPRAMIDE HYDROCHLORIDE 5 MG/ML
5 INJECTION INTRAMUSCULAR; INTRAVENOUS EVERY 8 HOURS PRN
Status: DISCONTINUED | OUTPATIENT
Start: 2024-12-19 | End: 2024-12-20

## 2024-12-19 RX ORDER — HYDROMORPHONE HYDROCHLORIDE 1 MG/ML
2 INJECTION, SOLUTION INTRAMUSCULAR; INTRAVENOUS; SUBCUTANEOUS EVERY 2 HOUR PRN
Status: DISCONTINUED | OUTPATIENT
Start: 2024-12-19 | End: 2024-12-20

## 2024-12-19 RX ORDER — DEXTROSE MONOHYDRATE 25 G/50ML
50 INJECTION, SOLUTION INTRAVENOUS
Status: DISCONTINUED | OUTPATIENT
Start: 2024-12-19 | End: 2024-12-20

## 2024-12-19 RX ORDER — HEPARIN SODIUM 1000 [USP'U]/ML
80 INJECTION, SOLUTION INTRAVENOUS; SUBCUTANEOUS ONCE
Status: COMPLETED | OUTPATIENT
Start: 2024-12-19 | End: 2024-12-19

## 2024-12-19 RX ORDER — MORPHINE SULFATE 2 MG/ML
2 INJECTION, SOLUTION INTRAMUSCULAR; INTRAVENOUS EVERY 2 HOUR PRN
Status: DISCONTINUED | OUTPATIENT
Start: 2024-12-19 | End: 2024-12-19

## 2024-12-19 RX ORDER — IOPAMIDOL 612 MG/ML
230 INJECTION, SOLUTION INTRAVASCULAR
Status: COMPLETED | OUTPATIENT
Start: 2024-12-19 | End: 2024-12-19

## 2024-12-19 RX ORDER — TEMAZEPAM 15 MG/1
15 CAPSULE ORAL NIGHTLY PRN
Status: DISCONTINUED | OUTPATIENT
Start: 2024-12-19 | End: 2024-12-20

## 2024-12-19 RX ORDER — HEPARIN SODIUM 1000 [USP'U]/ML
INJECTION, SOLUTION INTRAVENOUS; SUBCUTANEOUS
Status: DISCONTINUED
Start: 2024-12-19 | End: 2024-12-19 | Stop reason: WASHOUT

## 2024-12-19 RX ORDER — ACETAMINOPHEN 500 MG
500 TABLET ORAL EVERY 4 HOURS PRN
Status: DISCONTINUED | OUTPATIENT
Start: 2024-12-19 | End: 2024-12-20

## 2024-12-19 RX ORDER — HEPARIN SODIUM 1000 [USP'U]/ML
INJECTION, SOLUTION INTRAVENOUS; SUBCUTANEOUS
Status: COMPLETED
Start: 2024-12-19 | End: 2024-12-19

## 2024-12-19 RX ORDER — SPIRONOLACTONE 25 MG/1
25 TABLET ORAL DAILY
Status: DISCONTINUED | OUTPATIENT
Start: 2024-12-19 | End: 2024-12-19

## 2024-12-19 RX ORDER — MORPHINE SULFATE 2 MG/ML
1 INJECTION, SOLUTION INTRAMUSCULAR; INTRAVENOUS EVERY 2 HOUR PRN
Status: DISCONTINUED | OUTPATIENT
Start: 2024-12-19 | End: 2024-12-19

## 2024-12-19 RX ORDER — HEPARIN SODIUM AND DEXTROSE 10000; 5 [USP'U]/100ML; G/100ML
INJECTION INTRAVENOUS CONTINUOUS
Status: DISCONTINUED | OUTPATIENT
Start: 2024-12-19 | End: 2024-12-19

## 2024-12-19 RX ORDER — LISINOPRIL 20 MG/1
20 TABLET ORAL 2 TIMES DAILY
Status: DISCONTINUED | OUTPATIENT
Start: 2024-12-20 | End: 2024-12-20

## 2024-12-19 RX ORDER — ASPIRIN 81 MG/1
81 TABLET ORAL DAILY
Status: DISCONTINUED | OUTPATIENT
Start: 2024-12-19 | End: 2024-12-20

## 2024-12-19 RX ORDER — PROTAMINE SULFATE 10 MG/ML
INJECTION, SOLUTION INTRAVENOUS
Status: COMPLETED
Start: 2024-12-19 | End: 2024-12-19

## 2024-12-19 RX ORDER — ONDANSETRON 2 MG/ML
4 INJECTION INTRAMUSCULAR; INTRAVENOUS EVERY 6 HOURS PRN
Status: DISCONTINUED | OUTPATIENT
Start: 2024-12-19 | End: 2024-12-20

## 2024-12-19 RX ORDER — NICOTINE POLACRILEX 4 MG
15 LOZENGE BUCCAL
Status: DISCONTINUED | OUTPATIENT
Start: 2024-12-19 | End: 2024-12-20

## 2024-12-19 RX ORDER — CLOPIDOGREL BISULFATE 75 MG/1
TABLET ORAL
Status: COMPLETED
Start: 2024-12-19 | End: 2024-12-19

## 2024-12-19 RX ORDER — LIDOCAINE HYDROCHLORIDE 20 MG/ML
INJECTION, SOLUTION EPIDURAL; INFILTRATION; INTRACAUDAL; PERINEURAL
Status: COMPLETED
Start: 2024-12-19 | End: 2024-12-19

## 2024-12-19 RX ORDER — HYDROMORPHONE HYDROCHLORIDE 1 MG/ML
0.5 INJECTION, SOLUTION INTRAMUSCULAR; INTRAVENOUS; SUBCUTANEOUS EVERY 2 HOUR PRN
Status: DISCONTINUED | OUTPATIENT
Start: 2024-12-19 | End: 2024-12-20

## 2024-12-19 RX ORDER — MIDAZOLAM HYDROCHLORIDE 1 MG/ML
INJECTION INTRAMUSCULAR; INTRAVENOUS
Status: COMPLETED
Start: 2024-12-19 | End: 2024-12-19

## 2024-12-19 RX ORDER — METOPROLOL SUCCINATE 100 MG/1
100 TABLET, EXTENDED RELEASE ORAL 2 TIMES DAILY
Status: DISCONTINUED | OUTPATIENT
Start: 2024-12-19 | End: 2024-12-20

## 2024-12-19 RX ORDER — HEPARIN SODIUM AND DEXTROSE 10000; 5 [USP'U]/100ML; G/100ML
18 INJECTION INTRAVENOUS ONCE
Status: COMPLETED | OUTPATIENT
Start: 2024-12-19 | End: 2024-12-19

## 2024-12-19 RX ORDER — MORPHINE SULFATE 4 MG/ML
4 INJECTION, SOLUTION INTRAMUSCULAR; INTRAVENOUS ONCE
Status: COMPLETED | OUTPATIENT
Start: 2024-12-19 | End: 2024-12-19

## 2024-12-19 RX ORDER — MORPHINE SULFATE 4 MG/ML
4 INJECTION, SOLUTION INTRAMUSCULAR; INTRAVENOUS EVERY 2 HOUR PRN
Status: DISCONTINUED | OUTPATIENT
Start: 2024-12-19 | End: 2024-12-19

## 2024-12-19 RX ORDER — ALPRAZOLAM 0.25 MG/1
0.5 TABLET ORAL NIGHTLY
Status: DISCONTINUED | OUTPATIENT
Start: 2024-12-19 | End: 2024-12-20

## 2024-12-19 NOTE — ED PROVIDER NOTES
Patient Seen in: [facility] Emergency Department      History     Chief Complaint   Patient presents with    Leg Pain     Stated Complaint: leg pain    Subjective:   HPI    74-year-old male with a history of PAD status post right SFA, PT and AT angioplasty, CAD, DM, HTN, who presents with right leg and foot pain.  1 week history of worsening symptoms.  He states at baseline he has some neuropathy in the leg which causes him some intermittent burning pain however he said significant severe and relatively constant pain over the last 1 week.  Pain has been escalating.  Pain is worse with exertion walking or bearing weight on the leg.  Does have some pain at rest.  He denies numbness or tingling in the foot. Was seen on 12/13 - had Arterial duplex of RLE, and was tentatively scheduled for angio on 12/30. On ASA, no other thinners.       Objective:     Past Medical History:    Atherosclerosis of coronary artery    CAD (coronary artery disease)    Cancer (HCC)    Colon cancer (HCC)    COVID-19 virus infection    Oct 2020    Diabetes (HCC)    DM2 (diabetes mellitus, type 2) (HCC)    Essential hypertension    HTN (hypertension)    Hyperlipidemia    Other abnormal glucose    rubber band hemorroid    Paroxysmal atrial fibrillation (HCC)              Past Surgical History:   Procedure Laterality Date    Other      MAZE    Other      CABAG    Other      appendectomy    Other      pacer    Other       Status post hemicolectomy, July 2019                Social History     Socioeconomic History    Marital status:    Tobacco Use    Smoking status: Former    Smokeless tobacco: Never   Vaping Use    Vaping status: Every Day    Substances: Nicotine    Devices: Refillable tank   Substance and Sexual Activity    Alcohol use: No     Alcohol/week: 0.0 standard drinks of alcohol    Drug use: No                  Physical Exam     ED Triage Vitals [12/19/24 1056]   BP (!) 175/70   Pulse 58   Resp 18   Temp 96.7 °F (35.9 °C)    Temp src Temporal   SpO2 98 %   O2 Device None (Room air)       Current Vitals:   Vital Signs  BP: (!) 175/70  Pulse: 58  Resp: 18  Temp: 96.7 °F (35.9 °C)  Temp src: Temporal    Oxygen Therapy  SpO2: 98 %  O2 Device: None (Room air)        Physical Exam  Constitutional: awake, alert, no sig distress  HENT: mmm, no lesions,  Neck: normal range of motion, no tenderness, supple.  Eyes: PERRL, EOMI, conjunctiva normal, no discharge. Sclera anicteric.  Cardiovascular: rr no murmur  Respiratory: Normal breath sounds, no respiratory distress, no wheezing, no chest tenderness.  GI: Bowel sounds normal, Soft, no tenderness, no masses, no pulsatile masses.  : No CVA tenderness.  Skin: Warm, dry, no erythema, no rash.  RLE exam: Right foot is erythematous, slightly cool to touch when compared to left. Monophasic Rt PT signal noted, no Rt DP signal. Sensation, strength grossly intact.   Neurologic: Alert & oriented x 3, normal motor function, normal sensory function, no focal deficits noted.  Psych: Calm, cooperative, nl affect        ED Course     Labs Reviewed   BASIC METABOLIC PANEL (8)   CBC WITH DIFFERENTIAL WITH PLATELET   PTT, ACTIVATED   PROTHROMBIN TIME (PT)                   MDM      74M with history as above presenting for evaluation of worsening right leg right foot pain.  On arrival he is hypertensive other vitals are stable and reassuring.  On exam he has a monophasic signal to right PT no appreciable right DP signal.  He has pain at rest, worsened with exertion or walking.  -He has ultrasound from 12/13 which is significant for recurrent occlusions in the SFA, PT and AT with collaterals.   -d/w IR Dr Greenfield - who will tentatively plan for IR intervention/angio today, will make NPO, start heparin gtt.       I spent a total of 39 minutes of critical care time in obtaining history, performing a physical exam, bedside monitoring of interventions, collecting and interpreting tests and discussion with consultants  but not including time spent performing procedures.      Medical Decision Making      Disposition and Plan     Clinical Impression:  1. Acute pain of right foot         Disposition:  Admit  12/19/2024 12:02 pm    Follow-up:  No follow-up provider specified.  We recommend that you schedule follow up care with a primary care provider within the next three months to obtain basic health screening including reassessment of your blood pressure.      Medications Prescribed:  Current Discharge Medication List              Supplementary Documentation:         Hospital Problems       Present on Admission  Date Reviewed: 9/30/2024            ICD-10-CM Noted POA    Acute pain of right foot M79.671 12/19/2024 Unknown

## 2024-12-19 NOTE — H&P
[facility]    PATIENT'S NAME: ADA REDD   ATTENDING PHYSICIAN: Jose Carlos Wilson MD   PATIENT ACCOUNT#:   [id number]    LOCATION:  [address]  MEDICAL RECORD #:   [id number]       YOB: 1950  ADMISSION DATE:       12/19/2024    HISTORY AND PHYSICAL EXAMINATION    CHIEF COMPLAINT:  Progressive acute ischemia, right lower extremity.    HISTORY OF PRESENT ILLNESS:  The patient is a 74-year-old  male with known underlying peripheral arterial disease with multiple prior interventions to his right lower extremity.  Recently started developing ischemic pain in his right foot and distal leg.  He had a Doppler ultrasound done on December 13 which showed recurrent occlusion of superficial femoral artery, anterior and posterior tibial occlusions, with collaterals to the peroneal trunk.  Today he came in with progressive pain and erythema of his right foot.  CBC and chemistry unremarkable.  GFR is 48, which is at baseline.  Started on IV heparin, and he will be admitted to the hospital for further management.    PAST MEDICAL HISTORY:  Peripheral arterial disease, status post right superficial artery stent and angioplasty to the anterior and posterior tibial arteries.  He has diabetes mellitus type 2, hypertension, hyperlipidemia, chronic kidney disease stage 3, coronary artery disease.  Sick sinus syndrome, status post permanent pacemaker.    PAST SURGICAL HISTORY:  Coronary artery bypass graft surgery with Maze procedure, appendectomy, right hemicolectomy for colon cancer.    MEDICATIONS:  Please see medication reconciliation list.     ALLERGIES:  No known drug allergies.     FAMILY HISTORY:  Positive for diabetes mellitus type 2 and hypertension.    SOCIAL HISTORY:  Ex-tobacco user.  No current tobacco, alcohol, or drug use.  Lives with his family.  Independent in his basic activities of daily living.     REVIEW OF SYSTEMS:  The patient reports throbbing pain in his right  distal leg and right foot, especially when he dangles his right foot to gravity.  Pain has become progressive for the last 10 days, but in the last 2 days started developing erythema in his right foot.  Other 12-point review of systems is negative.        PHYSICAL EXAMINATION:    GENERAL:  Alert and oriented to time, place, and person.  Moderate distress.    VITAL SIGNS:  Temperature 96.7, pulse 55, respiratory rate 18, blood pressure 159/68, pulse ox 100% on room air.  HEENT:  Atraumatic.  Oropharynx clear.  Moist mucous membranes.  Ears and nose normal.  Eyes:  Anicteric sclerae.  NECK:  Supple.  No lymphadenopathy.  Trachea midline.  Full range of motion.  LUNGS:  Clear to auscultation bilaterally.  Normal respiratory effort.    HEART:  Regular rate and rhythm.  S1 and S2 auscultated.  No murmur.  ABDOMEN:  Soft, nondistended.  No tenderness.  Positive bowel sounds.  EXTREMITIES:  Edema +1, left leg.  Right leg:  No edema with ischemic changes noted at the forefoot area with shiny erythema.  Dorsalis pedis on the right side could not be palpated.  Right forefoot and hindfoot cool to touch.  NEUROLOGIC:  Motor and sensory intact.  Right foot is very sensitive to touch and painful.    ASSESSMENT:    1.   Acute progressive right lower extremity limb ischemia with underlying occlusion, superficial femoral artery, anterior and posterior tibial arteries.  2.   Essential hypertension.  3.   Diabetes mellitus type 2.  4.   Chronic kidney disease stage 3.    PLAN:  The patient will be admitted to general medical floor.  IV heparin.  Interventional Radiology consult for right lower extremity angiogram and angioplasty intervention.  Monitor Accu-Cheks.  Pain control.  Further recommendations to follow.      Dictated By Beryl Cabral MD  d: 12/19/2024 12:50:33  t: 12/19/2024 12:56:56  Job [id number]  FB/    cc: Jose Carlos Wilson MD

## 2024-12-19 NOTE — ED INITIAL ASSESSMENT (HPI)
Pt had a stent placement to right leg-- hx of PAD- Pt see's Dr. Clancy. Pt had a wound to right foot that is healing. Per wife \"They are saying the stent is partially occluded. Pt denies cp/sob. + redness to foot. + swelling to right foot. Unable to palpate right pedal pulse.

## 2024-12-19 NOTE — ED QUICK NOTES
Orders for admission, patient is aware of plan and ready to go upstairs. Any questions, please call ED RN tone at extension [phone].     Patient Covid vaccination status: Fully vaccinated     COVID Test Ordered in ED: None    COVID Suspicion at Admission: N/A    Running Infusions:    continuous dose heparin      None    Mental Status/LOC at time of transport: x4    Other pertinent information:   CIWA score: N/A   NIH score:  N/A

## 2024-12-20 VITALS
SYSTOLIC BLOOD PRESSURE: 147 MMHG | HEIGHT: 66 IN | RESPIRATION RATE: 18 BRPM | HEART RATE: 68 BPM | BODY MASS INDEX: 28.15 KG/M2 | DIASTOLIC BLOOD PRESSURE: 71 MMHG | OXYGEN SATURATION: 100 % | WEIGHT: 175.13 LBS | TEMPERATURE: 98 F

## 2024-12-20 LAB
ANION GAP SERPL CALC-SCNC: 8 MMOL/L (ref 0–18)
BASOPHILS # BLD AUTO: 0.07 X10(3) UL (ref 0–0.2)
BASOPHILS NFR BLD AUTO: 1 %
BUN BLD-MCNC: 19 MG/DL (ref 9–23)
BUN/CREAT SERPL: 13.4 (ref 10–20)
CALCIUM BLD-MCNC: 9.8 MG/DL (ref 8.7–10.4)
CHLORIDE SERPL-SCNC: 109 MMOL/L (ref 98–112)
CO2 SERPL-SCNC: 24 MMOL/L (ref 21–32)
CREAT BLD-MCNC: 1.42 MG/DL
DEPRECATED RDW RBC AUTO: 43.5 FL (ref 35.1–46.3)
EGFRCR SERPLBLD CKD-EPI 2021: 53 ML/MIN/1.73M2 (ref 60–?)
EOSINOPHIL # BLD AUTO: 0.16 X10(3) UL (ref 0–0.7)
EOSINOPHIL NFR BLD AUTO: 2.3 %
ERYTHROCYTE [DISTWIDTH] IN BLOOD BY AUTOMATED COUNT: 14.1 % (ref 11–15)
GLUCOSE BLD-MCNC: 202 MG/DL (ref 70–99)
GLUCOSE BLDC GLUCOMTR-MCNC: 155 MG/DL (ref 70–99)
GLUCOSE BLDC GLUCOMTR-MCNC: 209 MG/DL (ref 70–99)
HCT VFR BLD AUTO: 36.9 %
HGB BLD-MCNC: 12.7 G/DL
IMM GRANULOCYTES # BLD AUTO: 0.02 X10(3) UL (ref 0–1)
IMM GRANULOCYTES NFR BLD: 0.3 %
LYMPHOCYTES # BLD AUTO: 0.42 X10(3) UL (ref 1–4)
LYMPHOCYTES NFR BLD AUTO: 6 %
MCH RBC QN AUTO: 28.8 PG (ref 26–34)
MCHC RBC AUTO-ENTMCNC: 34.4 G/DL (ref 31–37)
MCV RBC AUTO: 83.7 FL
MONOCYTES # BLD AUTO: 0.58 X10(3) UL (ref 0.1–1)
MONOCYTES NFR BLD AUTO: 8.3 %
NEUTROPHILS # BLD AUTO: 5.73 X10 (3) UL (ref 1.5–7.7)
NEUTROPHILS # BLD AUTO: 5.73 X10(3) UL (ref 1.5–7.7)
NEUTROPHILS NFR BLD AUTO: 82.1 %
OSMOLALITY SERPL CALC.SUM OF ELEC: 300 MOSM/KG (ref 275–295)
PLATELET # BLD AUTO: 145 10(3)UL (ref 150–450)
PLATELETS.RETICULATED NFR BLD AUTO: 5.7 % (ref 0–7)
POTASSIUM SERPL-SCNC: 4.6 MMOL/L (ref 3.5–5.1)
RBC # BLD AUTO: 4.41 X10(6)UL
SODIUM SERPL-SCNC: 141 MMOL/L (ref 136–145)
WBC # BLD AUTO: 7 X10(3) UL (ref 4–11)

## 2024-12-20 PROCEDURE — 99239 HOSP IP/OBS DSCHRG MGMT >30: CPT | Performed by: INTERNAL MEDICINE

## 2024-12-20 RX ORDER — CLOPIDOGREL BISULFATE 75 MG/1
75 TABLET ORAL DAILY
Qty: 30 TABLET | Refills: 0 | Status: SHIPPED | OUTPATIENT
Start: 2024-12-20

## 2024-12-20 RX ORDER — HYDROCODONE BITARTRATE AND ACETAMINOPHEN 5; 325 MG/1; MG/1
1 TABLET ORAL EVERY 8 HOURS PRN
Status: DISCONTINUED | OUTPATIENT
Start: 2024-12-20 | End: 2024-12-20

## 2024-12-20 NOTE — PLAN OF CARE
Problem: Patient Centered Care  Goal: Patient preferences are identified and integrated in the patient's plan of care  Description: Interventions:  - What would you like us to know as we care for you? From home with spouse  - Provide timely, complete, and accurate information to patient/family  - Incorporate patient and family knowledge, values, beliefs, and cultural backgrounds into the planning and delivery of care  - Encourage patient/family to participate in care and decision-making at the level they choose  - Honor patient and family perspectives and choices  Outcome: Progressing     Problem: Diabetes/Glucose Control  Goal: Glucose maintained within prescribed range  Description: INTERVENTIONS:  - Monitor Blood Glucose as ordered  - Assess for signs and symptoms of hyperglycemia and hypoglycemia  - Administer ordered medications to maintain glucose within target range  - Assess barriers to adequate nutritional intake and initiate nutrition consult as needed  - Instruct patient on self management of diabetes  Outcome: Progressing     Problem: Patient/Family Goals  Goal: Patient/Family Long Term Goal  Description: Patient's Long Term Goal: to return home    Interventions:  - take medications as prescribed  - follow MD orders  - monitor labs  - See additional Care Plan goals for specific interventions  Outcome: Progressing  Goal: Patient/Family Short Term Goal  Description: Patient's Short Term Goal: to report pain    Interventions:   - PRN pain meds  - labs/testing  - See additional Care Plan goals for specific interventions  Outcome: Progressing     Problem: CARDIOVASCULAR - ADULT  Goal: Maintains optimal cardiac output and hemodynamic stability  Description: INTERVENTIONS:  - Monitor vital signs, rhythm, and trends  - Monitor for bleeding, hypotension and signs of decreased cardiac output  - Evaluate effectiveness of vasoactive medications to optimize hemodynamic stability  - Monitor arterial and/or venous  puncture sites for bleeding and/or hematoma  - Assess quality of pulses, skin color and temperature  - Assess for signs of decreased coronary artery perfusion - ex. Angina  - Evaluate fluid balance, assess for edema, trend weights  Outcome: Progressing     Problem: CARDIOVASCULAR - ADULT  Goal: Absence of cardiac arrhythmias or at baseline  Description: INTERVENTIONS:  - Continuous cardiac monitoring, monitor vital signs, obtain 12 lead EKG if indicated  - Evaluate effectiveness of antiarrhythmic and heart rate control medications as ordered  - Initiate emergency measures for life threatening arrhythmias  - Monitor electrolytes and administer replacement therapy as ordered  Outcome: Progressing     Problem: PAIN - ADULT  Goal: Verbalizes/displays adequate comfort level or patient's stated pain goal  Description: INTERVENTIONS:  - Encourage pt to monitor pain and request assistance  - Assess pain using appropriate pain scale  - Administer analgesics based on type and severity of pain and evaluate response  - Implement non-pharmacological measures as appropriate and evaluate response  - Consider cultural and social influences on pain and pain management  - Manage/alleviate anxiety  - Utilize distraction and/or relaxation techniques  - Monitor for opioid side effects  - Notify MD/LIP if interventions unsuccessful or patient reports new pain  - Anticipate increased pain with activity and pre-medicate as appropriate  Outcome: Progressing     Problem: SAFETY ADULT - FALL  Goal: Free from fall injury  Description: INTERVENTIONS:  - Assess pt frequently for physical needs  - Identify cognitive and physical deficits and behaviors that affect risk of falls.  - [place] fall precautions as indicated by assessment.  - Educate pt/family on patient safety including physical limitations  - Instruct pt to call for assistance with activity based on assessment  - Modify environment to reduce risk of injury  - Provide assistive  devices as appropriate  - Consider OT/PT consult to assist with strengthening/mobility  - Encourage toileting schedule  Outcome: Progressing     Patient alert and oriented x4.  PRN Dilaudid for pain.  Call light within reach.  Left groin dressing clean, dry and intact, no hematoma.  Safety precautions in place.

## 2024-12-20 NOTE — PLAN OF CARE
Problem: Patient Centered Care  Goal: Patient preferences are identified and integrated in the patient's plan of care  Description: Interventions:  - What would you like us to know as we care for you?   - Provide timely, complete, and accurate information to patient/family  - Incorporate patient and family knowledge, values, beliefs, and cultural backgrounds into the planning and delivery of care  - Encourage patient/family to participate in care and decision-making at the level they choose  - Honor patient and family perspectives and choices  Outcome: Progressing     Problem: Diabetes/Glucose Control  Goal: Glucose maintained within prescribed range  Description: INTERVENTIONS:  - Monitor Blood Glucose as ordered  - Assess for signs and symptoms of hyperglycemia and hypoglycemia  - Administer ordered medications to maintain glucose within target range  - Assess barriers to adequate nutritional intake and initiate nutrition consult as needed  - Instruct patient on self management of diabetes  Outcome: Progressing     Problem: Patient/Family Goals  Goal: Patient/Family Long Term Goal  Description: Patient's Long Term Goal:     Interventions:  -   - See additional Care Plan goals for specific interventions  Outcome: Progressing  Goal: Patient/Family Short Term Goal  Description: Patient's Short Term Goal:     Interventions:   -   - See additional Care Plan goals for specific interventions  Outcome: Progressing     Pt alert and oriented X 4. Pt on room air. Pt had complaints of right foot pain. Pt had an angiogram, see results. Safety precautions in place, call light within reach.

## 2024-12-20 NOTE — PROGRESS NOTES
[facility]  part of [facility]  Progress Note      Geovanny Prabhakar Patient Status:  Inpatient    1950 MRN [id number]   Location [facility] 3W/SW Attending Layla Valencia MD   Hosp Day # 1 PCP Aaron Helton MD       Subjective:   Patient resting comfortably, no foot pain.    Objective:   R foot warm.  Strong biphasic Dopplers DP, PT.    L groin cath site clean, dry.  0 hematoma.     Vital Signs:  Blood pressure 138/56, pulse 69, temperature 98 °F (36.7 °C), temperature source Oral, resp. rate 16, height 66\", weight 175 lb 1.6 oz (79.4 kg), SpO2 98%.    Input/Output:    Intake/Output Summary (Last 24 hours) at 2024 0830  Last data filed at 2024 0600  Gross per 24 hour   Intake 430 ml   Output --   Net 430 ml       Results:   Labs:  Lab Results   Component Value Date    WBC 7.0 2024    RBC 4.41 2024    HGB 12.7 2024    HCT 36.9 2024    MCV 83.7 2024    MCH 28.8 2024    MCHC 34.4 2024    RDW 14.1 2024    .0 2024       Assessment and Plan:   S/p R SFA thrombectomy/PTA stent.  Ischemic rest pain in R foot resolved.  R foot warm and acyanotic, strong Doppler signals DP, PT.    Patient okay to discharge home. Given repeat restenosis, occlusion, will keep on triple antithrombotic therapy for 4 weeks.  ASA 81, clopidogrel 75 mg, Xarelto vascular dose 2.5 mg BID.  Plan clinic f/u in 2-3 weeks.           Taiwo Greenfield MD  2024  8:30 AM

## 2024-12-20 NOTE — DISCHARGE SUMMARY
[facility]  part of [facility]    DISCHARGE SUMMARY     Geovanny Prabhakar Patient Status:  Inpatient    1950 MRN [id number]   Location [facility] 3W/SW Attending Layla Valencia MD   Hosp Day # 1 PCP Aaron Helton MD     Date of Admission: 2024  Date of Discharge:  2024    Discharge Disposition: Home or Self Care    Discharge Diagnosis:     RLE ischemia s/p R SFA Thrombectomy/PTA stent  HTN  T2DM  CKD 3    History of Present Illness:     The patient is a 74-year-old  male with known underlying peripheral arterial disease with multiple prior interventions to his right lower extremity. Recently started developing ischemic pain in his right foot and distal leg. He had a Doppler ultrasound done on  which showed recurrent occlusion of superficial femoral artery, anterior and posterior tibial occlusions, with collaterals to the peroneal trunk. Today he came in with progressive pain and erythema of his right foot. CBC and chemistry unremarkable. GFR is 48, which is at baseline. Started on IV heparin, and he will be admitted to the hospital for further management.     Brief Synopsis:     Patient tolerated the procedure well and is cleared for discharge. Discharge meds ordered per IR.  He will follow up with PCP and IR as opt.    Patient is to remain compliant with all discharge medications and instructions and to follow up as advised.   Patient encouraged to make healthy lifestyle and dietary changes.    Lace+ Score: 67  59-90 High Risk  29-58 Medium Risk  0-28   Low Risk       TCM Follow-Up Recommendation:  LACE > 58: High Risk of readmission after discharge from the hospital.  **Certification    Admission date was 2024.  Inpatient stay was shorter than expected.  Patient's Acute pain of right foot was initially serious enough to expect a more lengthy hospitalization but patient improved faster than expected.                 Procedures during  hospitalization:   R SFA Thrombectomy/PTA stent    Incidental or significant findings and recommendations (brief descriptions):  None    Lab/Test results pending at Discharge:   None    Consultants:  IR    Discharge Medication List:     Discharge Medications        START taking these medications        Instructions Prescription details   clopidogrel 75 MG Tabs  Commonly known as: Plavix      Take 1 tablet (75 mg total) by mouth daily.   Quantity: 30 tablet  Refills: 0     rivaroxaban 2.5 MG Tabs  Commonly known as: Xarelto      Take 1 tablet (2.5 mg total) by mouth 2 (two) times daily with meals.   Stop taking on: January 19, 2025  Quantity: 60 tablet  Refills: 0            CONTINUE taking these medications        Instructions Prescription details   acetaminophen 325 MG Tabs  Commonly known as: Tylenol      Take 1 tablet (325 mg total) by mouth every 6 (six) hours as needed for Pain.   Refills: 0     ALPRAZolam 0.5 MG Tabs  Commonly known as: Xanax      TAKE 1 TABLET(0.5 MG) BY MOUTH TWICE DAILY AS NEEDED FOR ANXIETY OR SLEEP   Quantity: 60 tablet  Refills: 5     aspirin 81 MG Tbec      Take 1 tablet (81 mg total) by mouth daily.   Quantity: 90 tablet  Refills: 3     furosemide 40 MG Tabs  Commonly known as: Lasix      Take 1 tablet (40 mg total) by mouth as needed (Swelling in the feet).   Refills: 0     glipiZIDE ER 10 MG Tb24  Commonly known as: Glucotrol XL      Take 1 tablet (10 mg total) by mouth daily.   Quantity: 90 tablet  Refills: 3     HYDROcodone-acetaminophen 5-325 MG Tabs  Commonly known as: Norco      Take 1 tablet by mouth every 8 (eight) hours as needed for Pain.   Refills: 0     lisinopril 20 MG Tabs  Commonly known as: Prinivil; Zestril      Take 1 tablet (20 mg total) by mouth 2 (two) times daily.   Quantity: 180 tablet  Refills: 3     metoprolol succinate  MG Tb24  Commonly known as: Toprol XL      Take 1 tablet (100 mg total) by mouth 2 (two) times daily.   Quantity: 180 tablet  Refills:  3     OneTouch Delica Lancets 33G Misc      Test BS once daily   Quantity: 100 each  Refills: 3     OneTouch Ultra Blue Strp      Check BS once daily1   Quantity: 100 strip  Refills: 3     spironolactone 25 MG Tabs  Commonly known as: Aldactone      TAKE 1 TABLET(25 MG) BY MOUTH DAILY   Quantity: 90 tablet  Refills: 3               Where to Get Your Medications        Information about where to get these medications is not yet available    Ask your nurse or doctor about these medications  clopidogrel 75 MG Tabs  rivaroxaban 2.5 MG Tabs         [place]    Follow-up appointment:   Aaron Helton MD  [address] [address]  [phone]    Follow up in 1 week(s)      Taiwo Greenfield MD  [address] [address]  [phone]    Follow up in 1 week(s)      Appointments for Next 30 Days 12/20/2024 - 1/19/2025        Date Arrival Time Visit Type Length Department Provider     1/14/2025  5:00 PM  [facility] US CAROTID DOP VASC [1519] 30 min [facility] US RM5    Patient Instructions:     Please arrive 15 minutes prior to your scheduled appointment time.    There are no eating or activity restrictions for this exam.        Location Instructions:     Your appointment will be at the [facility] located at [address] in [place], [place].&nbsp; For self-parking please park in the Green Lot. There is also  parking at the Main Entrance. Enter the door that says East Entrance. Inside the building walk to the right and check in with [facility]. The phone number for this location is [phone].  [facility] ([facility]) reserves the right to restrict and prohibit the use of video, recording, and photography devices while on the premises. In order to protect the safety and privacy of our patients and staff, no photography, videotaping, or audio recording is allowed in any [facility] department without prior authorization.    Because of  the nature of the Emergency Department/Immediate Care, please be advised of the possibility your appointment may be delayed.    Please bring your insurance card and photo ID. You will also need to bring your doctor's order unless your physician's office submitted the order electronically or faxed the order. Without the order, your test may be delayed or postponed.  Children: Children under the age of 12 must have another adult caregiver with them.  Please do not bring your child/children without a caregiver.  Because of the highly sensitive equipment and privacy of all our patients, children will not be permitted in the exam rooms, unless otherwise noted and in accordance with departmental policy.   PATIENT RESPONSIBILITY ESTIMATE  - (Estimate) We will provide you with your estimated remaining deductible and coinsurance balance for your services at the time of check in.  - (Payment) Please be aware that you may be asked for payment at the time of service.  Masks are optional for all patients and visitors, unless otherwise indicated.                      Vital signs:  Temp:  [97.4 °F (36.3 °C)-98 °F (36.7 °C)] 98 °F (36.7 °C)  Pulse:  [54-74] 74  Resp:  [16-20] 16  BP: (106-159)/() 126/59  SpO2:  [95 %-100 %] 95 %    Physical Exam:    Gen: NAD AO x3  Chest: good air entry CTABL  CVS: normal s1 and s2 RR  Abd: NABS soft NT ND  Neuro: CN 2-12 grossly intact  Ext: no edema in bilateral LE    -----------------------------------------------------------------------------------------------  PATIENT DISCHARGE INSTRUCTIONS: See electronic chart    Layla Valencia MD  Hospitalist    Time spent:  > 30 minutes    The 21st Century Cures Act makes medical notes like these available to patients in the interest of transparency. Please be advised this is a medical document. Medical documents are intended to carry relevant information, facts as evident, and the clinical opinion of the practitioner. The medical note is intended as  peer to peer communication and may appear blunt or direct. It is written in medical language and may contain abbreviations or verbiage that are unfamiliar.

## 2024-12-20 NOTE — PLAN OF CARE
Problem: Patient Centered Care  Goal: Patient preferences are identified and integrated in the patient's plan of care  Description: Interventions:  - What would you like us to know as we care for you?   - Provide timely, complete, and accurate information to patient/family  - Incorporate patient and family knowledge, values, beliefs, and cultural backgrounds into the planning and delivery of care  - Encourage patient/family to participate in care and decision-making at the level they choose  - Honor patient and family perspectives and choices  Outcome: Adequate for Discharge     Problem: Diabetes/Glucose Control  Goal: Glucose maintained within prescribed range  Description: INTERVENTIONS:  - Monitor Blood Glucose as ordered  - Assess for signs and symptoms of hyperglycemia and hypoglycemia  - Administer ordered medications to maintain glucose within target range  - Assess barriers to adequate nutritional intake and initiate nutrition consult as needed  - Instruct patient on self management of diabetes  Outcome: Adequate for Discharge     Problem: Patient/Family Goals  Goal: Patient/Family Long Term Goal  Description: Patient's Long Term Goal: go home    Interventions:  -follow MD orders  - pain management  -monitor vitals  - See additional Care Plan goals for specific interventions  Outcome: Adequate for Discharge  Goal: Patient/Family Short Term Goal  Description: Patient's Short Term Goal: To feel better    Interventions:   - monitor vitals  -pain management  - See additional Care Plan goals for specific interventions  Outcome: Adequate for Discharge     Problem: CARDIOVASCULAR - ADULT  Goal: Maintains optimal cardiac output and hemodynamic stability  Description: INTERVENTIONS:  - Monitor vital signs, rhythm, and trends  - Monitor for bleeding, hypotension and signs of decreased cardiac output  - Evaluate effectiveness of vasoactive medications to optimize hemodynamic stability  - Monitor arterial and/or  venous puncture sites for bleeding and/or hematoma  - Assess quality of pulses, skin color and temperature  - Assess for signs of decreased coronary artery perfusion - ex. Angina  - Evaluate fluid balance, assess for edema, trend weights  Outcome: Adequate for Discharge  Goal: Absence of cardiac arrhythmias or at baseline  Description: INTERVENTIONS:  - Continuous cardiac monitoring, monitor vital signs, obtain 12 lead EKG if indicated  - Evaluate effectiveness of antiarrhythmic and heart rate control medications as ordered  - Initiate emergency measures for life threatening arrhythmias  - Monitor electrolytes and administer replacement therapy as ordered  Outcome: Adequate for Discharge     Problem: PAIN - ADULT  Goal: Verbalizes/displays adequate comfort level or patient's stated pain goal  Description: INTERVENTIONS:  - Encourage pt to monitor pain and request assistance  - Assess pain using appropriate pain scale  - Administer analgesics based on type and severity of pain and evaluate response  - Implement non-pharmacological measures as appropriate and evaluate response  - Consider cultural and social influences on pain and pain management  - Manage/alleviate anxiety  - Utilize distraction and/or relaxation techniques  - Monitor for opioid side effects  - Notify MD/LIP if interventions unsuccessful or patient reports new pain  - Anticipate increased pain with activity and pre-medicate as appropriate  Outcome: Adequate for Discharge     Problem: SAFETY ADULT - FALL  Goal: Free from fall injury  Description: INTERVENTIONS:  - Assess pt frequently for physical needs  - Identify cognitive and physical deficits and behaviors that affect risk of falls.  - [place] fall precautions as indicated by assessment.  - Educate pt/family on patient safety including physical limitations  - Instruct pt to call for assistance with activity based on assessment  - Modify environment to reduce risk of injury  - Provide assistive  devices as appropriate  - Consider OT/PT consult to assist with strengthening/mobility  - Encourage toileting schedule  Outcome: Adequate for Discharge

## 2024-12-20 NOTE — BRIEF PROCEDURE NOTE
[facility]  part of [facility]  Procedure Note    Geovanny Prabhakar Patient Status:  Inpatient    1950 MRN [id number]   Location [facility] INTERVENTIONAL SUITES Attending Beryl Cabral MD   Hosp Day # 0 PCP Aaron Helton MD     Procedure: R LE arteriogram, percutaneous thrombectomy, SFA/popliteal PTA/stent    Pre-Procedure Diagnosis:  PAD, ischemic rest pain    Post-Procedure Diagnosis: PAD, ischemic rest pain    Anesthesia:  Sedation    Findings: thrombotic occlusion R SFA/popliteal cleared c percutaneous thrombectomy, and stenoses proximal SFA and proximal popiteal stented, c restoration of patency and strong Doppler signals distally    Specimens: 0    Blood Loss:  minimal      Complications:  None      Taiwo Greenfield MD  2024

## 2024-12-23 ENCOUNTER — TELEPHONE (OUTPATIENT)
Dept: INTERNAL MEDICINE CLINIC | Facility: CLINIC | Age: 74
End: 2024-12-23

## 2024-12-23 ENCOUNTER — PATIENT OUTREACH (OUTPATIENT)
Dept: CASE MANAGEMENT | Age: 74
End: 2024-12-23

## 2024-12-23 NOTE — PROGRESS NOTES
Transitional Care Management   Discharge Date: 24  Contact Date: 2024    Assessment:  TCM Initial Assessment    General:  Assessment completed with: Patient  Patient Subjective: Spoke with patient's wife Isabell and the patient for the TCM call. The patient was present during the time of the call with his wife and his wife placed the phone on his speaker. The patient reports the right foot pain has eased up a little bit since leaving the hospital. The patient reports the right foot is warm to the touch. He denies any cyanosis, worsened erythema, or worsened edema. His wife states that she is able to get a pedal pulse in the foot. She is a retired nurse and states that she has been helping to keep an eye on this. The patient denies fevers, chest pain, or shortness of breath. The patient reports the L groin area seems it is healing well. Patient states that he has a walker at home that he is using. The patient does report he has chronic pain in his right hip that he has experienced even prior to the hospitalization and is something he is also dealing with.  Chief Complaint: RLE ischemia s/p R SFA Thrombectomy/PTA stent  HTN  T2DM  CKD 3  Verify patient name and  with patient/ caregiver: Yes    Hospital Stay/Discharge:  Tell me what you understand of why you were in the hospital or emergency department: The patient states there was no blood flow into his foot anymore  Prior to leaving the hospital were your Discharge Instructions reviewed with you?: Yes  Did you receive a copy of your written Discharge Instructions?: Yes  What questions do you have about your Discharge Instructions?: Pain management  Do you feel better or worse since you left the hospital or emergency department?: Better    Follow - Up Appointment:  Do you have a follow-up appointment?: Yes  Date: 25  Physician: Taiwo Greenfield  Are there any barriers to getting to your follow-up appointment?: No    Home Health/DME:  Prior to leaving the  hospital was Home Health (HH) arranged for you?: N/A  Are HH needs identified by staff during the assessment?: No     Prior to leaving the hospital or emergency department was Durable Medical Equipment (DME), medical supplies, or infusions arranged for you?: N/A  Are DME/medical supply/infusions needs identified by staff during this assessment?: No     Medications/Diet:  Did any of your medications change, during or after your hospital stay or ED visit?: Yes  Do you have your new or updated medications?: Yes  Do you understand what your medications are for and possible side effects?: Yes  Are there any reasons that keep you from taking your medication as prescribed?: No  Any concerns about medication refills?: No    Were you given a different diet per your Discharge Instructions?: No     Questions/Concerns:  Do you have any questions or concerns that have not been discussed?: Yes         Nursing Interventions:    NCM reviewed antithrombotic therapy with the patient     Advised to avoid NSAIDS for pain.     NCM discussed signs and symptoms which would require the patient to return to the ED and the patient voiced understanding of this.    The patient is asking what else he can do for the right foot pain? The patient is taking Tylenol as needed. The patient also has Norco at home but states he has been using very sparingly. The patient is asking if applying heat or ice would be affective? [facility] called IR for further direction and spoke with Samanta. Samanta states she will check with Dr. Thomas regarding this and give me a call back. No other providers in office at this time. [facility] provided her direct contact information.     The patient reports IR appointment scheduled for 01/10/2024.     [facility] attempted to schedule an appointment with Dr. Helton however no availability within the recommended TCM time frame. A telephone encounter has been sent to the PCP office and the patient's wife is aware she will be contacted directly.      All d/c instructions reviewed with pt.  Reviewed when to call MD vs when to go to ER/call 911.  Educated pt on the importance of taking all meds as prescribed as well as close f/u with PCP/specialists.  Pt verbalized understanding and will contact office with any further questions or concerns.       Medication Review:   Current Outpatient Medications   Medication Sig Dispense Refill    clopidogrel 75 MG Oral Tab Take 1 tablet (75 mg total) by mouth daily. 30 tablet 0    rivaroxaban (XARELTO) 2.5 MG Oral Tab Take 1 tablet (2.5 mg total) by mouth 2 (two) times daily with meals. 60 tablet 0    ALPRAZOLAM 0.5 MG Oral Tab TAKE 1 TABLET(0.5 MG) BY MOUTH TWICE DAILY AS NEEDED FOR ANXIETY OR SLEEP (Patient taking differently: Take 1 tablet (0.5 mg total) by mouth at bedtime.) 60 tablet 5    SPIRONOLACTONE 25 MG Oral Tab TAKE 1 TABLET(25 MG) BY MOUTH DAILY 90 tablet 3    glipiZIDE ER 10 MG Oral Tablet 24 Hr Take 1 tablet (10 mg total) by mouth daily. 90 tablet 3    metoprolol succinate  MG Oral Tablet 24 Hr Take 1 tablet (100 mg total) by mouth 2 (two) times daily. 180 tablet 3    lisinopril 20 MG Oral Tab Take 1 tablet (20 mg total) by mouth 2 (two) times daily. 180 tablet 3    HYDROcodone-acetaminophen 5-325 MG Oral Tab Take 1 tablet by mouth every 8 (eight) hours as needed for Pain.      furosemide 40 MG Oral Tab Take 1 tablet (40 mg total) by mouth as needed (Swelling in the feet).      acetaminophen 325 MG Oral Tab Take 1 tablet (325 mg total) by mouth every 6 (six) hours as needed for Pain.      aspirin 81 MG Oral Tab EC Take 1 tablet (81 mg total) by mouth daily. 90 tablet 3    Glucose Blood (ONETOUCH ULTRA BLUE) In Vitro Strip Check BS once daily1 100 strip 3    ONETOUCH DELICA LANCETS 33G Does not apply Misc Test BS once daily 100 each 3     Did patient review medications using current pill bottles and not just a medication list?  Yes  Discharge medications reviewed/discussed/and reconciled against  outpatient medications with patient.  Any changes or updates to medications sent to primary care provider.  Patient Acknowledged      Follow-up Appointments:  Your appointments       Date & Time Appointment Department (Center)    Jan 14, 2025 5:00 PM CST US CAROTID DOPPLER with [facility] US 5 [facility] ([facility])    Please arrive 15 minutes prior to your scheduled appointment time.    There are no eating or activity restrictions for this exam.        Feb 06, 2025 2:30 PM CST Exam - Established with Aaron Helton MD [facility] ([facility])              [facility]  [address] [address]  [phone] [facility]  [address] [address]  [phone]            Transitional Care Clinic  Was TCC Ordered: No      Primary Care Provider (If no TCC appointment)  Does patient already have a PCP appointment scheduled? No  Nurse Care Manager Attempted to schedule PCP office TCM appointment with patient    Specialist  Does the patient have any other follow-up appointment(s) that need to be scheduled? No   -If yes: Nurse Care Manager reviewed upcoming specialist appointments with patient: Yes   -Does the patient need assistance scheduling appointment(s): No    CCM referral placed:  No

## 2024-12-23 NOTE — TELEPHONE ENCOUNTER
Patient's wife Isabell is calling patient was released from the hospital on 12/20.  Patient needs to schedule a hospital follow up appointment, there are no openings.  Can patient be added?    Please call wife Isabell [phone]

## 2024-12-23 NOTE — PROGRESS NOTES
UPDATE:   MARIAM received return call from Samanta THAKKAR with Interventional Radiology.    Samanta states she spoke directly with Dr. Bullock regading this. Samanta states per Dr. Bullock patient can do either heat or ice to the foot.   Per Saamnta if any worsening pain/or pain persists to call by Friday 12/27/2024 to speak with her.     MARIAM called patient's wife Isabell and advised her of the following and she verbalized agreement with the plan. I advised Isabell to contact IR if any other questions or concerns.

## 2024-12-23 NOTE — TELEPHONE ENCOUNTER
Spoke to patient for TCM today.  Patient does not have a TCM appointment scheduled at this time.      Nurse Care Manager attempted to schedule however no availability within the recommended TCM time frame. Only MD approval slots.     Per guidelines patient should  be seen within seven days by 12/27/2024.     Otherwise, last date for TCM: 01/03/2024     Clinical staff:  Please follow-up with patient and try to get them to schedule as patient would greatly benefit from a TCM appointment.  Thank you!    Future Appointments   Date Time Provider Department Center   1/14/2025  5:00 PM [facility] RM5 [facility] EM [facility]   2/6/2025  2:30 PM Aaron Helton MD [facility]

## 2024-12-27 ENCOUNTER — TELEPHONE (OUTPATIENT)
Dept: INTERVENTIONAL RADIOLOGY/VASCULAR | Facility: HOSPITAL | Age: 74
End: 2024-12-27

## 2024-12-27 NOTE — TELEPHONE ENCOUNTER
Spoke with patient's wife Isabell today who informed me that patient had blood in stool this morning.  No black stool, no blood in urine.  Patient is on Xarelto, Plavix and ASA post Right lower extremity arteriogram, percutaneous mechanical thrombectomy, right superficial femoral, popliteal angioplasty/stent.  Per Dr Greenfield patient to continue Plavix and ASA, discontinue Xarelto.  Patient was informed and undrestands instructions.

## 2024-12-29 NOTE — H&P
Mr. REDD is a 74 year old male PMHX coronary artery disease status post CABG, A-fib, type 2 diabetes, hypertension, hyperlipidemia, peripheral arterial disease, history of malignant neoplasm of colon, peripheral neuropathy who presents today for TCM posthospitalization follow-up    Of note, patient was hospitalized from 12/19 - 12/20.  He developed ischemic pain in the right foot and distal leg.  There is occlusion of superficial femoral artery, anterior and posterior tibial occlusions with collaterals to the peroneal trunk 12/313/4.  He presented to the ER for progressive pain and erythema of the right foot.  He was started on IV heparin status post right SFA thrombectomy with PTA stent.  He was discharged with Plavix, Xarelto and advised follow-up.    ***    Imaging:    Right foot x-ray 1/28/2024    Impression   CONCLUSION:  1. Macro right foot.  No destructive/erosive osseous changes are seen to suggest osteomyelitis radiographically.  2. Lesser incidental findings as above.         CTA lower extremities 2/6/2024  Narrative   PROCEDURE: CTA LOWER EXTREMITY BILAT (CPT=73706)     COMPARISON: None.     INDICATIONS: PAD (peripheral artery disease).     TECHNIQUE: CT images were obtained without and with non-ionic intravenous contrast material. Multi-planar reformatted and 3-D images were created with vessel analysis performed on an independent workstation.  Automated exposure control for dose reduction   was used. Adjustment of the mA and/or kV was done based on the patient's size. Use of iterative reconstruction technique for dose reduction was used.  Dose information is transmitted to the ACR (American College of Radiology) NRDR (National Radiology  Data Registry) which includes the Dose Index Registry.     FINDINGS:     RIGHT LEG    AORTO-ILIAC: There is severe atherosclerotic calcification of the common iliac artery extending into the right internal and external iliac arteries. Moderate grade stenosis of the  internal and external iliac arteries without occlusion.  FEMORAL ARTERY:   Moderate atherosclerotic calcification and noncalcified atheromatous plaques throughout the common femoral artery as well as within the superficial femoral artery throughout its entire course. High-grade stenosis and narrowing  involving the proximal superficial femoral artery extending into the mid and distal aspects of the SFA.  There is complete occlusion seen within the mid superficial femoral artery seen on series 5, image 115 extending into the distal superficial femoral  artery at the level of the adductor canal.  There is retrograde filling from intramuscular branches.  The total area of occlusion measures approximately 6.3 cm.  No aneurysm.  The profundus femoris artery is patent.    POPLITEAL ARTERY:   Moderate grade atherosclerotic calcification and noncalcified atheromatous plaque seen throughout the origin of the popliteal artery. No focal occlusion or aneurysmal dilation.  RUN-OFF ARTERIES:   Severe atherosclerotic calcification seen within the distal popliteal artery extending into the tibioperoneal trunk.  There is extensive atherosclerotic calcification seen throughout the posterior and anterior tibial arteries as well   as peroneal arteries which are markedly diminutive.  There is nondiagnostic evaluation of the calf vessels due to extensive atherosclerotic calcification.     LEFT LEG    AORTO-ILIAC: There is severe atherosclerotic calcification of the common iliac artery extending into the left internal and external iliac arteries. Moderate grade stenosis of the internal and external iliac arteries without occlusion.  FEMORAL ARTERY:   Moderate atherosclerotic calcification and noncalcified atheromatous plaques throughout the common femoral artery as well as within the superficial femoral artery throughout its entire course. High-grade stenosis and narrowing  involving the proximal superficial femoral artery extending into  the mid and distal aspects of the SFA.  No complete occlusion.    POPLITEAL ARTERY:   Moderate grade atherosclerotic calcification and noncalcified atheromatous plaque seen throughout the origin of the popliteal artery. No focal occlusion or aneurysmal dilation.  RUN-OFF ARTERIES: Severe atherosclerotic calcification seen within the distal popliteal artery extending into the tibioperoneal trunk.  There is extensive atherosclerotic calcification seen throughout the posterior and anterior tibial arteries as well  as peroneal arteries which are markedly diminutive.  There is nondiagnostic evaluation of the calf vessels due to extensive atherosclerotic calcification.     OTHER FINDINGS: There are bilateral inguinal hernias containing fat. No bowel herniation. Bladder wall thickening greater than what is expected for under distention, likely due to chronic bladder outlet obstruction from an enlarged prostate. No mass or  fluid collection.  No enlarged lymph nodes. Degenerative disc and facet disease is partially visualized in the lower lumbar spine. Degenerative changes are seen within the sacroiliac joints and pubic symphysis. Degenerative changes are seen in the  bilateral hips.  Degenerative changes are seen within the bilateral knees, ankle and feet.               Impression   CONCLUSION:     Extensive atherosclerotic calcification seen within the aortobiiliac system and throughout the bilateral lower extremities.     Approximately 6.3 cm area of complete occlusion involving the mid to distal right superficial femoral artery with retrograde filling.     Essentially nondiagnostic evaluation of the bilateral calf vessels due to extensive atherosclerotic calcification and diminutive nature.     Multifocal areas of high-grade stenosis within the left superficial femoral artery without complete occlusion.     Multiple other incidental findings as described in the body of the report.      X-ray right foot 3/29/2024         Impression   CONCLUSION:     Small cutaneous ulcer over the lateral aspect of the fifth digit, without radiographic evidence of acute osteomyelitis.  MRI of the forefoot is more sensitive for the detection of acute osteomyelitis.     Moderate first metatarsophalangeal joint osteoarthritis.      Duplex ultrasound 12/13/2024        Impression   CONCLUSION:  Diffuse vascular calcifications, with evidence of recurrent occlusions involving the superficial femoral, posterior tibial, and anterior tibial arteries.  There is collateralized flow into the peroneal, which is the dominant distal runoff  vessel.          Chronic nonhealing wound  Peripheral arterial disease  Ongoing over the course of months, initially patient declined any sort of invasive management  -Ultrasound duplex 1/24/2024: Focal occlusion of the mid superficial femoral artery with single-vessel runoff via anterior tibial artery and peroneal artery.  On the left there is greater than 70% aortoiliac inflow disease, main infrapopliteal runoff vessels are all occluded  - Status post right lower extremity angioplasty and stenting 2/7/2024 with Dr. Greenfield.  Noted to have chronic long segment SFA occlusion and distal tibial on the right lower extremity.  -Recently evaluated by podiatry, Dr. George last seen 3/29/2024: Status postdebridement, orthotics with inserts  - Status post right lower extremity arteriogram, superficial femoral, posterior tibial and anterior tibial angioplasty 5/22/2024.    -Duplex lower extremity 12/13/2024, evidence of recurrent occlusions of the superficial femoral and anterior tibial arteries.  - Had acute progressive worsening pain concerning for ischemia for which he was admitted for IV heparin, status post right SFA thrombectomy and PTA stent.  - Continued on Plavix, Xarelto  - Has follow-up with Dr. Greenfield 1/10/2025     CAD  Status post CABG  - Would benefit from use of aspirin  - On metoprolol 100 mg twice a day     Paroxysmal  A-fib  Status post Maze procedure.  - On metoprolol 100 mg twice a day  - Declining use of anticoagulation.    - Pacemaker in place     Type 2 diabetes:   ***    Current medications: Glipizide 10 mg daily  Eye exam: Denies any referrals for ophthalmology  Foot exam: Follows with podiatry,  -We will increase Accu-Cheks at home, may need refill on test strips     Hypertension  -Blood pressure today at goal  - Check blood pressures at home  - Continue with home metoprolol, lisinopril 20 mg daily, spironolactone 25 mg daily     Hyperlipidemia  - Last lipid panel with , total cholesterol 178  - Not currently on statin per patient preference     History of colon cancer  Status post hemicolectomy 2019.  - No new recurrences, patient declining any further colonoscopies  - CEA was undetectable     Peripheral neuropathy  - Can be contributing to lower extremity symptoms.  - Trial Lyrica 50 mg twice a day - not currently taking  -Vitamin B12 level at goal  - will try to supplement with vitamin B12, cyanocobalamin 1000 mcg once a day.     Spinal stenosis of lumbar region  Chronic intermittent low back pain  -Would recommend initial trial of conservative therapy:     -Acetaminophen 500-650 mg every 4-6 hours as needed for pain relief  -Ibuprofen 200-400 mg every 8 hours as needed for anti-inflammatory and pain relief  -For more severe pain, on Norco as needed  -Trial of physical therapy can be considered if home exercise regimen insufficient  - This has remained stable    ***

## 2024-12-29 NOTE — PATIENT INSTRUCTIONS
He was seen in clinic today for posthospitalization follow-up.  We are happy to hear that you are recovering from her hospitalization  - There was worsening occlusion of the arteries of the leg requiring intervention  - Continue with the blood thinner as recommended by Dr. Greenfield  -Continue with physical activity as tolerated to help promote blood flow    We discussed goal of stopping smoking, recommended cutting down nicotine from vaping.  As we are on 6 mg right now, when ready lets see if we can cut down to 4 mg after a few weeks.  Then we will try to cut down to 2 mg  - Try to avoid any environmental triggers or events that can worsen cravings  - We did discuss other options    I like the idea of using a 0 mg vape for the physical habitual effect.  But then we can also use nicotine patches to simulate the same effect    Can try medication such as Wellbutrin, Chantix.  These are used for nicotine cravings.  These are listed as antidepressants, antianxiety medications and may have that effect if stress is something we should try to manage further    -As we have not tolerated cholesterol medication in the past, lets have you see cardiology with Dr. Mckinney if you are a candidate for Repatha.    Lets check some nonfasting blood work to follow-up on your blood counts.  I suspect that this is hemorrhoidal bleeding  - Continue holding Xarelto until directed by Dr. Greenfield  -We will need to keep the aspirin and Plavix for now to maintain the stent  - Maintain good regular bowel movements, use stool softeners as needed    Will follow-up on the results of the ultrasound    Continue with Norco on an as needed basis    Return to clinic as scheduled in February.

## 2024-12-31 ENCOUNTER — OFFICE VISIT (OUTPATIENT)
Dept: INTERNAL MEDICINE CLINIC | Facility: CLINIC | Age: 74
End: 2024-12-31

## 2024-12-31 VITALS
BODY MASS INDEX: 28.59 KG/M2 | WEIGHT: 180 LBS | HEART RATE: 53 BPM | HEIGHT: 66.5 IN | OXYGEN SATURATION: 98 % | SYSTOLIC BLOOD PRESSURE: 110 MMHG | TEMPERATURE: 98 F | DIASTOLIC BLOOD PRESSURE: 64 MMHG

## 2024-12-31 DIAGNOSIS — K92.1 HEMATOCHEZIA: ICD-10-CM

## 2024-12-31 DIAGNOSIS — I25.10 CORONARY ARTERY DISEASE INVOLVING NATIVE CORONARY ARTERY OF NATIVE HEART WITHOUT ANGINA PECTORIS: ICD-10-CM

## 2024-12-31 DIAGNOSIS — I48.0 PAROXYSMAL ATRIAL FIBRILLATION (HCC): ICD-10-CM

## 2024-12-31 DIAGNOSIS — Z09 HOSPITAL DISCHARGE FOLLOW-UP: Primary | ICD-10-CM

## 2024-12-31 DIAGNOSIS — E11.9 TYPE 2 DIABETES MELLITUS WITHOUT COMPLICATION, WITHOUT LONG-TERM CURRENT USE OF INSULIN (HCC): ICD-10-CM

## 2024-12-31 DIAGNOSIS — I73.9 PAD (PERIPHERAL ARTERY DISEASE) (HCC): ICD-10-CM

## 2024-12-31 DIAGNOSIS — M48.061 SPINAL STENOSIS OF LUMBAR REGION, UNSPECIFIED WHETHER NEUROGENIC CLAUDICATION PRESENT: ICD-10-CM

## 2024-12-31 DIAGNOSIS — I10 ESSENTIAL HYPERTENSION WITH GOAL BLOOD PRESSURE LESS THAN 140/90: ICD-10-CM

## 2024-12-31 DIAGNOSIS — E78.2 MIXED HYPERLIPIDEMIA: ICD-10-CM

## 2024-12-31 DIAGNOSIS — G62.9 PERIPHERAL POLYNEUROPATHY: ICD-10-CM

## 2024-12-31 PROCEDURE — 99495 TRANSJ CARE MGMT MOD F2F 14D: CPT | Performed by: INTERNAL MEDICINE

## 2024-12-31 RX ORDER — HYDROCODONE BITARTRATE AND ACETAMINOPHEN 5; 325 MG/1; MG/1
1 TABLET ORAL EVERY 8 HOURS PRN
Qty: 60 TABLET | Refills: 0 | Status: SHIPPED | OUTPATIENT
Start: 2024-12-31 | End: 2025-01-02

## 2024-12-31 NOTE — PROGRESS NOTES
Subjective:   Geovanny Prabhakar is a 74 year old male who presents for hospital follow up.   He was discharged from [facility] to Home or Self Care  Admission Date: 12/19/24   Discharge Date: 12/20/24  Hospital Discharge Diagnosis: Acute limb ischemia, PAD    Interactive contact within 2 business days post discharge first initiated on Date: 12/23/2024    I accessed [facility] and/or [employer] Everywhere and personally reviewed the following for the recent hospitalization: provider notes, consults, summaries, labs and other test results and the pertinent findings are documented below.     HPI:   Mr. PRABHAKAR is a 74 year old male PMHX coronary artery disease status post CABG, A-fib, type 2 diabetes, hypertension, hyperlipidemia, peripheral arterial disease, history of malignant neoplasm of colon, peripheral neuropathy who presents today for TCM posthospitalization follow-up     Of note, patient was hospitalized from 12/19 - 12/20.  He developed ischemic pain in the right foot and distal leg.  There is occlusion of superficial femoral artery, anterior and posterior tibial occlusions with collaterals to the peroneal trunk.  He presented to the ER for progressive pain and erythema of the right foot.  He was started on IV heparin status post right SFA thrombectomy with PTA stent.  He was discharged with Plavix, Xarelto and advised follow-up.     Pain is better. Color comes and goes. He had acute pain along the whole leg, ankle, foot and R thigh. When he got home afterwards, he was still in a lot of pain. Xarelto, plavix and aspirin in the meantime. He did have rectal bleeding the day after Darnell. Xarelto was held. The bleeding has not completely stopped. Has had hemohrroids before. No hematuria or melena. He is having regular bowel movements. He does have at least 1 per day.     Tried chewing tobacco. Switched from 2 packs of tobacco to vaping. Down from 24 mg to 6 mg.  Wife Isabell trying to encourage tobacco cessation.  Geovanny  admits that this is difficult as he has been trying to stop smoking previously.      History/Other:   Current Medications:  Medication Reconciliation:  I am aware of an inpatient discharge within the last 30 days.  The discharge medication list has been reconciled with the patient's current medication list and reviewed by me. See medication list for additions of new medication, and changes to current doses of medications and discontinued medications.  No outpatient medications have been marked as taking for the 12/31/24 encounter (Office Visit) with Aaron Helton MD.       Review of Systems:  GENERAL: weight stable, energy stable, no sweating  SKIN: denies any unusual skin lesions  EYES: denies blurred vision or double vision  HEENT: denies nasal congestion, sinus pain or ST  LUNGS: denies shortness of breath with exertion  CARDIOVASCULAR: denies chest pain on exertion or palpitations  GI: denies abdominal pain, denies heartburn, denies diarrhea; + hematochezia  MUSCULOSKELETAL: denies pain, normal range of motion of extremities  NEURO: denies headaches, denies dizziness, denies weakness  PSYCHE: denies depression or anxiety  HEMATOLOGIC: denies hx of anemia, or bruising, denies bleeding  ENDOCRINE: denies thyroid history  ALL/ASTHMA: denies hx of allergy or asthma    Objective:   No LMP for male patient.  Estimated body mass index is 28.26 kg/m² as calculated from the following:    Height as of 12/19/24: 5' 6\" (1.676 m).    Weight as of 12/20/24: 175 lb 1.6 oz (79.4 kg).   There were no vitals taken for this visit.   GENERAL: well developed, well nourished, in no apparent distress  SKIN: + Right lateral foot with callused, old wound  + Right big toe with minor laceration, old/healing  HEENT: atraumatic, normocephalic, ears and throat are clear  EYES: PERRLA, EOMI, conjunctiva are clear  NECK: supple, no adenopathy, no bruits  CHEST: no chest tenderness  LUNGS: clear to auscultation  CARDIO: RRR without murmur  GI:  good BS's, no masses, HSM or tenderness  MUSCULOSKELETAL: back is not tender, FROM of the extremities  EXTREMITIES: + Nonpalpable DP/PT pulses right lower extremity and left lower extremity  NEURO: Oriented times three, cranial nerves are intact, motor and sensory are grossly intact    Recent Results (from the past 8760 hours)   CBC With Differential With Platelet    Collection Time: 12/20/24  4:31 AM   Result Value Ref Range    WBC 7.0 4.0 - 11.0 x10(3) uL    RBC 4.41 3.80 - 5.80 x10(6)uL    HGB 12.7 (L) 13.0 - 17.5 g/dL    HCT 36.9 (L) 39.0 - 53.0 %    MCV 83.7 80.0 - 100.0 fL    MCH 28.8 26.0 - 34.0 pg    MCHC 34.4 31.0 - 37.0 g/dL    RDW-SD 43.5 35.1 - 46.3 fL    RDW 14.1 11.0 - 15.0 %    .0 (L) 150.0 - 450.0 10(3)uL    Immature Platelet Fraction 5.7 0.0 - 7.0 %    Neutrophil Absolute Prelim 5.73 1.50 - 7.70 x10 (3) uL    Neutrophil Absolute 5.73 1.50 - 7.70 x10(3) uL    Lymphocyte Absolute 0.42 (L) 1.00 - 4.00 x10(3) uL    Monocyte Absolute 0.58 0.10 - 1.00 x10(3) uL    Eosinophil Absolute 0.16 0.00 - 0.70 x10(3) uL    Basophil Absolute 0.07 0.00 - 0.20 x10(3) uL    Immature Granulocyte Absolute 0.02 0.00 - 1.00 x10(3) uL    Neutrophil % 82.1 %    Lymphocyte % 6.0 %    Monocyte % 8.3 %    Eosinophil % 2.3 %    Basophil % 1.0 %    Immature Granulocyte % 0.3 %     *Note: Due to a large number of results and/or encounters for the requested time period, some results have not been displayed. A complete set of results can be found in Results Review.     Recent Results (from the past 8760 hours)   Basic Metabolic Panel (8)    Collection Time: 12/20/24  4:31 AM   Result Value Ref Range    Glucose 202 (H) 70 - 99 mg/dL    Sodium 141 136 - 145 mmol/L    Potassium 4.6 3.5 - 5.1 mmol/L    Chloride 109 98 - 112 mmol/L    CO2 24.0 21.0 - 32.0 mmol/L    Anion Gap 8 0 - 18 mmol/L    BUN 19 9 - 23 mg/dL    Creatinine 1.42 (H) 0.70 - 1.30 mg/dL    BUN/CREA Ratio 13.4 10.0 - 20.0    Calcium, Total 9.8 8.7 - 10.4 mg/dL     Calculated Osmolality 300 (H) 275 - 295 mOsm/kg    eGFR-Cr 53 (L) >=60 mL/min/1.73m2     *Note: Due to a large number of results and/or encounters for the requested time period, some results have not been displayed. A complete set of results can be found in Results Review.       Imaging:     Right foot x-ray 1/28/2024    Impression   CONCLUSION:  1. Macro right foot.  No destructive/erosive osseous changes are seen to suggest osteomyelitis radiographically.  2. Lesser incidental findings as above.         CTA lower extremities 2/6/2024  Narrative   PROCEDURE: CTA LOWER EXTREMITY BILAT (CPT=73706)     COMPARISON: None.     INDICATIONS: PAD (peripheral artery disease).     TECHNIQUE: CT images were obtained without and with non-ionic intravenous contrast material. Multi-planar reformatted and 3-D images were created with vessel analysis performed on an independent workstation.  Automated exposure control for dose reduction   was used. Adjustment of the mA and/or kV was done based on the patient's size. Use of iterative reconstruction technique for dose reduction was used.  Dose information is transmitted to the ACR (American College of Radiology) NRDR (National Radiology  Data Registry) which includes the Dose Index Registry.     FINDINGS:     RIGHT LEG    AORTO-ILIAC: There is severe atherosclerotic calcification of the common iliac artery extending into the right internal and external iliac arteries. Moderate grade stenosis of the internal and external iliac arteries without occlusion.  FEMORAL ARTERY:   Moderate atherosclerotic calcification and noncalcified atheromatous plaques throughout the common femoral artery as well as within the superficial femoral artery throughout its entire course. High-grade stenosis and narrowing  involving the proximal superficial femoral artery extending into the mid and distal aspects of the SFA.  There is complete occlusion seen within the mid superficial femoral artery seen on  series 5, image 115 extending into the distal superficial femoral  artery at the level of the adductor canal.  There is retrograde filling from intramuscular branches.  The total area of occlusion measures approximately 6.3 cm.  No aneurysm.  The profundus femoris artery is patent.    POPLITEAL ARTERY:   Moderate grade atherosclerotic calcification and noncalcified atheromatous plaque seen throughout the origin of the popliteal artery. No focal occlusion or aneurysmal dilation.  RUN-OFF ARTERIES:   Severe atherosclerotic calcification seen within the distal popliteal artery extending into the tibioperoneal trunk.  There is extensive atherosclerotic calcification seen throughout the posterior and anterior tibial arteries as well   as peroneal arteries which are markedly diminutive.  There is nondiagnostic evaluation of the calf vessels due to extensive atherosclerotic calcification.     LEFT LEG    AORTO-ILIAC: There is severe atherosclerotic calcification of the common iliac artery extending into the left internal and external iliac arteries. Moderate grade stenosis of the internal and external iliac arteries without occlusion.  FEMORAL ARTERY:   Moderate atherosclerotic calcification and noncalcified atheromatous plaques throughout the common femoral artery as well as within the superficial femoral artery throughout its entire course. High-grade stenosis and narrowing  involving the proximal superficial femoral artery extending into the mid and distal aspects of the SFA.  No complete occlusion.    POPLITEAL ARTERY:   Moderate grade atherosclerotic calcification and noncalcified atheromatous plaque seen throughout the origin of the popliteal artery. No focal occlusion or aneurysmal dilation.  RUN-OFF ARTERIES: Severe atherosclerotic calcification seen within the distal popliteal artery extending into the tibioperoneal trunk.  There is extensive atherosclerotic calcification seen throughout the posterior and anterior  tibial arteries as well  as peroneal arteries which are markedly diminutive.  There is nondiagnostic evaluation of the calf vessels due to extensive atherosclerotic calcification.     OTHER FINDINGS: There are bilateral inguinal hernias containing fat. No bowel herniation. Bladder wall thickening greater than what is expected for under distention, likely due to chronic bladder outlet obstruction from an enlarged prostate. No mass or  fluid collection.  No enlarged lymph nodes. Degenerative disc and facet disease is partially visualized in the lower lumbar spine. Degenerative changes are seen within the sacroiliac joints and pubic symphysis. Degenerative changes are seen in the  bilateral hips.  Degenerative changes are seen within the bilateral knees, ankle and feet.               Impression   CONCLUSION:     Extensive atherosclerotic calcification seen within the aortobiiliac system and throughout the bilateral lower extremities.     Approximately 6.3 cm area of complete occlusion involving the mid to distal right superficial femoral artery with retrograde filling.     Essentially nondiagnostic evaluation of the bilateral calf vessels due to extensive atherosclerotic calcification and diminutive nature.     Multifocal areas of high-grade stenosis within the left superficial femoral artery without complete occlusion.     Multiple other incidental findings as described in the body of the report.      X-ray right foot 3/29/2024        Impression   CONCLUSION:     Small cutaneous ulcer over the lateral aspect of the fifth digit, without radiographic evidence of acute osteomyelitis.  MRI of the forefoot is more sensitive for the detection of acute osteomyelitis.     Moderate first metatarsophalangeal joint osteoarthritis.      Duplex ultrasound 12/13/2024        Impression   CONCLUSION:  Diffuse vascular calcifications, with evidence of recurrent occlusions involving the superficial femoral, posterior tibial, and  anterior tibial arteries.  There is collateralized flow into the peroneal, which is the dominant distal runoff  vessel.          Assessment & Plan:   1. Hospital discharge follow-up (Primary)  2. PAD (peripheral artery disease) (formerly [facility])  3. Spinal stenosis of lumbar region, unspecified whether neurogenic claudication present  4. Peripheral polyneuropathy  5. Paroxysmal atrial fibrillation (formerly [facility])  6. Type 2 diabetes mellitus without complication, without long-term current use of insulin (formerly [facility])  7. Essential hypertension with goal blood pressure less than 140/90  8. Coronary artery disease involving native coronary artery of native heart without angina pectoris    Chronic nonhealing wound  Peripheral arterial disease  Ongoing over the course of months, initially patient declined any sort of invasive management  -Ultrasound duplex 1/24/2024: Focal occlusion of the mid superficial femoral artery with single-vessel runoff via anterior tibial artery and peroneal artery.  On the left there is greater than 70% aortoiliac inflow disease, main infrapopliteal runoff vessels are all occluded  - Status post right lower extremity angioplasty and stenting 2/7/2024 with Dr. Greenfield.  Noted to have chronic long segment SFA occlusion and distal tibial on the right lower extremity.  -Recently evaluated by podiatry, Dr. George last seen 3/29/2024: Status postdebridement, orthotics with inserts  - Status post right lower extremity arteriogram, superficial femoral, posterior tibial and anterior tibial angioplasty 5/22/2024.    -Duplex lower extremity 12/13/2024, evidence of recurrent occlusions of the superficial femoral and anterior tibial arteries.  - Had acute progressive worsening pain concerning for ischemia for which he was admitted for IV heparin, status post right SFA thrombectomy and PTA stent.  - Continued on Plavix, Aspirin; Xarelto held due to BRBPR  - Has follow-up with Dr. Greenfield 1/10/2025    Hematochezia  - Likely due to  anticoagulation.  Xarelto being held per IR  -Will check a CBC, remains otherwise hemodynamically stable  -Improving.  Monitor for any more bleeding episodes     CAD  Status post CABG  - On ASA/Plavix  - On metoprolol 100 mg twice a day     Paroxysmal A-fib  Status post Maze procedure.  - On metoprolol 100 mg twice a day  - Xarelto held    - Pacemaker in place     Type 2 diabetes:   Recent A1c:   HEMOGLOBIN A1C (%)   Date Value   10/18/2023 6.8 (A)     HgbA1C (%)   Date Value   11/12/2024 7.8 (H)     Recent urine microalbumin: No components found for: \"URINEMICROALBUMIN\"       Current medications: Glipizide 10 mg daily  Eye exam: Denies any referrals for ophthalmology  Foot exam: Follows with podiatry,  -We will increase Accu-Cheks at home, may need refill on test strips     Hypertension  -Blood pressure today at goal  - Check blood pressures at home  - Continue with home metoprolol, lisinopril 20 mg daily, spironolactone 25 mg daily     Hyperlipidemia  - Last lipid panel with , total cholesterol 178  - Not currently on statin per patient preference; he has been tolerant of statin the past  - Aware for the need of cholesterol medication.  May be candidate for Repatha.  Open to seeing Dr. Mckinney need to go potty     History of colon cancer  Status post hemicolectomy 2019.  - No new recurrences, patient declining any further colonoscopies  - CEA was undetectable     Peripheral neuropathy  - Can be contributing to lower extremity symptoms.  - Trial Lyrica 50 mg twice a day - not currently taking  -Vitamin B12 level at goal  - will try to supplement with vitamin B12, cyanocobalamin 1000 mcg once a day.     Spinal stenosis of lumbar region  Chronic intermittent low back pain  -Would recommend initial trial of conservative therapy:     -Acetaminophen 500-650 mg every 4-6 hours as needed for pain relief  -Ibuprofen 200-400 mg every 8 hours as needed for anti-inflammatory and pain relief  -For more severe pain, on  Norco as needed  -Trial of physical therapy can be considered if home exercise regimen insufficient  - This has remained stable    Active tobacco use  We discussed goal of stopping smoking, recommended cutting down vaping every 1-2 weeks until we hit 0.  Discussed medications to decrease cravings but can be considered in the future.  Discussed the importance of stopping smoking to reduce the risk of lung cancer, COPD, heart attack, stroke, other potential complications.  -Admits that has difficulties with nicotine dependence  - Has cut down from 2 packs of cigarettes to vaping.  Aware for the need to stop tobacco altogether given severe peripheral arterial disease  - Discussed Wellbutrin, Chantix  - We discussed conservative measures such as 0 mg vaping with use of nicotine patch  - Try and avoid any triggers        No follow-ups on file.

## 2025-01-02 DIAGNOSIS — I73.9 PAD (PERIPHERAL ARTERY DISEASE) (HCC): ICD-10-CM

## 2025-01-02 DIAGNOSIS — M48.061 SPINAL STENOSIS OF LUMBAR REGION, UNSPECIFIED WHETHER NEUROGENIC CLAUDICATION PRESENT: ICD-10-CM

## 2025-01-02 RX ORDER — ACETAMINOPHEN AND CODEINE PHOSPHATE 300; 30 MG/1; MG/1
1 TABLET ORAL EVERY 6 HOURS PRN
Qty: 60 TABLET | Refills: 0 | Status: SHIPPED | OUTPATIENT
Start: 2025-01-02

## 2025-01-02 RX ORDER — HYDROCODONE BITARTRATE AND ACETAMINOPHEN 5; 325 MG/1; MG/1
1 TABLET ORAL EVERY 8 HOURS PRN
Qty: 60 TABLET | Refills: 0 | Status: CANCELLED | OUTPATIENT
Start: 2025-01-02

## 2025-01-02 NOTE — TELEPHONE ENCOUNTER
Shravan faxed drug change request.    Message: Drug not covered by patient plan. The preferred alternative is Acetaminophencodeine, Hydrocodoneibuprofen. Plan help desk number. Please call/fax the pharmacy to change medication along with strength, directions, quantity and refills.

## 2025-01-02 NOTE — TELEPHONE ENCOUNTER
Please notify patient that we have to switch the prescription as Norco 5 is no longer covered.  We will switch to Tylenol 3 and can take in place of the Norco.  Based on insurance coverage.  Notify us if there is any issues.

## 2025-01-10 ENCOUNTER — LAB ENCOUNTER (OUTPATIENT)
Dept: LAB | Facility: HOSPITAL | Age: 75
End: 2025-01-10
Attending: INTERNAL MEDICINE
Payer: MEDICARE

## 2025-01-10 DIAGNOSIS — Z09 HOSPITAL DISCHARGE FOLLOW-UP: ICD-10-CM

## 2025-01-10 DIAGNOSIS — E11.9 TYPE 2 DIABETES MELLITUS WITHOUT COMPLICATION, WITHOUT LONG-TERM CURRENT USE OF INSULIN (HCC): ICD-10-CM

## 2025-01-10 DIAGNOSIS — K92.1 HEMATOCHEZIA: ICD-10-CM

## 2025-01-10 LAB
ANION GAP SERPL CALC-SCNC: 7 MMOL/L (ref 0–18)
BASOPHILS # BLD AUTO: 0.09 X10(3) UL (ref 0–0.2)
BASOPHILS NFR BLD AUTO: 1.1 %
BUN BLD-MCNC: 46 MG/DL (ref 9–23)
BUN/CREAT SERPL: 23.7 (ref 10–20)
CALCIUM BLD-MCNC: 10.1 MG/DL (ref 8.7–10.4)
CHLORIDE SERPL-SCNC: 106 MMOL/L (ref 98–112)
CO2 SERPL-SCNC: 25 MMOL/L (ref 21–32)
CREAT BLD-MCNC: 1.94 MG/DL
DEPRECATED RDW RBC AUTO: 43.2 FL (ref 35.1–46.3)
EGFRCR SERPLBLD CKD-EPI 2021: 36 ML/MIN/1.73M2 (ref 60–?)
EOSINOPHIL # BLD AUTO: 0.28 X10(3) UL (ref 0–0.7)
EOSINOPHIL NFR BLD AUTO: 3.3 %
ERYTHROCYTE [DISTWIDTH] IN BLOOD BY AUTOMATED COUNT: 13.7 % (ref 11–15)
FASTING STATUS PATIENT QL REPORTED: YES
GLUCOSE BLD-MCNC: 168 MG/DL (ref 70–99)
HCT VFR BLD AUTO: 40.7 %
HGB BLD-MCNC: 14.1 G/DL
IMM GRANULOCYTES # BLD AUTO: 0.04 X10(3) UL (ref 0–1)
IMM GRANULOCYTES NFR BLD: 0.5 %
LYMPHOCYTES # BLD AUTO: 1.15 X10(3) UL (ref 1–4)
LYMPHOCYTES NFR BLD AUTO: 13.6 %
MCH RBC QN AUTO: 29.9 PG (ref 26–34)
MCHC RBC AUTO-ENTMCNC: 34.6 G/DL (ref 31–37)
MCV RBC AUTO: 86.4 FL
MONOCYTES # BLD AUTO: 0.69 X10(3) UL (ref 0.1–1)
MONOCYTES NFR BLD AUTO: 8.2 %
NEUTROPHILS # BLD AUTO: 6.19 X10 (3) UL (ref 1.5–7.7)
NEUTROPHILS # BLD AUTO: 6.19 X10(3) UL (ref 1.5–7.7)
NEUTROPHILS NFR BLD AUTO: 73.3 %
OSMOLALITY SERPL CALC.SUM OF ELEC: 302 MOSM/KG (ref 275–295)
PLATELET # BLD AUTO: 166 10(3)UL (ref 150–450)
POTASSIUM SERPL-SCNC: 5.7 MMOL/L (ref 3.5–5.1)
RBC # BLD AUTO: 4.71 X10(6)UL
SODIUM SERPL-SCNC: 138 MMOL/L (ref 136–145)
WBC # BLD AUTO: 8.4 X10(3) UL (ref 4–11)

## 2025-01-10 PROCEDURE — 85025 COMPLETE CBC W/AUTO DIFF WBC: CPT

## 2025-01-10 PROCEDURE — 36415 COLL VENOUS BLD VENIPUNCTURE: CPT

## 2025-01-10 PROCEDURE — 80048 BASIC METABOLIC PNL TOTAL CA: CPT

## 2025-01-10 RX ORDER — CLOPIDOGREL BISULFATE 75 MG/1
75 TABLET ORAL DAILY
Qty: 90 TABLET | Refills: 3 | Status: SHIPPED | OUTPATIENT
Start: 2025-01-10

## 2025-01-13 ENCOUNTER — TELEPHONE (OUTPATIENT)
Dept: INTERNAL MEDICINE CLINIC | Facility: CLINIC | Age: 75
End: 2025-01-13

## 2025-01-13 DIAGNOSIS — E87.5 HYPERKALEMIA: Primary | ICD-10-CM

## 2025-01-13 NOTE — TELEPHONE ENCOUNTER
Please notify the patient reviewed the blood work from 1/10.  It did show elevated potassium levels, but this may have been due to the fasting state and mild dehydration.  I would like him to repeat another chemistry panel nonfasting, with plenty of water.  Should also try to cut down slightly on high potassium foods such as bananas, potatoes prior to the blood draw.  Should obtain within the next few days

## 2025-01-14 ENCOUNTER — HOSPITAL ENCOUNTER (OUTPATIENT)
Dept: ULTRASOUND IMAGING | Facility: HOSPITAL | Age: 75
Discharge: HOME | End: 2025-01-14
Attending: RADIOLOGY
Payer: MEDICARE

## 2025-01-14 DIAGNOSIS — R09.89 BRUIT: ICD-10-CM

## 2025-01-14 PROCEDURE — 93880 EXTRACRANIAL BILAT STUDY: CPT | Performed by: RADIOLOGY

## 2025-01-17 ENCOUNTER — TELEPHONE (OUTPATIENT)
Dept: INTERNAL MEDICINE CLINIC | Facility: CLINIC | Age: 75
End: 2025-01-17

## 2025-01-17 ENCOUNTER — LAB ENCOUNTER (OUTPATIENT)
Dept: LAB | Age: 75
End: 2025-01-17
Attending: INTERNAL MEDICINE
Payer: MEDICARE

## 2025-01-17 DIAGNOSIS — E87.5 HYPERKALEMIA: ICD-10-CM

## 2025-01-17 DIAGNOSIS — E87.5 HYPERKALEMIA: Primary | ICD-10-CM

## 2025-01-17 LAB
ANION GAP SERPL CALC-SCNC: 9 MMOL/L (ref 0–18)
BUN BLD-MCNC: 36 MG/DL (ref 9–23)
BUN/CREAT SERPL: 20.2 (ref 10–20)
CALCIUM BLD-MCNC: 9.8 MG/DL (ref 8.7–10.4)
CHLORIDE SERPL-SCNC: 110 MMOL/L (ref 98–112)
CO2 SERPL-SCNC: 21 MMOL/L (ref 21–32)
CREAT BLD-MCNC: 1.78 MG/DL
EGFRCR SERPLBLD CKD-EPI 2021: 40 ML/MIN/1.73M2 (ref 60–?)
FASTING STATUS PATIENT QL REPORTED: YES
GLUCOSE BLD-MCNC: 137 MG/DL (ref 70–99)
OSMOLALITY SERPL CALC.SUM OF ELEC: 300 MOSM/KG (ref 275–295)
POTASSIUM SERPL-SCNC: 6.6 MMOL/L (ref 3.5–5.1)
SODIUM SERPL-SCNC: 140 MMOL/L (ref 136–145)

## 2025-01-17 PROCEDURE — 80048 BASIC METABOLIC PNL TOTAL CA: CPT

## 2025-01-17 PROCEDURE — 36415 COLL VENOUS BLD VENIPUNCTURE: CPT

## 2025-01-20 ENCOUNTER — LAB ENCOUNTER (OUTPATIENT)
Dept: LAB | Age: 75
End: 2025-01-20
Attending: INTERNAL MEDICINE
Payer: MEDICARE

## 2025-01-20 DIAGNOSIS — E87.5 HYPERKALEMIA: ICD-10-CM

## 2025-01-20 LAB
ANION GAP SERPL CALC-SCNC: 9 MMOL/L (ref 0–18)
BUN BLD-MCNC: 29 MG/DL (ref 9–23)
BUN/CREAT SERPL: 17.7 (ref 10–20)
CALCIUM BLD-MCNC: 9.5 MG/DL (ref 8.7–10.4)
CHLORIDE SERPL-SCNC: 110 MMOL/L (ref 98–112)
CO2 SERPL-SCNC: 23 MMOL/L (ref 21–32)
CREAT BLD-MCNC: 1.64 MG/DL
EGFRCR SERPLBLD CKD-EPI 2021: 44 ML/MIN/1.73M2 (ref 60–?)
FASTING STATUS PATIENT QL REPORTED: NO
GLUCOSE BLD-MCNC: 288 MG/DL (ref 70–99)
OSMOLALITY SERPL CALC.SUM OF ELEC: 310 MOSM/KG (ref 275–295)
POTASSIUM SERPL-SCNC: 5.1 MMOL/L (ref 3.5–5.1)
SODIUM SERPL-SCNC: 142 MMOL/L (ref 136–145)

## 2025-01-20 PROCEDURE — 80048 BASIC METABOLIC PNL TOTAL CA: CPT

## 2025-01-20 PROCEDURE — 36415 COLL VENOUS BLD VENIPUNCTURE: CPT
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
## 2025-02-05 NOTE — PATIENT INSTRUCTIONS
You were seen in clinic today for follow-up.  Today, we did review your most recent set of blood  - We should repeat your potassium levels to ensure that these remain well-controlled  - We will periodically check your blood pressures, notify us if increasing at home  - Please continue working on bring the sugar levels down:    Diabetes can be better controlled with a goal less than 7.0  - Please continue trying to cut down on carbohydrates not just in sweets but also in bread/grain/rice/breads  - Targeting weight loss over time can help by optimizing nutrition, targeting exercise as tolerated  - Maintain annual eye examination  - If no improvement, may need to consider adjusting the medication regimen further    Lets continue working getting the cholesterol levels down.  Hopefully the pravastatin is tolerable  - If no improvement, may need cardiology evaluation with use of Repatha  - There is cholesterol buildup in some of the arteries of the neck.  However no immediate intervention needed at this time.  Lets try to manage this medically      For diuresis, if the potassium level remains normal we will proceed with furosemide 40 mg once a day with Aldactone 25 mg daily    However the potassium is elevated, we may have to discontinue spironolactone altogether.  We would then plan on furosemide 80 mg once a day as directed and coordinated by our clinic    In either case, lets maintain the furosemide 40 mg once a day over the next 5 days to bring the swelling down.    We discussed goal of stopping smoking, recommended cutting down by 1-2 cigarettes every 1-2 weeks until we hit 0.  Discussed medications to decrease cravings but can be considered in the future.  Discussed the importance of stopping smoking to reduce the risk of lung cancer, COPD, heart attack, stroke, other potential complications.  -This will help with adequate blood flow of the legs.  You have made great progress with decreasing vaping.-     Continue  following with  of interventional radiology.    Let's switch from Zyrtec -> Xzyal (levocetirizine) once a day. Let's give this at least 72 hours of a trial first.   -Alternative medication would be montelukast/Singulair    Return to clinic in 3 months for Medicare physical examination

## 2025-02-06 ENCOUNTER — LAB ENCOUNTER (OUTPATIENT)
Dept: LAB | Age: 75
End: 2025-02-06
Attending: INTERNAL MEDICINE
Payer: MEDICARE

## 2025-02-06 ENCOUNTER — OFFICE VISIT (OUTPATIENT)
Dept: INTERNAL MEDICINE CLINIC | Facility: CLINIC | Age: 75
End: 2025-02-06

## 2025-02-06 VITALS
OXYGEN SATURATION: 99 % | BODY MASS INDEX: 29.54 KG/M2 | SYSTOLIC BLOOD PRESSURE: 110 MMHG | TEMPERATURE: 98 F | DIASTOLIC BLOOD PRESSURE: 68 MMHG | WEIGHT: 186 LBS | HEIGHT: 66.5 IN | HEART RATE: 53 BPM

## 2025-02-06 DIAGNOSIS — E87.5 HYPERKALEMIA: Primary | ICD-10-CM

## 2025-02-06 DIAGNOSIS — I50.9 CARDIAC EDEMA (HCC): ICD-10-CM

## 2025-02-06 DIAGNOSIS — I48.0 PAROXYSMAL ATRIAL FIBRILLATION (HCC): ICD-10-CM

## 2025-02-06 DIAGNOSIS — M48.061 SPINAL STENOSIS OF LUMBAR REGION, UNSPECIFIED WHETHER NEUROGENIC CLAUDICATION PRESENT: ICD-10-CM

## 2025-02-06 DIAGNOSIS — G62.9 PERIPHERAL POLYNEUROPATHY: ICD-10-CM

## 2025-02-06 DIAGNOSIS — R60.9 CHRONIC EDEMA: ICD-10-CM

## 2025-02-06 DIAGNOSIS — I73.9 PAD (PERIPHERAL ARTERY DISEASE): ICD-10-CM

## 2025-02-06 DIAGNOSIS — E11.9 TYPE 2 DIABETES MELLITUS WITHOUT COMPLICATION, WITHOUT LONG-TERM CURRENT USE OF INSULIN (HCC): ICD-10-CM

## 2025-02-06 DIAGNOSIS — E78.2 MIXED HYPERLIPIDEMIA: ICD-10-CM

## 2025-02-06 DIAGNOSIS — I25.10 CORONARY ARTERY DISEASE INVOLVING NATIVE CORONARY ARTERY OF NATIVE HEART WITHOUT ANGINA PECTORIS: ICD-10-CM

## 2025-02-06 DIAGNOSIS — R60.0 LEG EDEMA: ICD-10-CM

## 2025-02-06 DIAGNOSIS — E87.5 HYPERKALEMIA: ICD-10-CM

## 2025-02-06 LAB
ALBUMIN SERPL-MCNC: 4.7 G/DL (ref 3.2–4.8)
ALBUMIN/GLOB SERPL: 1.8 {RATIO} (ref 1–2)
ALP LIVER SERPL-CCNC: 78 U/L
ALT SERPL-CCNC: 14 U/L
ANION GAP SERPL CALC-SCNC: 10 MMOL/L (ref 0–18)
AST SERPL-CCNC: 17 U/L (ref ?–34)
BASOPHILS # BLD AUTO: 0.08 X10(3) UL (ref 0–0.2)
BASOPHILS NFR BLD AUTO: 1 %
BILIRUB SERPL-MCNC: 0.7 MG/DL (ref 0.2–1.1)
BNP SERPL-MCNC: 191 PG/ML (ref ?–100)
BUN BLD-MCNC: 24 MG/DL (ref 9–23)
BUN/CREAT SERPL: 14.9 (ref 10–20)
CALCIUM BLD-MCNC: 9.5 MG/DL (ref 8.7–10.4)
CHLORIDE SERPL-SCNC: 105 MMOL/L (ref 98–112)
CHOLEST SERPL-MCNC: 154 MG/DL (ref ?–200)
CO2 SERPL-SCNC: 26 MMOL/L (ref 21–32)
CREAT BLD-MCNC: 1.61 MG/DL
DEPRECATED RDW RBC AUTO: 44.7 FL (ref 35.1–46.3)
EGFRCR SERPLBLD CKD-EPI 2021: 45 ML/MIN/1.73M2 (ref 60–?)
EOSINOPHIL # BLD AUTO: 0.29 X10(3) UL (ref 0–0.7)
EOSINOPHIL NFR BLD AUTO: 3.8 %
ERYTHROCYTE [DISTWIDTH] IN BLOOD BY AUTOMATED COUNT: 13.7 % (ref 11–15)
EST. AVERAGE GLUCOSE BLD GHB EST-MCNC: 154 MG/DL (ref 68–126)
FASTING PATIENT LIPID ANSWER: YES
FASTING STATUS PATIENT QL REPORTED: YES
GLOBULIN PLAS-MCNC: 2.6 G/DL (ref 2–3.5)
GLUCOSE BLD-MCNC: 118 MG/DL (ref 70–99)
HBA1C MFR BLD: 7 % (ref ?–5.7)
HCT VFR BLD AUTO: 39.7 %
HDLC SERPL-MCNC: 42 MG/DL (ref 40–59)
HGB BLD-MCNC: 13.3 G/DL
IMM GRANULOCYTES # BLD AUTO: 0.03 X10(3) UL (ref 0–1)
IMM GRANULOCYTES NFR BLD: 0.4 %
LDLC SERPL CALC-MCNC: 94 MG/DL (ref ?–100)
LYMPHOCYTES # BLD AUTO: 1.13 X10(3) UL (ref 1–4)
LYMPHOCYTES NFR BLD AUTO: 14.7 %
MCH RBC QN AUTO: 29.5 PG (ref 26–34)
MCHC RBC AUTO-ENTMCNC: 33.5 G/DL (ref 31–37)
MCV RBC AUTO: 88 FL
MONOCYTES # BLD AUTO: 0.65 X10(3) UL (ref 0.1–1)
MONOCYTES NFR BLD AUTO: 8.4 %
NEUTROPHILS # BLD AUTO: 5.52 X10 (3) UL (ref 1.5–7.7)
NEUTROPHILS # BLD AUTO: 5.52 X10(3) UL (ref 1.5–7.7)
NEUTROPHILS NFR BLD AUTO: 71.7 %
NONHDLC SERPL-MCNC: 112 MG/DL (ref ?–130)
OSMOLALITY SERPL CALC.SUM OF ELEC: 297 MOSM/KG (ref 275–295)
PLATELET # BLD AUTO: 168 10(3)UL (ref 150–450)
POTASSIUM SERPL-SCNC: 5.4 MMOL/L (ref 3.5–5.1)
PROT SERPL-MCNC: 7.3 G/DL (ref 5.7–8.2)
RBC # BLD AUTO: 4.51 X10(6)UL
SODIUM SERPL-SCNC: 141 MMOL/L (ref 136–145)
TRIGL SERPL-MCNC: 97 MG/DL (ref 30–149)
VLDLC SERPL CALC-MCNC: 16 MG/DL (ref 0–30)
WBC # BLD AUTO: 7.7 X10(3) UL (ref 4–11)

## 2025-02-06 PROCEDURE — 83880 ASSAY OF NATRIURETIC PEPTIDE: CPT

## 2025-02-06 PROCEDURE — 83036 HEMOGLOBIN GLYCOSYLATED A1C: CPT

## 2025-02-06 PROCEDURE — 80053 COMPREHEN METABOLIC PANEL: CPT

## 2025-02-06 PROCEDURE — 85025 COMPLETE CBC W/AUTO DIFF WBC: CPT

## 2025-02-06 PROCEDURE — 80061 LIPID PANEL: CPT

## 2025-02-06 PROCEDURE — 99214 OFFICE O/P EST MOD 30 MIN: CPT | Performed by: INTERNAL MEDICINE

## 2025-02-06 PROCEDURE — 36415 COLL VENOUS BLD VENIPUNCTURE: CPT

## 2025-02-06 RX ORDER — FUROSEMIDE 40 MG/1
40 TABLET ORAL 2 TIMES DAILY PRN
Qty: 180 TABLET | Refills: 3 | Status: SHIPPED | OUTPATIENT
Start: 2025-02-06

## 2025-02-10 ENCOUNTER — TELEPHONE (OUTPATIENT)
Dept: INTERNAL MEDICINE CLINIC | Facility: CLINIC | Age: 75
End: 2025-02-10

## 2025-02-10 DIAGNOSIS — E87.5 HYPERKALEMIA: Primary | ICD-10-CM

## 2025-02-10 NOTE — TELEPHONE ENCOUNTER
Please notify patient that I reviewed the blood work from 2/6  - He's done a great job bringing his sugars down from 7.8% to 7.0%  - Kidney function is stable. But the potassium level is creeping up on us    Therefore we need to stop the spironolactone and maintain furosemide by itself.    For the next 3 days, take 2 tablets Lasix 80 mg total  - Then start taking 1.5 tablets Lasix 60 mg Friday 2/14    Repeat BMP in 1 week, not fasting.

## 2025-02-19 ENCOUNTER — LAB ENCOUNTER (OUTPATIENT)
Dept: LAB | Age: 75
End: 2025-02-19
Attending: INTERNAL MEDICINE
Payer: MEDICARE

## 2025-02-19 DIAGNOSIS — E87.5 HYPERKALEMIA: ICD-10-CM

## 2025-02-19 LAB
ANION GAP SERPL CALC-SCNC: 9 MMOL/L (ref 0–18)
BUN BLD-MCNC: 37 MG/DL (ref 9–23)
BUN/CREAT SERPL: 22.2 (ref 10–20)
CALCIUM BLD-MCNC: 9.8 MG/DL (ref 8.7–10.4)
CHLORIDE SERPL-SCNC: 104 MMOL/L (ref 98–112)
CO2 SERPL-SCNC: 27 MMOL/L (ref 21–32)
CREAT BLD-MCNC: 1.67 MG/DL
EGFRCR SERPLBLD CKD-EPI 2021: 43 ML/MIN/1.73M2 (ref 60–?)
FASTING STATUS PATIENT QL REPORTED: YES
GLUCOSE BLD-MCNC: 148 MG/DL (ref 70–99)
OSMOLALITY SERPL CALC.SUM OF ELEC: 301 MOSM/KG (ref 275–295)
POTASSIUM SERPL-SCNC: 5 MMOL/L (ref 3.5–5.1)
SODIUM SERPL-SCNC: 140 MMOL/L (ref 136–145)

## 2025-02-19 PROCEDURE — 36415 COLL VENOUS BLD VENIPUNCTURE: CPT

## 2025-02-19 PROCEDURE — 80048 BASIC METABOLIC PNL TOTAL CA: CPT

## 2025-02-20 ENCOUNTER — TELEPHONE (OUTPATIENT)
Dept: INTERNAL MEDICINE CLINIC | Facility: CLINIC | Age: 75
End: 2025-02-20

## 2025-02-20 NOTE — TELEPHONE ENCOUNTER
Please notify patient that the BMP shows potassium at 5.0.  Kidney function remains stable.  - Skin update on his leg swelling.  If it is stable, we can continue with Lasix 60 mg, 1.5 tablets/day    Lets continue with a low potassium diet, try to minimize foods such as bananas.  Keep up with good water intake

## 2025-04-08 ENCOUNTER — HOSPITAL ENCOUNTER (OUTPATIENT)
Dept: ULTRASOUND IMAGING | Facility: HOSPITAL | Age: 75
Discharge: HOME | End: 2025-04-08
Attending: RADIOLOGY
Payer: MEDICARE

## 2025-04-08 DIAGNOSIS — I73.9 PAD (PERIPHERAL ARTERY DISEASE): ICD-10-CM

## 2025-04-08 PROCEDURE — 93926 LOWER EXTREMITY STUDY: CPT | Performed by: RADIOLOGY

## 2025-04-15 ENCOUNTER — TELEPHONE (OUTPATIENT)
Dept: INTERVENTIONAL RADIOLOGY/VASCULAR | Facility: HOSPITAL | Age: 75
End: 2025-04-15

## 2025-04-15 NOTE — TELEPHONE ENCOUNTER
Patient called to cancel appt for CT Scan lower ext.  He is not ready to do any procedures if the CT Scan results show any blockage.  Patient will call me to reschedule CT Scan.   LP w/ IT chemo LP with IT chemo

## 2025-04-23 ENCOUNTER — TELEPHONE (OUTPATIENT)
Dept: INTERNAL MEDICINE CLINIC | Facility: CLINIC | Age: 75
End: 2025-04-23

## 2025-04-23 DIAGNOSIS — F41.9 ANXIETY: ICD-10-CM

## 2025-04-23 RX ORDER — ALPRAZOLAM 0.5 MG
0.5 TABLET ORAL 2 TIMES DAILY PRN
Qty: 60 TABLET | Refills: 5 | Status: SHIPPED | OUTPATIENT
Start: 2025-04-23

## 2025-04-23 NOTE — TELEPHONE ENCOUNTER
To Dr. OCHOA-    The above refill request is for a controlled substance.  Please review pended medication order.  LOV: 2/6/25  Prescribed: 60 with 5 10/24  : 3/29 60

## 2025-04-24 RX ORDER — ASPIRIN 81 MG/1
81 TABLET, COATED ORAL DAILY
Qty: 90 TABLET | Refills: 3 | OUTPATIENT
Start: 2025-04-24

## 2025-04-24 NOTE — TELEPHONE ENCOUNTER
Not prescribed by our office     Current refill request refused due to refill is either a duplicate request or has active refills at the pharmacy.  Check previous templates.    Requested Prescriptions     Pending Prescriptions Disp Refills    ASPIRIN LOW DOSE 81 MG Oral Tab EC [Pharmacy Med Name: ASPIRIN 81MG EC LOW DOSE  TABLETS] 90 tablet 3     Sig: TAKE 1 TABLET BY MOUTH EVERY DAY

## 2025-04-28 PROBLEM — Z85.038 HISTORY OF COLON CANCER: Status: ACTIVE | Noted: 2025-04-28

## 2025-04-28 PROBLEM — C18.9 COLON CANCER (HCC): Status: RESOLVED | Noted: 2019-06-14 | Resolved: 2025-04-28

## 2025-05-04 NOTE — H&P
HPI:   Geovanny Prabhakar is a 74 year old male who presents for a Medicare Subsequent Annual Wellness visit (Pt already had Initial Annual Wellness).    No new symptoms. Feet problems with nightly pain in the shins. Moreso the neuropaty of the toes, always there.    Can walk about a block, block and a half before needing to stop. Using tylenol and Neurveen. Uses Hydrocodone.    He is concerned about the bypass surgery. 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   - Nutrition: 1 time a day, everything veggies. Only drinks water, soda  - Physical Activity: walking around the block more,  Pedal bike      No topic due editable text        Fall Risk Assessment:   He has been screened for Falls and is low risk.    Cognitive Assessment:   He had a completely normal cognitive assessment - see flowsheet entries       Functional Ability/Status:   Geovanny Prabhakar has some abnormal functions as listed below:  He has Walking problems based on screening of functional status.       Depression Screening (PHQ-2/PHQ-9): Over the LAST 2 WEEKS   Little interest or pleasure in doing things: Not at all  Feeling down, depressed, or hopeless: Not at all  PHQ-2 SCORE: 0      Advanced Directive:  He does NOT have a Living Will. [Do you have a living will?: No]  He does NOT have a Power of  for Health Care. [Do you have a healthcare power of ?: No]    Tobacco:  Tobacco Use[1]  E-Cigarettes/Vaping       Questions Responses    E-Cigarette Use Current Every Day User          E-Cigarette/Vaping Substances       Questions Responses    Nicotine Yes    THC No    CBD No    Flavoring No          E-Cigarette/Vaping Devices       Questions Responses    Disposable No    Pre-filled or Refillable Cartridge  No    Refillable Tank Yes    Pre-filled Pod No           Tobacco cessation counseling for <3 minutes.  He smoked tobacco in the past but quit greater than 12 months ago.  Tobacco Use[2]     Mr. Prabhakar already takes aspirin and has it on his medication list.   CAGE Alcohol Screen:   CAGE screening score of 0 on 5/6/2025, showing low risk of alcohol abuse.       Patient Care Team: Patient Care Team:  Aaron Helton MD as PCP - General (Internal Medicine)    Patient Active Problem List   Diagnosis    Type 2 diabetes mellitus without complication (HCC)    Pure hypercholesterolemia    Coronary artery disease involving native coronary artery of native heart without angina pectoris    Paroxysmal atrial fibrillation (HCC)    Essential hypertension with goal blood pressure less than 140/90    Acute anemia    CAD (coronary artery disease)    DM2 (diabetes mellitus, type 2) (HCC)    HTN (hypertension)    Encounter for screening for malignant neoplasm of prostate     Acute pain of right foot    Acute lower limb ischemia    History of colon cancer     Wt Readings from Last 3 Encounters:   05/06/25 181 lb 3.2 oz (82.2 kg)   02/06/25 186 lb (84.4 kg)   12/31/24 180 lb (81.6 kg)      Last Cholesterol Labs:   Lab Results   Component Value Date    CHOLEST 154 02/06/2025    HDL 42 02/06/2025    LDL 94 02/06/2025    TRIG 97 02/06/2025          Last Chemistry Labs:   Lab Results   Component Value Date    AST 17 02/06/2025    ALT 14 02/06/2025    CA 9.8 02/19/2025    ALB 4.7 02/06/2025    TSH 1.773 04/04/2024    CREATSERUM 1.67 (H) 02/19/2025     (H) 02/19/2025        CBC  (most recent labs)   Lab Results   Component Value Date    WBC 7.7 02/06/2025    HGB 13.3 02/06/2025    .0 02/06/2025        ALLERGIES:   He has no known allergies.    CURRENT MEDICATIONS:   Outpatient Medications Marked as Taking for the 5/6/25 encounter (Office Visit) with Aaron Helton MD   Medication Sig    aspirin 81 MG Oral Tab EC Take 1 tablet  (81 mg total) by mouth daily.    Capsaicin 0.1 % External Cream Apply 1 Application topically 2 (two) times daily as needed.    pregabalin (LYRICA) 25 MG Oral Cap Take 1 capsule (25 mg total) by mouth at bedtime.    ALPRAZOLAM 0.5 MG Oral Tab TAKE 1 TABLET(0.5 MG) BY MOUTH TWICE DAILY AS NEEDED FOR ANXIETY OR SLEEP    furosemide 40 MG Oral Tab Take 1 tablet (40 mg total) by mouth 2 (two) times daily as needed (Swelling in the feet).    pravastatin 20 MG Oral Tab Take 1 tablet (20 mg total) by mouth nightly.    clopidogrel 75 MG Oral Tab Take 1 tablet (75 mg total) by mouth daily.    SPIRONOLACTONE 25 MG Oral Tab TAKE 1 TABLET(25 MG) BY MOUTH DAILY    glipiZIDE ER 10 MG Oral Tablet 24 Hr Take 1 tablet (10 mg total) by mouth daily.    metoprolol succinate  MG Oral Tablet 24 Hr Take 1 tablet (100 mg total) by mouth 2 (two) times daily.    lisinopril 20 MG Oral Tab Take 1 tablet (20 mg total) by mouth 2 (two) times daily.    Glucose Blood (ONETOUCH ULTRA BLUE) In Vitro Strip Check BS once daily1    ONETOUCH DELICA LANCETS 33G Does not apply Misc Test BS once daily      MEDICAL INFORMATION:   He  has a past medical history of Atherosclerosis of coronary artery, CAD (coronary artery disease), Cancer (HCC), Colon cancer (HCC), COVID-19 virus infection, Diabetes (HCC), DM2 (diabetes mellitus, type 2) ([facility]), Essential hypertension, HTN (hypertension), Hyperlipidemia, Other abnormal glucose, and Paroxysmal atrial fibrillation (HCC).    He  has a past surgical history that includes other; other; other; other; other; and Cardiac pacemaker placement.    His family history is not on file.   SOCIAL HISTORY:   He  reports that he has quit smoking. He has never used smokeless tobacco. He reports that he does not drink alcohol and does not use drugs.     REVIEW OF SYSTEMS:   GENERAL: feels well otherwise  SKIN: denies any unusual skin lesions; + interval improvement of right lateral fifth MTP joint wound  EYES: denies blurred  vision or double vision  HEENT: denies nasal congestion, sinus pain or ST  LUNGS: denies shortness of breath with exertion  CARDIOVASCULAR: denies chest pain on exertion  GI: denies abdominal pain, denies heartburn  : 0-1 per night nocturia, no complaint of urinary incontinence  MUSCULOSKELETAL: denies back pain  NEURO: denies headaches  PSYCHE: denies depression or anxiety  HEMATOLOGIC: denies hx of anemia  ENDOCRINE: denies thyroid history  ALL/ASTHMA: denies hx of allergy or asthma    EXAM:   /64   Pulse 53   Ht 5' 6.5\" (1.689 m)   Wt 181 lb 3.2 oz (82.2 kg)   SpO2 97%   BMI 28.81 kg/m²   Estimated body mass index is 28.81 kg/m² as calculated from the following:    Height as of this encounter: 5' 6.5\" (1.689 m).    Weight as of this encounter: 181 lb 3.2 oz (82.2 kg).    Medicare Hearing Assessment  (Required for AWV/SWV)    Hearing Screening    Screening Method: Questionnaire  I have a problem hearing over the telephone: No I have trouble following the conversations when two or more people are talking at the same time: No   I have trouble understanding things on the TV: No I have to strain to understand conversations: No   I have to worry about missing the telephone ring or doorbell: No I have trouble hearing conversations in a noisy background such as a crowded room or restaurant: No   I get confused about where sounds come from: No I misunderstand some words in a sentence and need to ask people to repeat themselves: No   I especially have trouble understanding the speech of women and children: No I have trouble understanding the speaker in a large room such as at a meeting or place of Restorationism: No   Many people I talk to seem to mumble (or don't speak clearly): No People get annoyed because I misunderstand what they say: No   I misunderstand what others are saying and make inappropriate responses: No I avoid social activities because I cannot hear well and fear I will reply improperly: No   Family  members and friends have told me they think I may have hearing loss: No                      Visual Acuity                           General Appearance:  Alert, cooperative, no distress, appears stated age   Head:  Normocephalic, without obvious abnormality, atraumatic   Eyes:  PERRL, conjunctiva/corneas clear, EOM's intact, both eyes   Ears:  Normal TM's and external ear canals, both ears   Nose: Nares normal, septum midline, mucosa normal, no drainage or sinus tenderness   Throat: Lips, mucosa, and tongue normal; teeth and gums normal   Neck: Supple, symmetrical, trachea midline, no adenopathy, thyroid: not enlarged, symmetric, no tenderness/mass/nodules, no carotid bruit or JVD   Back:   Symmetric, no curvature, ROM normal, no CVA tenderness   Lungs:   Clear to auscultation bilaterally, respirations unlabored   Chest Wall:  No tenderness or deformity   Heart:  Regular rate and rhythm, S1, S2 normal, no murmur, rub or gallop   Abdomen:   Soft, non-tender, bowel sounds active all four quadrants,  no masses, no organomegaly   Genitalia: Deferred   Rectal: Deferred   Extremities: Extremities normal, atraumatic, no cyanosis or edema   Pulses: 2+ and symmetric   Skin: + interval improvement of right lateral fifth MTP joint wound   Lymph nodes: Cervical, supraclavicular, and axillary nodes normal   Neurologic: Normal, CN II through XII intact, 5 out of 5 muscle strength throughout, 2+ DTRs patellar tendons, normal gait          Vaccination History     Immunization History   Administered Date(s) Administered    Covid-19 Vaccine Moderna 100 mcg/0.5 ml 03/09/2021, 04/06/2021    Covid-19 Vaccine Moderna 50 Mcg/0.25 Ml 12/01/2021        ASSESSMENT AND OTHER RELEVANT CHRONIC CONDITIONS:   Geovanny Prabhakar is a 74 year old male who presents for a Medicare Assessment.   Right foot x-ray 1/28/2024    Impression   CONCLUSION:  1. Macro right foot.  No destructive/erosive osseous changes are seen to suggest osteomyelitis  radiographically.  2. Lesser incidental findings as above.         CTA lower extremities 2/6/2024  Narrative   PROCEDURE: CTA LOWER EXTREMITY BILAT (CPT=73706)     COMPARISON: None.     INDICATIONS: PAD (peripheral artery disease).     TECHNIQUE: CT images were obtained without and with non-ionic intravenous contrast material. Multi-planar reformatted and 3-D images were created with vessel analysis performed on an independent workstation.  Automated exposure control for dose reduction   was used. Adjustment of the mA and/or kV was done based on the patient's size. Use of iterative reconstruction technique for dose reduction was used.  Dose information is transmitted to the ACR (American College of Radiology) NRDR (National Radiology  Data Registry) which includes the Dose Index Registry.     FINDINGS:     RIGHT LEG    AORTO-ILIAC: There is severe atherosclerotic calcification of the common iliac artery extending into the right internal and external iliac arteries. Moderate grade stenosis of the internal and external iliac arteries without occlusion.  FEMORAL ARTERY:   Moderate atherosclerotic calcification and noncalcified atheromatous plaques throughout the common femoral artery as well as within the superficial femoral artery throughout its entire course. High-grade stenosis and narrowing  involving the proximal superficial femoral artery extending into the mid and distal aspects of the SFA.  There is complete occlusion seen within the mid superficial femoral artery seen on series 5, image 115 extending into the distal superficial femoral  artery at the level of the adductor canal.  There is retrograde filling from intramuscular branches.  The total area of occlusion measures approximately 6.3 cm.  No aneurysm.  The profundus femoris artery is patent.    POPLITEAL ARTERY:   Moderate grade atherosclerotic calcification and noncalcified atheromatous plaque seen throughout the origin of the popliteal artery. No  focal occlusion or aneurysmal dilation.  RUN-OFF ARTERIES:   Severe atherosclerotic calcification seen within the distal popliteal artery extending into the tibioperoneal trunk.  There is extensive atherosclerotic calcification seen throughout the posterior and anterior tibial arteries as well   as peroneal arteries which are markedly diminutive.  There is nondiagnostic evaluation of the calf vessels due to extensive atherosclerotic calcification.     LEFT LEG    AORTO-ILIAC: There is severe atherosclerotic calcification of the common iliac artery extending into the left internal and external iliac arteries. Moderate grade stenosis of the internal and external iliac arteries without occlusion.  FEMORAL ARTERY:   Moderate atherosclerotic calcification and noncalcified atheromatous plaques throughout the common femoral artery as well as within the superficial femoral artery throughout its entire course. High-grade stenosis and narrowing  involving the proximal superficial femoral artery extending into the mid and distal aspects of the SFA.  No complete occlusion.    POPLITEAL ARTERY:   Moderate grade atherosclerotic calcification and noncalcified atheromatous plaque seen throughout the origin of the popliteal artery. No focal occlusion or aneurysmal dilation.  RUN-OFF ARTERIES: Severe atherosclerotic calcification seen within the distal popliteal artery extending into the tibioperoneal trunk.  There is extensive atherosclerotic calcification seen throughout the posterior and anterior tibial arteries as well  as peroneal arteries which are markedly diminutive.  There is nondiagnostic evaluation of the calf vessels due to extensive atherosclerotic calcification.     OTHER FINDINGS: There are bilateral inguinal hernias containing fat. No bowel herniation. Bladder wall thickening greater than what is expected for under distention, likely due to chronic bladder outlet obstruction from an enlarged prostate. No mass  or  fluid collection.  No enlarged lymph nodes. Degenerative disc and facet disease is partially visualized in the lower lumbar spine. Degenerative changes are seen within the sacroiliac joints and pubic symphysis. Degenerative changes are seen in the  bilateral hips.  Degenerative changes are seen within the bilateral knees, ankle and feet.               Impression   CONCLUSION:     Extensive atherosclerotic calcification seen within the aortobiiliac system and throughout the bilateral lower extremities.     Approximately 6.3 cm area of complete occlusion involving the mid to distal right superficial femoral artery with retrograde filling.     Essentially nondiagnostic evaluation of the bilateral calf vessels due to extensive atherosclerotic calcification and diminutive nature.     Multifocal areas of high-grade stenosis within the left superficial femoral artery without complete occlusion.     Multiple other incidental findings as described in the body of the report.      X-ray right foot 3/29/2024        Impression   CONCLUSION:     Small cutaneous ulcer over the lateral aspect of the fifth digit, without radiographic evidence of acute osteomyelitis.  MRI of the forefoot is more sensitive for the detection of acute osteomyelitis.     Moderate first metatarsophalangeal joint osteoarthritis.      Duplex ultrasound 12/13/2024        Impression   CONCLUSION:  Diffuse vascular calcifications, with evidence of recurrent occlusions involving the superficial femoral, posterior tibial, and anterior tibial arteries.  There is collateralized flow into the peroneal, which is the dominant distal runoff  vessel.         Carotid Dopplers 1/14/2025    CONCLUSION:  1. Bilateral carotid bifurcation/proximal ICA plaque without hemodynamic significance.    2. Moderate left ECA stenosis (50-69%) of uncertain clinical significance.  3. Antegrade flow within both vertebral arteries        Arterial duplex right lower extremity  4/9/2025    CONCLUSION:  1. Multilevel peripheral arterial occlusive disease, with moderate external iliac stenosis.  Previously recanalized stented right superficial femoral artery remains widely patent.  Below knee tibial disease, with one vessel distal runoff via the peroneal   artery.          PLAN SUMMARY:   Diagnoses and all orders for this visit:    Annual physical exam  -     Comp Metabolic Panel (14); Future    Anxiety    PAD (peripheral artery disease)    Spinal stenosis of lumbar region, unspecified whether neurogenic claudication present    Type 2 diabetes mellitus without complication, without long-term current use of insulin (HCC)  -     Comp Metabolic Panel (14); Future  -     Hemoglobin A1C; Future    Paroxysmal atrial fibrillation (HCC)    Mixed hyperlipidemia    Peripheral polyneuropathy  -     Capsaicin 0.1 % External Cream; Apply 1 Application topically 2 (two) times daily as needed.  -     pregabalin (LYRICA) 25 MG Oral Cap; Take 1 capsule (25 mg total) by mouth at bedtime.    Hyperlipidemia, unspecified hyperlipidemia type  -     Lipid Panel; Future    Vitamin B12 deficiency  -     Vitamin B12; Future    Screening for deficiency anemia  -     CBC With Differential With Platelet; Future    Screening for thyroid disorder  -     TSH W Reflex To Free T4; Future    Screening for nephropathy  -     Microalb/Creat Ratio, Random Urine; Future    Primary hypertension    History of colon cancer    Screening for prostate cancer  -     PSA Total, Screen; Future    Encounter for annual health examination    Other orders  -     aspirin 81 MG Oral Tab EC; Take 1 tablet (81 mg total) by mouth daily.         Chronic nonhealing wound  Peripheral arterial disease  Ongoing over the course of months, initially patient declined any sort of invasive management  -Ultrasound duplex 1/24/2024: Focal occlusion of the mid superficial femoral artery with single-vessel runoff via anterior tibial artery and peroneal artery.   On the left there is greater than 70% aortoiliac inflow disease, main infrapopliteal runoff vessels are all occluded  - Status post right lower extremity angioplasty and stenting 2/7/2024 with Dr. Greenfield.  Noted to have chronic long segment SFA occlusion and distal tibial on the right lower extremity.  -Recently evaluated by podiatry, Dr. George last seen 3/29/2024: Status postdebridement, orthotics with inserts  - Status post right lower extremity arteriogram, superficial femoral, posterior tibial and anterior tibial angioplasty 5/22/2024.    -Duplex lower extremity 9/3/2020 for recurrent in-stent restenosis within SFA history, recurrent posterior tibial artery occlusion.  Two-vessel distal runoff via anterior tibial and peroneal arteries  - Most recent arterial duplex right lower extremity multilevel peripheral arterial disease, moderate external iliac stenosis.  Previously recanalized stented right superficial femoral artery remains patent  - We will continue with furosemide on as-needed basis.  Using very sparingly.  - Follow-up with Dr. Greenfield, last seen 4/11/2025 they are planning for left lower extremity CTA for further evaluation to determine need for further revascularization    Other measures we can consider  - As Lyrica helped with side effects, we can try the lowest dose of Lyrica 25 mg nightly  - Trial of capsaicin cream, try at nighttime first.  Can increase to twice a day as needed.  Apply affected areas  - We can consider massage therapy  - Acupuncture therapy may not be a good idea as this might precipitate risk for wounds/abrasions and infection  - If needed, we should consider bypass surgery recommended by Dr. Greenfield.  Can hold on this for now     CAD  Status post CABG  - Would benefit from use of aspirin  - On metoprolol 100 mg twice a day     Paroxysmal A-fib  Status post Maze procedure.  - On metoprolol 100 mg twice a day  - Declining use of anticoagulation.    - Pacemaker in place     Carotid  artery stenosis  -Ultrasound carotids 1/14/2025: Bilateral carotid ICA plaques without hemodynamic significance.  Moderate left ECA stenosis 50-69% of uncertain clinical significance.  - Amenable to cholesterol lowering agent as below     Type 2 diabetes:    Recent A1c:   HEMOGLOBIN A1C (%)   Date Value   10/18/2023 6.8 (A)     HgbA1C (%)   Date Value   02/06/2025 7.0 (H)     Recent urine microalbumin: No components found for: \"URINEMICROALBUMIN\"  Current medications: Glipizide 10 mg daily  Eye exam: Denies any referrals for ophthalmology  Foot exam: Follows with podiatry,  - Plan to increase glipizide to 10 mg twice a day for further glycemic control     Hypertension  -Blood pressure today at goal  - Check blood pressures at home  - Continue with home metoprolol, lisinopril 20 mg daily, spironolactone 25 mg daily. we discussed if intolerable, may benefit from cardiology evaluation with use of Repatha.     Hyperlipidemia  - Last lipid panel with , total cholesterol 178  - Amenable to trial of pravastatin 20 mg nightly as has had myalgias on prior statin treatment with atorvastatin     CKD stage IIIa  - BUN 37, creatinine 1.67, EGFR 43  - Plan to repeat renal function panel  - Keep up with good water intake in the meantime while monitoring for leg swelling    History of colon cancer  Status post hemicolectomy 2019.  - No new recurrences, patient declining any further colonoscopies  - CEA was undetectable.  Will proceed with repeat CEA level     Peripheral neuropathy  - Can be contributing to lower extremity symptoms.  - Trial Lyrica 50 mg twice a day - not currently taking  -Vitamin B12 level at goal  - will try to supplement with vitamin B12, cyanocobalamin 1000 mcg once a day.     Chronic rhinitis  - Switch from Zyrtec to Xyzal  -Alternative medication would be montelukast/Singulair     Spinal stenosis of lumbar region  Chronic intermittent low back pain  -Would recommend initial trial of conservative  therapy:     -Acetaminophen 500-650 mg every 4-6 hours as needed for pain relief  -Ibuprofen 200-400 mg every 8 hours as needed for anti-inflammatory and pain relief  -For more severe pain, on Norco as needed  -Trial of physical therapy can be considered if home exercise regimen insufficient  - This has remained stable    HTN Screen: BP at goal  DM Screen: As above  HLD Screen: As above  HCV Screen: Considered low risk  HIV Screen: considered low risk  G/C/Syphilis: Considered low risk    Colon Cancer Screening (45-70): Declining any further colonoscopies  Prostate Cancer Screening: (50-70): Check PSA at next visit  Lung Cancer Screening (55-79 with 30 p/year and active < 15 years): Not indicated  AAA Screening (65-75 Hx of smoking): Not indicated    Influenza: Annually   Td/Tdap: Last Tdap - unknown  Zoster (50+): Recommended 2 doses Shringrix  HPV (19-26): Not indicated  Pneumococcal: Due for Prevnar 20    Immunization History   Administered Date(s) Administered    Covid-19 Vaccine Moderna 100 mcg/0.5 ml 03/09/2021, 04/06/2021    Covid-19 Vaccine Moderna 50 Mcg/0.25 Ml 12/01/2021           Diet assessment: good     PLAN:  The patient indicates understanding of these issues and agrees to the plan.  Reinforced healthy diet, lifestyle, and exercise.    No follow-ups on file.         General Health     In the past six months, have you lost more than 10 pounds without trying?: 2 - No  Has your appetite been poor?: No  How does the patient maintain a good energy level?: Appropriate Exercise  How would you describe your daily physical activity?: Light  How would you describe your current health state?: Good  How do you maintain positive mental well-being?: Social Interaction, Puzzles, Games, Visiting Family     Supplementary Documentation:   Geovanny Prabhakar's SCREENING SCHEDULE   Tests on this list are recommended by your physician but may not be covered, or covered at this frequency, by your insurer.   Please check with  your insurance carrier before scheduling to verify coverage.   PREVENTATIVE SERVICES FREQUENCY &  COVERAGE DETAILS LAST COMPLETION DATE   Diabetes Screening    Fasting Blood Sugar / Glucose    One screening every 12 months if never tested or if previously tested but not diagnosed with pre-diabetes   One screening every 6 months if diagnosed with pre-diabetes Lab Results   Component Value Date    GLUCOSE 103 (H) 05/25/2016     (H) 02/19/2025        Cardiovascular Disease Screening    Lipid Panel  Cholesterol  Lipoprotein (HDL)  Triglycerides Covered every 5 years for all Medicare beneficiaries without apparent signs or symptoms of cardiovascular disease Lab Results   Component Value Date    CHOLEST 154 02/06/2025    HDL 42 02/06/2025    LDL 94 02/06/2025    TRIG 97 02/06/2025         Electrocardiogram (EKG)   Covered if needed at Welcome to Medicare, and non-screening if indicated for medical reasons 06/26/2019      Ultrasound Screening for Abdominal Aortic Aneurysm (AAA) Covered once in a lifetime for one of the following risk factors    Men who are 65-75 years old and have ever smoked    Anyone with a family history -     Colorectal Cancer Screening  Covered for ages 50-85; only need ONE of the following:    Colonoscopy   Covered every 10 years    Covered every 2 years if patient is at high risk or previous colonoscopy was abnormal -    Health Maintenance   Topic Date Due    Colorectal Cancer Screening  Never done       Flexible Sigmoidoscopy   Covered every 4 years -    Fecal Occult Blood Test Covered annually -   Prostate Cancer Screening    Prostate-Specific Antigen (PSA) Annually Lab Results   Component Value Date    PSA 1.1 05/25/2016     Health Maintenance   Topic Date Due    PSA  04/19/2025      Immunizations    Influenza Covered once per flu season  Please get every year -  No recommendations at this time    Pneumococcal Each vaccine ([id number] & [id number]) covered once after 65 Prevnar 13:  -    [id number]: -     Pneumococcal Vaccination(1 of 2 - PCV) Never done    Hepatitis B One screening covered for patients with certain risk factors   -  No recommendations at this time    Tetanus Toxoid Not covered by Medicare Part B unless medically necessary (cut with metal); may be covered with your pharmacy prescription benefits -    Tetanus, Diptheria and Pertusis TD and TDaP Not covered by Medicare Part B -  No recommendations at this time    Zoster Not covered by Medicare Part B; may be covered with your pharmacy  prescription benefits -  Zoster Vaccines(1 of 2) Never done     Diabetes      Hemoglobin A1C Annually; if last result is elevated, may be asked to retest more frequently.    Medicare covers every 3 months Lab Results   Component Value Date     (H) 02/06/2025    A1C 7.0 (H) 02/06/2025       No recommendations at this time    Creat/alb ratio Annually Lab Results   Component Value Date    MICROALBCREA  04/04/2024      Comment:      Unable to calculate due to Urine Microalbumin <0.3 mg/dL           LDL Annually Lab Results   Component Value Date    LDL 94 02/06/2025       Dilated Eye Exam Annually Last Diabetic Eye Exam:  No data recorded  No data recorded       Annual Monitoring of Persistent Medications (ACE/ARB, digoxin diuretics, anticonvulsants)    Potassium Annually Lab Results   Component Value Date    K 5.0 02/19/2025         Creatinine   Annually Lab Results   Component Value Date    CREATSERUM 1.67 (H) 02/19/2025         BUN Annually Lab Results   Component Value Date    BUN 37 (H) 02/19/2025       Drug Serum Conc Annually No results found for: \"DIGOXIN\", \"DIG\", \"VALP\"                  Spent 40 minutes obtaining history, evaluating patient, discussing treatment options, diet, exercise, review of available labs and radiology reports, and completing documentation.          [1]   Social History  Tobacco Use   Smoking Status Former   Smokeless Tobacco Never   [2]   Social History  Tobacco  Use   Smoking Status Former   Smokeless Tobacco Never

## 2025-05-04 NOTE — PATIENT INSTRUCTIONS
- You were seen in clinic for regular annual check-up. We have ordered labs for you and we will call you with the results. Please obtain the bloodwork fasting for 10**12 hours. OK to drink water the day of your blood draw.      We have the lab downstairs on the first floor.  No appointment is necessary.  Lab hours are Monday-Friday 7:30 AM to 4:45 PM.  There may be Saturday hours 7 AM-11:00 AM as well.     Otherwise you can obtain the blood tests on the weekends at the main hospital or local immediate care/urgent care within the [facility].    - We will await the results of the CT scan recommended by Dr. Greenfield interventional radiology to determine any further need of revascularization therapy due to peripheral arterial disease    Lets continue with home exercise program  Use Lasix as needed for excessive leg swelling  Monitor for any new wounds of lower extremities      Other measures we can consider  - As Lyrica helped with side effects, we can try the lowest dose of Lyrica 25 mg nightly  - Trial of capsaicin cream, try at nighttime first.  Can increase to twice a day as needed.  Apply affected areas  - We can consider massage therapy  - Acupuncture therapy may not be a good idea as this might precipitate risk for wounds/abrasions and infection  - If needed, we should consider bypass surgery recommended by Dr. Greenfield.  Can hold on this for now       Diabetes can be better controlled with a goal less than 7.0  - Please continue trying to cut down on carbohydrates not just in sweets but also in bread/grain/rice/breads  - Targeting weight loss over time can help by optimizing nutrition, targeting exercise as tolerated  - Maintain annual eye examination  - Lets increase the glipizide to 10 mg twice a day    There does seem to be some degree of chronic kidney disease    -Please continue checking your blood pressures at home and notify us if they are increasing   - please continue checking your blood sugars at  home and notify us if they are increasing  - Please continue following up with podiatry for diabetic foot examination  - Vaccines he may be due for COVID dose #4, Prevnar 20/pneumonia shot, We recommended checking with your insurance for coverage of Shingrix, a 2-dose shingles vaccine that can be obtained at the pharmacy if there is adequate coverage through your insurance plan.    - Please continue to eat a varied diet including recommended servings of vegetables, fruits, and low fat dairy. Minimize high saturated fats (such as fast foods) and high sugar intake (such as soda)  - We recommend 150 minutes of moderate intensity exercise (brisk walking, swimming) weekly to maintain your current weight.  Targeted weight loss will require more vigorous exercise or more than 150 minutes/week.    Return to clinic in 4 months for follow-up

## 2025-05-06 ENCOUNTER — OFFICE VISIT (OUTPATIENT)
Dept: INTERNAL MEDICINE CLINIC | Facility: CLINIC | Age: 75
End: 2025-05-06

## 2025-05-06 VITALS
HEART RATE: 53 BPM | DIASTOLIC BLOOD PRESSURE: 64 MMHG | OXYGEN SATURATION: 97 % | SYSTOLIC BLOOD PRESSURE: 128 MMHG | WEIGHT: 181.19 LBS | BODY MASS INDEX: 28.78 KG/M2 | HEIGHT: 66.5 IN

## 2025-05-06 DIAGNOSIS — E78.5 HYPERLIPIDEMIA, UNSPECIFIED HYPERLIPIDEMIA TYPE: ICD-10-CM

## 2025-05-06 DIAGNOSIS — I10 PRIMARY HYPERTENSION: ICD-10-CM

## 2025-05-06 DIAGNOSIS — Z00.00 ANNUAL PHYSICAL EXAM: Primary | ICD-10-CM

## 2025-05-06 DIAGNOSIS — E53.8 VITAMIN B12 DEFICIENCY: ICD-10-CM

## 2025-05-06 DIAGNOSIS — Z13.0 SCREENING FOR DEFICIENCY ANEMIA: ICD-10-CM

## 2025-05-06 DIAGNOSIS — E11.9 TYPE 2 DIABETES MELLITUS WITHOUT COMPLICATION, WITHOUT LONG-TERM CURRENT USE OF INSULIN (HCC): ICD-10-CM

## 2025-05-06 DIAGNOSIS — Z12.5 SCREENING FOR PROSTATE CANCER: ICD-10-CM

## 2025-05-06 DIAGNOSIS — Z00.00 ENCOUNTER FOR ANNUAL HEALTH EXAMINATION: ICD-10-CM

## 2025-05-06 DIAGNOSIS — G62.9 PERIPHERAL POLYNEUROPATHY: ICD-10-CM

## 2025-05-06 DIAGNOSIS — Z85.038 HISTORY OF COLON CANCER: ICD-10-CM

## 2025-05-06 DIAGNOSIS — I73.9 PAD (PERIPHERAL ARTERY DISEASE): ICD-10-CM

## 2025-05-06 DIAGNOSIS — F41.9 ANXIETY: ICD-10-CM

## 2025-05-06 DIAGNOSIS — E78.2 MIXED HYPERLIPIDEMIA: ICD-10-CM

## 2025-05-06 DIAGNOSIS — I48.0 PAROXYSMAL ATRIAL FIBRILLATION (HCC): ICD-10-CM

## 2025-05-06 DIAGNOSIS — M48.061 SPINAL STENOSIS OF LUMBAR REGION, UNSPECIFIED WHETHER NEUROGENIC CLAUDICATION PRESENT: ICD-10-CM

## 2025-05-06 DIAGNOSIS — Z13.89 SCREENING FOR NEPHROPATHY: ICD-10-CM

## 2025-05-06 DIAGNOSIS — Z13.29 SCREENING FOR THYROID DISORDER: ICD-10-CM

## 2025-05-06 PROCEDURE — 99499 UNLISTED E&M SERVICE: CPT | Performed by: INTERNAL MEDICINE

## 2025-05-06 PROCEDURE — G0439 PPPS, SUBSEQ VISIT: HCPCS | Performed by: INTERNAL MEDICINE

## 2025-05-06 PROCEDURE — 99215 OFFICE O/P EST HI 40 MIN: CPT | Performed by: INTERNAL MEDICINE

## 2025-05-06 RX ORDER — ASPIRIN 81 MG/1
81 TABLET ORAL DAILY
Qty: 90 TABLET | Refills: 3 | Status: SHIPPED | OUTPATIENT
Start: 2025-05-06

## 2025-05-06 RX ORDER — GLIPIZIDE 10 MG/1
10 TABLET, FILM COATED, EXTENDED RELEASE ORAL DAILY
Qty: 90 TABLET | Refills: 3 | Status: CANCELLED | OUTPATIENT
Start: 2025-05-06

## 2025-05-06 RX ORDER — PREGABALIN 25 MG/1
25 CAPSULE ORAL NIGHTLY
Qty: 90 CAPSULE | Refills: 0 | Status: SHIPPED | OUTPATIENT
Start: 2025-05-06

## 2025-05-06 RX ORDER — ASPIRIN 81 MG
1 TABLET,CHEWABLE ORAL 2 TIMES DAILY PRN
Qty: 60 G | Refills: 0 | Status: SHIPPED | OUTPATIENT
Start: 2025-05-06

## 2025-05-29 ENCOUNTER — TELEPHONE (OUTPATIENT)
Dept: INTERNAL MEDICINE CLINIC | Facility: CLINIC | Age: 75
End: 2025-05-29

## 2025-05-29 RX ORDER — LISINOPRIL 20 MG/1
20 TABLET ORAL 2 TIMES DAILY
Qty: 180 TABLET | Refills: 1 | Status: SHIPPED | OUTPATIENT
Start: 2025-05-29

## 2025-05-29 NOTE — TELEPHONE ENCOUNTER
Please review: medication fails/has no protocol attached.    Patient of [facility] (Dr. Aaron Helton) - patient's care is being transitioned to Dr. Rolanda Chance.    No future appointments with primary care medicine.    Last office visit: 5/6/2025 (annual physical exam)

## 2025-05-30 RX ORDER — METOPROLOL SUCCINATE 100 MG/1
100 TABLET, EXTENDED RELEASE ORAL 2 TIMES DAILY
Qty: 180 TABLET | Refills: 3 | Status: SHIPPED | OUTPATIENT
Start: 2025-05-30

## 2025-06-05 ENCOUNTER — LAB ENCOUNTER (OUTPATIENT)
Dept: LAB | Age: 75
End: 2025-06-05
Attending: INTERNAL MEDICINE
Payer: MEDICARE

## 2025-06-05 DIAGNOSIS — Z12.5 SCREENING FOR PROSTATE CANCER: ICD-10-CM

## 2025-06-05 DIAGNOSIS — Z00.00 ANNUAL PHYSICAL EXAM: ICD-10-CM

## 2025-06-05 DIAGNOSIS — Z13.89 SCREENING FOR NEPHROPATHY: ICD-10-CM

## 2025-06-05 DIAGNOSIS — E53.8 VITAMIN B12 DEFICIENCY: ICD-10-CM

## 2025-06-05 DIAGNOSIS — E78.5 HYPERLIPIDEMIA, UNSPECIFIED HYPERLIPIDEMIA TYPE: ICD-10-CM

## 2025-06-05 DIAGNOSIS — Z13.0 SCREENING FOR DEFICIENCY ANEMIA: ICD-10-CM

## 2025-06-05 DIAGNOSIS — Z13.29 SCREENING FOR THYROID DISORDER: ICD-10-CM

## 2025-06-05 DIAGNOSIS — E11.9 TYPE 2 DIABETES MELLITUS WITHOUT COMPLICATION, WITHOUT LONG-TERM CURRENT USE OF INSULIN (HCC): ICD-10-CM

## 2025-06-05 LAB
ALBUMIN SERPL-MCNC: 4.8 G/DL (ref 3.2–4.8)
ALBUMIN/GLOB SERPL: 1.9 {RATIO} (ref 1–2)
ALP LIVER SERPL-CCNC: 81 U/L (ref 45–117)
ALT SERPL-CCNC: 11 U/L (ref 10–49)
ANION GAP SERPL CALC-SCNC: 8 MMOL/L (ref 0–18)
AST SERPL-CCNC: 16 U/L (ref ?–34)
BASOPHILS # BLD AUTO: 0.07 X10(3) UL (ref 0–0.2)
BASOPHILS NFR BLD AUTO: 1.1 %
BILIRUB SERPL-MCNC: 0.6 MG/DL (ref 0.2–1.1)
BUN BLD-MCNC: 38 MG/DL (ref 9–23)
BUN/CREAT SERPL: 21 (ref 10–20)
CALCIUM BLD-MCNC: 9.5 MG/DL (ref 8.7–10.4)
CHLORIDE SERPL-SCNC: 107 MMOL/L (ref 98–112)
CHOLEST SERPL-MCNC: 162 MG/DL (ref ?–200)
CO2 SERPL-SCNC: 23 MMOL/L (ref 21–32)
COMPLEXED PSA SERPL-MCNC: 1.6 NG/ML (ref ?–4)
CREAT BLD-MCNC: 1.81 MG/DL (ref 0.7–1.3)
CREAT UR-SCNC: 15.9 MG/DL
DEPRECATED RDW RBC AUTO: 43.4 FL (ref 35.1–46.3)
EGFRCR SERPLBLD CKD-EPI 2021: 39 ML/MIN/1.73M2 (ref 60–?)
EOSINOPHIL # BLD AUTO: 0.28 X10(3) UL (ref 0–0.7)
EOSINOPHIL NFR BLD AUTO: 4.5 %
ERYTHROCYTE [DISTWIDTH] IN BLOOD BY AUTOMATED COUNT: 14.1 % (ref 11–15)
EST. AVERAGE GLUCOSE BLD GHB EST-MCNC: 174 MG/DL (ref 68–126)
FASTING PATIENT LIPID ANSWER: YES
FASTING STATUS PATIENT QL REPORTED: YES
GLOBULIN PLAS-MCNC: 2.5 G/DL (ref 2–3.5)
GLUCOSE BLD-MCNC: 161 MG/DL (ref 70–99)
HBA1C MFR BLD: 7.7 % (ref ?–5.7)
HCT VFR BLD AUTO: 39.2 % (ref 39–53)
HDLC SERPL-MCNC: 40 MG/DL (ref 40–59)
HGB BLD-MCNC: 13.2 G/DL (ref 13–17.5)
IMM GRANULOCYTES # BLD AUTO: 0.02 X10(3) UL (ref 0–1)
IMM GRANULOCYTES NFR BLD: 0.3 %
LDLC SERPL CALC-MCNC: 105 MG/DL (ref ?–100)
LYMPHOCYTES # BLD AUTO: 0.81 X10(3) UL (ref 1–4)
LYMPHOCYTES NFR BLD AUTO: 13 %
MCH RBC QN AUTO: 28.4 PG (ref 26–34)
MCHC RBC AUTO-ENTMCNC: 33.7 G/DL (ref 31–37)
MCV RBC AUTO: 84.5 FL (ref 80–100)
MICROALBUMIN UR-MCNC: <0.3 MG/DL
MONOCYTES # BLD AUTO: 0.44 X10(3) UL (ref 0.1–1)
MONOCYTES NFR BLD AUTO: 7.1 %
NEUTROPHILS # BLD AUTO: 4.6 X10 (3) UL (ref 1.5–7.7)
NEUTROPHILS # BLD AUTO: 4.6 X10(3) UL (ref 1.5–7.7)
NEUTROPHILS NFR BLD AUTO: 74 %
NONHDLC SERPL-MCNC: 122 MG/DL (ref ?–130)
OSMOLALITY SERPL CALC.SUM OF ELEC: 299 MOSM/KG (ref 275–295)
PLATELET # BLD AUTO: 180 10(3)UL (ref 150–450)
PLATELETS.RETICULATED NFR BLD AUTO: 9.8 % (ref 0–7)
POTASSIUM SERPL-SCNC: 4.9 MMOL/L (ref 3.5–5.1)
PROT SERPL-MCNC: 7.3 G/DL (ref 5.7–8.2)
RBC # BLD AUTO: 4.64 X10(6)UL (ref 3.8–5.8)
SODIUM SERPL-SCNC: 138 MMOL/L (ref 136–145)
TRIGL SERPL-MCNC: 93 MG/DL (ref 30–149)
TSI SER-ACNC: 1.43 UIU/ML (ref 0.55–4.78)
VIT B12 SERPL-MCNC: 469 PG/ML (ref 211–911)
VLDLC SERPL CALC-MCNC: 16 MG/DL (ref 0–30)
WBC # BLD AUTO: 6.2 X10(3) UL (ref 4–11)

## 2025-06-05 PROCEDURE — 83036 HEMOGLOBIN GLYCOSYLATED A1C: CPT

## 2025-06-05 PROCEDURE — 80061 LIPID PANEL: CPT

## 2025-06-05 PROCEDURE — 82043 UR ALBUMIN QUANTITATIVE: CPT

## 2025-06-05 PROCEDURE — 82570 ASSAY OF URINE CREATININE: CPT

## 2025-06-05 PROCEDURE — 80053 COMPREHEN METABOLIC PANEL: CPT

## 2025-06-05 PROCEDURE — 82607 VITAMIN B-12: CPT

## 2025-06-05 PROCEDURE — 36415 COLL VENOUS BLD VENIPUNCTURE: CPT

## 2025-06-05 PROCEDURE — 85025 COMPLETE CBC W/AUTO DIFF WBC: CPT

## 2025-06-05 PROCEDURE — 84443 ASSAY THYROID STIM HORMONE: CPT

## 2025-06-10 ENCOUNTER — TELEPHONE (OUTPATIENT)
Dept: INTERNAL MEDICINE CLINIC | Facility: CLINIC | Age: 75
End: 2025-06-10

## 2025-06-10 RX ORDER — GLIPIZIDE 10 MG/1
10 TABLET ORAL
Qty: 180 TABLET | Refills: 3 | Status: SHIPPED | OUTPATIENT
Start: 2025-06-10

## 2025-06-10 NOTE — TELEPHONE ENCOUNTER
Please notify the patient reviewed the blood work from 6/5  This show has been a spike in his sugar levels his A1c went from 7.0% up to 7.7%.  In addition to reducing down carbohydrates, maintaining good physical activity, I would like him to increase his glipizide 10 mg twice a day.  Should et us know if he needs a refill of this medication    Kidney function did show a little bit of dehydration.  But I think this is from the fasting state, so lets maintain general water intake without overloading the legs.

## 2025-06-10 NOTE — TELEPHONE ENCOUNTER
FYI pool, looks like the last prescription was glipizid immediate release, I sent the refill 10 mg twice a day.  But no further action needed okay to close encounter now

## 2025-06-27 ENCOUNTER — HOSPITAL ENCOUNTER (OUTPATIENT)
Dept: CT IMAGING | Facility: HOSPITAL | Age: 75
Discharge: HOME | End: 2025-06-27
Attending: RADIOLOGY
Payer: MEDICARE

## 2025-06-27 DIAGNOSIS — I73.9 PAD (PERIPHERAL ARTERY DISEASE): ICD-10-CM

## 2025-06-27 PROCEDURE — 73706 CT ANGIO LWR EXTR W/O&W/DYE: CPT | Performed by: RADIOLOGY

## 2025-06-30 DIAGNOSIS — I73.9 PAD (PERIPHERAL ARTERY DISEASE): Primary | ICD-10-CM

## 2025-06-30 RX ORDER — SODIUM CHLORIDE 9 MG/ML
1 INJECTION, SOLUTION INTRAVENOUS CONTINUOUS
Status: CANCELLED | OUTPATIENT
Start: 2025-06-30

## 2025-07-07 ENCOUNTER — HOSPITAL ENCOUNTER (OUTPATIENT)
Dept: INTERVENTIONAL RADIOLOGY/VASCULAR | Facility: HOSPITAL | Age: 75
Discharge: HOME | End: 2025-07-07
Attending: RADIOLOGY | Admitting: RADIOLOGY
Payer: MEDICARE

## 2025-07-07 VITALS
RESPIRATION RATE: 13 BRPM | BODY MASS INDEX: 28.93 KG/M2 | HEIGHT: 66.5 IN | HEART RATE: 50 BPM | DIASTOLIC BLOOD PRESSURE: 84 MMHG | SYSTOLIC BLOOD PRESSURE: 129 MMHG | WEIGHT: 182.19 LBS | OXYGEN SATURATION: 99 % | TEMPERATURE: 98 F

## 2025-07-07 DIAGNOSIS — I73.9 PAD (PERIPHERAL ARTERY DISEASE): ICD-10-CM

## 2025-07-07 LAB
ANION GAP SERPL CALC-SCNC: 8 MMOL/L (ref 0–18)
BUN BLD-MCNC: 28 MG/DL (ref 9–23)
BUN/CREAT SERPL: 17.1 (ref 10–20)
CALCIUM BLD-MCNC: 9.2 MG/DL (ref 8.7–10.4)
CHLORIDE SERPL-SCNC: 108 MMOL/L (ref 98–112)
CO2 SERPL-SCNC: 22 MMOL/L (ref 21–32)
CREAT BLD-MCNC: 1.64 MG/DL (ref 0.7–1.3)
EGFRCR SERPLBLD CKD-EPI 2021: 44 ML/MIN/1.73M2 (ref 60–?)
FASTING STATUS PATIENT QL REPORTED: YES
GLUCOSE BLD-MCNC: 184 MG/DL (ref 70–99)
GLUCOSE BLDC GLUCOMTR-MCNC: 178 MG/DL (ref 70–99)
ISTAT ACTIVATED CLOTTING TIME: 222 SECONDS (ref 125–137)
OSMOLALITY SERPL CALC.SUM OF ELEC: 296 MOSM/KG (ref 275–295)
POTASSIUM SERPL-SCNC: 4.8 MMOL/L (ref 3.5–5.1)
SODIUM SERPL-SCNC: 138 MMOL/L (ref 136–145)

## 2025-07-07 PROCEDURE — 99153 MOD SED SAME PHYS/QHP EA: CPT | Performed by: RADIOLOGY

## 2025-07-07 PROCEDURE — 82962 GLUCOSE BLOOD TEST: CPT

## 2025-07-07 PROCEDURE — 76937 US GUIDE VASCULAR ACCESS: CPT | Performed by: RADIOLOGY

## 2025-07-07 PROCEDURE — 80048 BASIC METABOLIC PNL TOTAL CA: CPT | Performed by: RADIOLOGY

## 2025-07-07 PROCEDURE — 99152 MOD SED SAME PHYS/QHP 5/>YRS: CPT | Performed by: RADIOLOGY

## 2025-07-07 PROCEDURE — 85347 COAGULATION TIME ACTIVATED: CPT

## 2025-07-07 RX ORDER — LIDOCAINE HYDROCHLORIDE 20 MG/ML
INJECTION, SOLUTION EPIDURAL; INFILTRATION; INTRACAUDAL; PERINEURAL
Status: COMPLETED
Start: 2025-07-07 | End: 2025-07-07

## 2025-07-07 RX ORDER — HEPARIN SODIUM 1000 [USP'U]/ML
INJECTION, SOLUTION INTRAVENOUS; SUBCUTANEOUS
Status: COMPLETED
Start: 2025-07-07 | End: 2025-07-07

## 2025-07-07 RX ORDER — IOPAMIDOL 612 MG/ML
200 INJECTION, SOLUTION INTRAVASCULAR
Status: COMPLETED | OUTPATIENT
Start: 2025-07-07 | End: 2025-07-07

## 2025-07-07 RX ORDER — SODIUM CHLORIDE 9 MG/ML
INJECTION, SOLUTION INTRAVENOUS CONTINUOUS
Status: DISCONTINUED | OUTPATIENT
Start: 2025-07-07 | End: 2025-07-07

## 2025-07-07 RX ORDER — MIDAZOLAM HYDROCHLORIDE 1 MG/ML
INJECTION INTRAMUSCULAR; INTRAVENOUS
Status: COMPLETED
Start: 2025-07-07 | End: 2025-07-07

## 2025-07-07 RX ADMIN — IOPAMIDOL 85 ML: 612 INJECTION, SOLUTION INTRAVASCULAR at 09:22:00

## 2025-07-07 NOTE — H&P
History & Physical Examination    Patient Name: Geovanny Prabhakar  MRN: [id number]  [facility]: [id number]  YOB: 1950    Diagnosis: PAD    Present Illness: 73 yo with PAD and claudication, rest pain L leg    Prescriptions Prior to Admission[1]  Current Hospital Medications[2]    Allergies: Allergies[3]    Past Medical History[4]  Past Surgical History[5]  Family History[6]  Social History     Tobacco Use    Smoking status: Former    Smokeless tobacco: Never   Substance Use Topics    Alcohol use: No     Alcohol/week: 0.0 standard drinks of alcohol       SYSTEM Check if Review is Normal Check if Physical Exam is Normal If not normal, please explain:   HEENT [x ] [x ]    NECK & BACK [x ] [x ]    HEART [x ] [x ]    LUNGS [x ] [ x]    ABDOMEN [x ] [ x]    UROGENITAL [x ] [x ]    EXTREMITIES [x ] [ x] DP.PT weak Doppler on L   OTHER        [ x ] I have discussed the risks and benefits and alternatives with the patient/family.  They understand and agree to proceed with plan of care.  [ x ] I have reviewed the History and Physical done within the last 30 days.  Any changes noted above.    Taiwo Greenfield MD  7/7/2025  6:37 PM           [1]   No medications prior to admission.   [2]   No current facility-administered medications for this encounter.   [3] No Known Allergies  [4]   Past Medical History:   Atherosclerosis of coronary artery    CAD (coronary artery disease)    Cancer (HCC)    Colon cancer (HCC)    COVID-19 virus infection    Oct 2020    Diabetes (HCC)    DM2 (diabetes mellitus, type 2) (HCC)    Essential hypertension    HTN (hypertension)    Hyperlipidemia    Other abnormal glucose    rubber band hemorroid    Paroxysmal atrial fibrillation (HCC)   [5]   Past Surgical History:  Procedure Laterality Date    Cardiac pacemaker placement      Other      MAZE    Other      CABAG    Other      appendectomy    Other      pacer    Other       Status post hemicolectomy, July 2019   [6] No family history on file.

## 2025-07-07 NOTE — DISCHARGE INSTRUCTIONS
INTERVENTIONAL RADIOLOGY  [facility]  [phone]     Patient Name:  Geovanny Prabhakar    Procedure:  Left Lower Extremity Angiogram with Intervention    Site Care: Remove your band-aid or dressing in 24 hours.  Gently wash area with soap and water.                                       Activity/Diet  No heavy lifting or strenuous activity for 48 hours.  Drink plenty of fluids, unless you have otherwise been told to restrict your fluid intake.  Do not drink alcohol for 24 hours.  Do not drive,  operate heavy machinery, make important decisions or sign legal documents today.    Medications:  Take acetaminophen if needed for pain. Do not exceed 4000mg of acetaminophen in a 24-hour period. and Make no changes to your existing medications.    Contact Interventional Radiology at [phone] if you have severe/unrelieved pain, fever, chills, dizziness/lightheadedness, or drainage/bleeding from your incision site.    Peripheral Angiogram Home Care Instructions     You may shower on the day following your procedure.  Do not take a tub bath or submerge the puncture site in water for 3 days following the procedure.   The dressing on your puncture site may be removed after 24 hours and replaced with a Band-Aid or left open to air.   Do not drive a car for 24 hours.   No strenuous exercise, lifting greater than 10 pounds or physical activity (tennis, golf, etc..) for 48 hours.  Do not bend, squat, or do repetitive movements.    Take your medications as directed.    If bleeding should occur:  1. Lie down and apply firm pressure to the site with your fingers for 10 minutes.  2. If the bleeding stops, continue to lie still, keeping your leg straight for 2 hours.  3. If the bleeding does not stop after 10 minutes or if there is a large amount of bleeding, call 911 immediately.  Do not drive yourself to the hospital.    You may experience mild tingling, bruising, and soreness to the  groin following your procedure which may persist for 3 days.    Notify your physician immediately if other symptoms develop, including, but not limited to:   Change in color or temperature of the extremity   Redness, heat, or pus at the puncture site   Chills or fever greater than 100.4 degrees F

## 2025-07-07 NOTE — IVS NOTE
DISCHARGE NOTE     Pt is able to sit up and ambulate without difficulty.   Pt voided and tolerated fluids.  Procedural site remains dry and intact with good circulation, motion, and sensation.   No signs and symptoms of bleeding/hematoma noted.   IV access removed. Band-aid applied.   Instruction provided, patient/family verbalizes understanding.   Dr. Greenfield spoke with patient/family post procedure.     Pt discharge via wheelchair to [facility]. Pt's wife is driving patient home.    Follow up Appointment: To be made in 2 weeks.    No medication changes   
Discharged

## 2025-07-15 ENCOUNTER — HOSPITAL ENCOUNTER (INPATIENT)
Facility: HOSPITAL | Age: 75
LOS: 2 days | Discharge: HOME | DRG: 254 | End: 2025-07-17
Attending: EMERGENCY MEDICINE | Admitting: HOSPITALIST
Payer: MEDICARE

## 2025-07-15 ENCOUNTER — HOSPITAL ENCOUNTER (INPATIENT)
Facility: HOSPITAL | Age: 75
LOS: 2 days | Discharge: HOME | End: 2025-07-17
Attending: EMERGENCY MEDICINE | Admitting: HOSPITALIST
Payer: MEDICARE

## 2025-07-15 ENCOUNTER — HOSPITAL ENCOUNTER (OUTPATIENT)
Dept: ULTRASOUND IMAGING | Facility: HOSPITAL | Age: 75
Discharge: HOME | End: 2025-07-15
Attending: RADIOLOGY
Payer: MEDICARE

## 2025-07-15 ENCOUNTER — APPOINTMENT (OUTPATIENT)
Dept: CT IMAGING | Facility: HOSPITAL | Age: 75
End: 2025-07-15
Attending: EMERGENCY MEDICINE
Payer: MEDICARE

## 2025-07-15 ENCOUNTER — APPOINTMENT (OUTPATIENT)
Dept: CT IMAGING | Facility: HOSPITAL | Age: 75
DRG: 254 | End: 2025-07-15
Attending: EMERGENCY MEDICINE
Payer: MEDICARE

## 2025-07-15 ENCOUNTER — HOSPITAL ENCOUNTER (OUTPATIENT)
Dept: ULTRASOUND IMAGING | Facility: HOSPITAL | Age: 75
Discharge: HOME | DRG: 254 | End: 2025-07-15
Attending: RADIOLOGY
Payer: MEDICARE

## 2025-07-15 DIAGNOSIS — I70.201 FEMORAL ARTERY OCCLUSION, RIGHT: Primary | ICD-10-CM

## 2025-07-15 DIAGNOSIS — I73.9 PAD (PERIPHERAL ARTERY DISEASE): ICD-10-CM

## 2025-07-15 DIAGNOSIS — M79.4 HYPERTROPHY OF FAT PAD OF RIGHT KNEE: Primary | ICD-10-CM

## 2025-07-15 DIAGNOSIS — I74.3 FEMORAL ARTERY THROMBOSIS, RIGHT (HCC): ICD-10-CM

## 2025-07-15 LAB
ANION GAP SERPL CALC-SCNC: 11 MMOL/L (ref 0–18)
APTT PPP: 32 SECONDS (ref 23–36)
BASOPHILS # BLD AUTO: 0.11 X10(3) UL (ref 0–0.2)
BASOPHILS NFR BLD AUTO: 1.4 %
BUN BLD-MCNC: 31 MG/DL (ref 9–23)
BUN/CREAT SERPL: 17.2 (ref 10–20)
CALCIUM BLD-MCNC: 9.8 MG/DL (ref 8.7–10.4)
CHLORIDE SERPL-SCNC: 104 MMOL/L (ref 98–112)
CO2 SERPL-SCNC: 25 MMOL/L (ref 21–32)
CREAT BLD-MCNC: 1.8 MG/DL (ref 0.7–1.3)
DEPRECATED RDW RBC AUTO: 44 FL (ref 35.1–46.3)
EGFRCR SERPLBLD CKD-EPI 2021: 39 ML/MIN/1.73M2 (ref 60–?)
EOSINOPHIL # BLD AUTO: 0.69 X10(3) UL (ref 0–0.7)
EOSINOPHIL NFR BLD AUTO: 8.6 %
ERYTHROCYTE [DISTWIDTH] IN BLOOD BY AUTOMATED COUNT: 14.5 % (ref 11–15)
GLUCOSE BLD-MCNC: 91 MG/DL (ref 70–99)
GLUCOSE BLDC GLUCOMTR-MCNC: 191 MG/DL (ref 70–99)
GLUCOSE BLDC GLUCOMTR-MCNC: 69 MG/DL (ref 70–99)
GLUCOSE BLDC GLUCOMTR-MCNC: 83 MG/DL (ref 70–99)
HCT VFR BLD AUTO: 41.7 % (ref 39–53)
HGB BLD-MCNC: 14 G/DL (ref 13–17.5)
IMM GRANULOCYTES # BLD AUTO: 0.02 X10(3) UL (ref 0–1)
IMM GRANULOCYTES NFR BLD: 0.2 %
INR BLD: 1 (ref 0.8–1.2)
LYMPHOCYTES # BLD AUTO: 1.15 X10(3) UL (ref 1–4)
LYMPHOCYTES NFR BLD AUTO: 14.4 %
MCH RBC QN AUTO: 28.3 PG (ref 26–34)
MCHC RBC AUTO-ENTMCNC: 33.6 G/DL (ref 31–37)
MCV RBC AUTO: 84.2 FL (ref 80–100)
MONOCYTES # BLD AUTO: 0.64 X10(3) UL (ref 0.1–1)
MONOCYTES NFR BLD AUTO: 8 %
NEUTROPHILS # BLD AUTO: 5.4 X10 (3) UL (ref 1.5–7.7)
NEUTROPHILS # BLD AUTO: 5.4 X10(3) UL (ref 1.5–7.7)
NEUTROPHILS NFR BLD AUTO: 67.4 %
OSMOLALITY SERPL CALC.SUM OF ELEC: 296 MOSM/KG (ref 275–295)
PLATELET # BLD AUTO: 201 10(3)UL (ref 150–450)
POTASSIUM SERPL-SCNC: 5.2 MMOL/L (ref 3.5–5.1)
PROTHROMBIN TIME: 13.8 SECONDS (ref 11.6–14.8)
RBC # BLD AUTO: 4.95 X10(6)UL (ref 3.8–5.8)
SODIUM SERPL-SCNC: 140 MMOL/L (ref 136–145)
WBC # BLD AUTO: 8 X10(3) UL (ref 4–11)

## 2025-07-15 PROCEDURE — 75635 CT ANGIO ABDOMINAL ARTERIES: CPT | Performed by: RADIOLOGY

## 2025-07-15 PROCEDURE — 99223 1ST HOSP IP/OBS HIGH 75: CPT | Performed by: HOSPITALIST

## 2025-07-15 PROCEDURE — 93926 LOWER EXTREMITY STUDY: CPT | Performed by: RADIOLOGY

## 2025-07-15 RX ORDER — TEMAZEPAM 15 MG/1
15 CAPSULE ORAL NIGHTLY PRN
Status: DISCONTINUED | OUTPATIENT
Start: 2025-07-15 | End: 2025-07-17

## 2025-07-15 RX ORDER — NICOTINE POLACRILEX 4 MG
30 LOZENGE BUCCAL
Status: DISCONTINUED | OUTPATIENT
Start: 2025-07-15 | End: 2025-07-17

## 2025-07-15 RX ORDER — MORPHINE SULFATE 2 MG/ML
1 INJECTION, SOLUTION INTRAMUSCULAR; INTRAVENOUS EVERY 2 HOUR PRN
Status: DISCONTINUED | OUTPATIENT
Start: 2025-07-15 | End: 2025-07-17

## 2025-07-15 RX ORDER — ACETAMINOPHEN 500 MG
500 TABLET ORAL EVERY 4 HOURS PRN
Status: DISCONTINUED | OUTPATIENT
Start: 2025-07-15 | End: 2025-07-16

## 2025-07-15 RX ORDER — ASPIRIN 81 MG/1
81 TABLET ORAL NIGHTLY
Status: DISCONTINUED | OUTPATIENT
Start: 2025-07-15 | End: 2025-07-17

## 2025-07-15 RX ORDER — SODIUM CHLORIDE 9 MG/ML
1 INJECTION, SOLUTION INTRAVENOUS CONTINUOUS
Status: DISCONTINUED | OUTPATIENT
Start: 2025-07-15 | End: 2025-07-15

## 2025-07-15 RX ORDER — HEPARIN SODIUM 1000 [USP'U]/ML
80 INJECTION, SOLUTION INTRAVENOUS; SUBCUTANEOUS ONCE
Status: COMPLETED | OUTPATIENT
Start: 2025-07-15 | End: 2025-07-15

## 2025-07-15 RX ORDER — HEPARIN SODIUM AND DEXTROSE 10000; 5 [USP'U]/100ML; G/100ML
18 INJECTION INTRAVENOUS ONCE
Status: COMPLETED | OUTPATIENT
Start: 2025-07-15 | End: 2025-07-16

## 2025-07-15 RX ORDER — MORPHINE SULFATE 2 MG/ML
2 INJECTION, SOLUTION INTRAMUSCULAR; INTRAVENOUS EVERY 2 HOUR PRN
Status: DISCONTINUED | OUTPATIENT
Start: 2025-07-15 | End: 2025-07-17

## 2025-07-15 RX ORDER — NICOTINE POLACRILEX 4 MG
15 LOZENGE BUCCAL
Status: DISCONTINUED | OUTPATIENT
Start: 2025-07-15 | End: 2025-07-17

## 2025-07-15 RX ORDER — HYDRALAZINE HYDROCHLORIDE 20 MG/ML
10 INJECTION INTRAMUSCULAR; INTRAVENOUS EVERY 6 HOURS PRN
Status: DISCONTINUED | OUTPATIENT
Start: 2025-07-15 | End: 2025-07-17

## 2025-07-15 RX ORDER — SODIUM CHLORIDE 9 MG/ML
INJECTION, SOLUTION INTRAVENOUS CONTINUOUS
Status: DISCONTINUED | OUTPATIENT
Start: 2025-07-15 | End: 2025-07-17

## 2025-07-15 RX ORDER — ONDANSETRON 2 MG/ML
4 INJECTION INTRAMUSCULAR; INTRAVENOUS EVERY 6 HOURS PRN
Status: DISCONTINUED | OUTPATIENT
Start: 2025-07-15 | End: 2025-07-16

## 2025-07-15 RX ORDER — ALPRAZOLAM 0.25 MG
0.5 TABLET ORAL 2 TIMES DAILY PRN
Status: DISCONTINUED | OUTPATIENT
Start: 2025-07-15 | End: 2025-07-17

## 2025-07-15 RX ORDER — LISINOPRIL 20 MG/1
20 TABLET ORAL 2 TIMES DAILY
Status: DISCONTINUED | OUTPATIENT
Start: 2025-07-15 | End: 2025-07-17

## 2025-07-15 RX ORDER — ACETAMINOPHEN 500 MG
1000 TABLET ORAL 3 TIMES DAILY PRN
COMMUNITY

## 2025-07-15 RX ORDER — DEXTROSE MONOHYDRATE 25 G/50ML
50 INJECTION, SOLUTION INTRAVENOUS
Status: DISCONTINUED | OUTPATIENT
Start: 2025-07-15 | End: 2025-07-17

## 2025-07-15 RX ORDER — METOPROLOL SUCCINATE 100 MG/1
100 TABLET, EXTENDED RELEASE ORAL 2 TIMES DAILY
Status: DISCONTINUED | OUTPATIENT
Start: 2025-07-15 | End: 2025-07-17

## 2025-07-15 RX ORDER — HEPARIN SODIUM AND DEXTROSE 10000; 5 [USP'U]/100ML; G/100ML
INJECTION INTRAVENOUS CONTINUOUS
Status: DISCONTINUED | OUTPATIENT
Start: 2025-07-15 | End: 2025-07-16

## 2025-07-15 RX ORDER — MORPHINE SULFATE 4 MG/ML
4 INJECTION, SOLUTION INTRAMUSCULAR; INTRAVENOUS EVERY 2 HOUR PRN
Status: DISCONTINUED | OUTPATIENT
Start: 2025-07-15 | End: 2025-07-17

## 2025-07-15 RX ORDER — METOCLOPRAMIDE HYDROCHLORIDE 5 MG/ML
5 INJECTION INTRAMUSCULAR; INTRAVENOUS EVERY 8 HOURS PRN
Status: DISCONTINUED | OUTPATIENT
Start: 2025-07-15 | End: 2025-07-17

## 2025-07-15 NOTE — ED INITIAL ASSESSMENT (HPI)
Patient arrived via IR. Patient seen today by IR. US preformed patient presents with poor vascular perfusion to right leg. Angioplasty preformed Friday to left leg.

## 2025-07-15 NOTE — ED QUICK NOTES
Orders for admission, patient is aware of plan and ready to go upstairs. Any questions, please call ED RN Kerry at extension [phone].     Patient Covid vaccination status: Fully vaccinated     COVID Test Ordered in ED: None    COVID Suspicion at Admission: N/A    Running Infusions: Medication Infusions[1] None    Mental Status/LOC at time of transport: aox4    Other pertinent information:   CIWA score: N/A   NIH score:  N/A             [1]    continuous dose heparin

## 2025-07-16 ENCOUNTER — ANESTHESIA (OUTPATIENT)
Dept: SURGERY | Facility: HOSPITAL | Age: 75
End: 2025-07-16
Payer: MEDICARE

## 2025-07-16 ENCOUNTER — ANESTHESIA EVENT (OUTPATIENT)
Dept: SURGERY | Facility: HOSPITAL | Age: 75
End: 2025-07-16
Payer: MEDICARE

## 2025-07-16 LAB
ANION GAP SERPL CALC-SCNC: 10 MMOL/L (ref 0–18)
ANTIBODY SCREEN: NEGATIVE
APTT PPP: 130.7 SECONDS (ref 23–36)
APTT PPP: 31.4 SECONDS (ref 23–36)
ATRIAL RATE: 38 BPM
BASOPHILS # BLD AUTO: 0.08 X10(3) UL (ref 0–0.2)
BASOPHILS NFR BLD AUTO: 1.3 %
BUN BLD-MCNC: 27 MG/DL (ref 9–23)
BUN/CREAT SERPL: 17.3 (ref 10–20)
CALCIUM BLD-MCNC: 9 MG/DL (ref 8.7–10.4)
CHLORIDE SERPL-SCNC: 107 MMOL/L (ref 98–112)
CO2 SERPL-SCNC: 22 MMOL/L (ref 21–32)
CREAT BLD-MCNC: 1.56 MG/DL (ref 0.7–1.3)
DEPRECATED RDW RBC AUTO: 43.6 FL (ref 35.1–46.3)
EGFRCR SERPLBLD CKD-EPI 2021: 47 ML/MIN/1.73M2 (ref 60–?)
EOSINOPHIL # BLD AUTO: 0.5 X10(3) UL (ref 0–0.7)
EOSINOPHIL NFR BLD AUTO: 7.9 %
ERYTHROCYTE [DISTWIDTH] IN BLOOD BY AUTOMATED COUNT: 14.4 % (ref 11–15)
GLUCOSE BLD-MCNC: 132 MG/DL (ref 70–99)
GLUCOSE BLDC GLUCOMTR-MCNC: 104 MG/DL (ref 70–99)
GLUCOSE BLDC GLUCOMTR-MCNC: 111 MG/DL (ref 70–99)
GLUCOSE BLDC GLUCOMTR-MCNC: 141 MG/DL (ref 70–99)
GLUCOSE BLDC GLUCOMTR-MCNC: 147 MG/DL (ref 70–99)
GLUCOSE BLDC GLUCOMTR-MCNC: 181 MG/DL (ref 70–99)
HCT VFR BLD AUTO: 35.7 % (ref 39–53)
HGB BLD-MCNC: 12.2 G/DL (ref 13–17.5)
IMM GRANULOCYTES # BLD AUTO: 0.01 X10(3) UL (ref 0–1)
IMM GRANULOCYTES NFR BLD: 0.2 %
LYMPHOCYTES # BLD AUTO: 0.77 X10(3) UL (ref 1–4)
LYMPHOCYTES NFR BLD AUTO: 12.2 %
MCH RBC QN AUTO: 28.6 PG (ref 26–34)
MCHC RBC AUTO-ENTMCNC: 34.2 G/DL (ref 31–37)
MCV RBC AUTO: 83.8 FL (ref 80–100)
MONOCYTES # BLD AUTO: 0.52 X10(3) UL (ref 0.1–1)
MONOCYTES NFR BLD AUTO: 8.2 %
NEUTROPHILS # BLD AUTO: 4.44 X10 (3) UL (ref 1.5–7.7)
NEUTROPHILS # BLD AUTO: 4.44 X10(3) UL (ref 1.5–7.7)
NEUTROPHILS NFR BLD AUTO: 70.2 %
OSMOLALITY SERPL CALC.SUM OF ELEC: 295 MOSM/KG (ref 275–295)
PLATELET # BLD AUTO: 161 10(3)UL (ref 150–450)
POTASSIUM SERPL-SCNC: 4.2 MMOL/L (ref 3.5–5.1)
Q-T INTERVAL: 510 MS
QRS DURATION: 188 MS
QTC CALCULATION (BEZET): 492 MS
R AXIS: -78 DEGREES
RBC # BLD AUTO: 4.26 X10(6)UL (ref 3.8–5.8)
RH BLOOD TYPE: POSITIVE
RH BLOOD TYPE: POSITIVE
SODIUM SERPL-SCNC: 139 MMOL/L (ref 136–145)
T AXIS: 76 DEGREES
VENTRICULAR RATE: 56 BPM
WBC # BLD AUTO: 6.3 X10(3) UL (ref 4–11)

## 2025-07-16 PROCEDURE — 35371 RECHANNELING OF ARTERY: CPT | Performed by: SURGERY

## 2025-07-16 PROCEDURE — 99233 SBSQ HOSP IP/OBS HIGH 50: CPT | Performed by: HOSPITALIST

## 2025-07-16 PROCEDURE — 04UK0KZ SUPPLEMENT RIGHT FEMORAL ARTERY WITH NONAUTOLOGOUS TISSUE SUBSTITUTE, OPEN APPROACH: ICD-10-PCS | Performed by: SURGERY

## 2025-07-16 PROCEDURE — 99223 1ST HOSP IP/OBS HIGH 75: CPT | Performed by: SURGERY

## 2025-07-16 PROCEDURE — 04CK0ZZ EXTIRPATION OF MATTER FROM RIGHT FEMORAL ARTERY, OPEN APPROACH: ICD-10-PCS | Performed by: SURGERY

## 2025-07-16 DEVICE — IMPLANTABLE DEVICE: Type: IMPLANTABLE DEVICE | Status: FUNCTIONAL

## 2025-07-16 RX ORDER — HYDROCODONE BITARTRATE AND ACETAMINOPHEN 5; 325 MG/1; MG/1
1 TABLET ORAL EVERY 4 HOURS PRN
Status: DISCONTINUED | OUTPATIENT
Start: 2025-07-16 | End: 2025-07-17

## 2025-07-16 RX ORDER — DEXAMETHASONE SODIUM PHOSPHATE 4 MG/ML
VIAL (ML) INJECTION AS NEEDED
Status: DISCONTINUED | OUTPATIENT
Start: 2025-07-16 | End: 2025-07-16 | Stop reason: SURG

## 2025-07-16 RX ORDER — PROTAMINE SULFATE 10 MG/ML
INJECTION, SOLUTION INTRAVENOUS AS NEEDED
Status: DISCONTINUED | OUTPATIENT
Start: 2025-07-16 | End: 2025-07-16 | Stop reason: SURG

## 2025-07-16 RX ORDER — HYDROMORPHONE HYDROCHLORIDE 1 MG/ML
0.2 INJECTION, SOLUTION INTRAMUSCULAR; INTRAVENOUS; SUBCUTANEOUS EVERY 2 HOUR PRN
Status: DISCONTINUED | OUTPATIENT
Start: 2025-07-16 | End: 2025-07-17

## 2025-07-16 RX ORDER — MORPHINE SULFATE 10 MG/ML
6 INJECTION, SOLUTION INTRAMUSCULAR; INTRAVENOUS EVERY 10 MIN PRN
Status: DISCONTINUED | OUTPATIENT
Start: 2025-07-16 | End: 2025-07-16 | Stop reason: HOSPADM

## 2025-07-16 RX ORDER — SODIUM CHLORIDE 9 MG/ML
INJECTION, SOLUTION INTRAVENOUS CONTINUOUS PRN
Status: DISCONTINUED | OUTPATIENT
Start: 2025-07-16 | End: 2025-07-16 | Stop reason: SURG

## 2025-07-16 RX ORDER — ONDANSETRON 2 MG/ML
4 INJECTION INTRAMUSCULAR; INTRAVENOUS EVERY 6 HOURS PRN
Status: DISCONTINUED | OUTPATIENT
Start: 2025-07-16 | End: 2025-07-17

## 2025-07-16 RX ORDER — NALOXONE HYDROCHLORIDE 0.4 MG/ML
0.08 INJECTION, SOLUTION INTRAMUSCULAR; INTRAVENOUS; SUBCUTANEOUS AS NEEDED
Status: DISCONTINUED | OUTPATIENT
Start: 2025-07-16 | End: 2025-07-16 | Stop reason: HOSPADM

## 2025-07-16 RX ORDER — MORPHINE SULFATE 4 MG/ML
2 INJECTION, SOLUTION INTRAMUSCULAR; INTRAVENOUS EVERY 10 MIN PRN
Status: DISCONTINUED | OUTPATIENT
Start: 2025-07-16 | End: 2025-07-16 | Stop reason: HOSPADM

## 2025-07-16 RX ORDER — METOCLOPRAMIDE HYDROCHLORIDE 5 MG/ML
5 INJECTION INTRAMUSCULAR; INTRAVENOUS EVERY 8 HOURS PRN
Status: DISCONTINUED | OUTPATIENT
Start: 2025-07-16 | End: 2025-07-16 | Stop reason: HOSPADM

## 2025-07-16 RX ORDER — HYDROMORPHONE HYDROCHLORIDE 1 MG/ML
0.4 INJECTION, SOLUTION INTRAMUSCULAR; INTRAVENOUS; SUBCUTANEOUS EVERY 5 MIN PRN
Status: DISCONTINUED | OUTPATIENT
Start: 2025-07-16 | End: 2025-07-16 | Stop reason: HOSPADM

## 2025-07-16 RX ORDER — HYDROMORPHONE HYDROCHLORIDE 1 MG/ML
0.6 INJECTION, SOLUTION INTRAMUSCULAR; INTRAVENOUS; SUBCUTANEOUS EVERY 5 MIN PRN
Status: DISCONTINUED | OUTPATIENT
Start: 2025-07-16 | End: 2025-07-16 | Stop reason: HOSPADM

## 2025-07-16 RX ORDER — HYDROCODONE BITARTRATE AND ACETAMINOPHEN 5; 325 MG/1; MG/1
2 TABLET ORAL EVERY 4 HOURS PRN
Status: DISCONTINUED | OUTPATIENT
Start: 2025-07-16 | End: 2025-07-17

## 2025-07-16 RX ORDER — HYDROMORPHONE HYDROCHLORIDE 1 MG/ML
0.2 INJECTION, SOLUTION INTRAMUSCULAR; INTRAVENOUS; SUBCUTANEOUS EVERY 5 MIN PRN
Status: DISCONTINUED | OUTPATIENT
Start: 2025-07-16 | End: 2025-07-16 | Stop reason: HOSPADM

## 2025-07-16 RX ORDER — EPHEDRINE SULFATE 50 MG/ML
INJECTION, SOLUTION INTRAVENOUS AS NEEDED
Status: DISCONTINUED | OUTPATIENT
Start: 2025-07-16 | End: 2025-07-16 | Stop reason: SURG

## 2025-07-16 RX ORDER — ENOXAPARIN SODIUM 100 MG/ML
40 INJECTION SUBCUTANEOUS DAILY
Status: DISCONTINUED | OUTPATIENT
Start: 2025-07-17 | End: 2025-07-16

## 2025-07-16 RX ORDER — ENOXAPARIN SODIUM 100 MG/ML
40 INJECTION SUBCUTANEOUS DAILY
Status: DISCONTINUED | OUTPATIENT
Start: 2025-07-17 | End: 2025-07-17

## 2025-07-16 RX ORDER — MIDAZOLAM HYDROCHLORIDE 1 MG/ML
INJECTION INTRAMUSCULAR; INTRAVENOUS AS NEEDED
Status: DISCONTINUED | OUTPATIENT
Start: 2025-07-16 | End: 2025-07-16 | Stop reason: SURG

## 2025-07-16 RX ORDER — SODIUM CHLORIDE, SODIUM LACTATE, POTASSIUM CHLORIDE, CALCIUM CHLORIDE 600; 310; 30; 20 MG/100ML; MG/100ML; MG/100ML; MG/100ML
INJECTION, SOLUTION INTRAVENOUS CONTINUOUS
Status: DISCONTINUED | OUTPATIENT
Start: 2025-07-16 | End: 2025-07-16 | Stop reason: HOSPADM

## 2025-07-16 RX ORDER — ONDANSETRON 2 MG/ML
4 INJECTION INTRAMUSCULAR; INTRAVENOUS EVERY 6 HOURS PRN
Status: DISCONTINUED | OUTPATIENT
Start: 2025-07-16 | End: 2025-07-16 | Stop reason: HOSPADM

## 2025-07-16 RX ORDER — PHENYLEPHRINE HCL 10 MG/ML
VIAL (ML) INJECTION AS NEEDED
Status: DISCONTINUED | OUTPATIENT
Start: 2025-07-16 | End: 2025-07-16 | Stop reason: SURG

## 2025-07-16 RX ORDER — SODIUM CHLORIDE, SODIUM LACTATE, POTASSIUM CHLORIDE, CALCIUM CHLORIDE 600; 310; 30; 20 MG/100ML; MG/100ML; MG/100ML; MG/100ML
INJECTION, SOLUTION INTRAVENOUS CONTINUOUS PRN
Status: DISCONTINUED | OUTPATIENT
Start: 2025-07-16 | End: 2025-07-16 | Stop reason: SURG

## 2025-07-16 RX ORDER — LIDOCAINE HYDROCHLORIDE 10 MG/ML
INJECTION, SOLUTION EPIDURAL; INFILTRATION; INTRACAUDAL; PERINEURAL AS NEEDED
Status: DISCONTINUED | OUTPATIENT
Start: 2025-07-16 | End: 2025-07-16 | Stop reason: SURG

## 2025-07-16 RX ORDER — ACETAMINOPHEN 325 MG/1
650 TABLET ORAL EVERY 4 HOURS PRN
Status: DISCONTINUED | OUTPATIENT
Start: 2025-07-16 | End: 2025-07-17

## 2025-07-16 RX ORDER — ROCURONIUM BROMIDE 10 MG/ML
INJECTION, SOLUTION INTRAVENOUS AS NEEDED
Status: DISCONTINUED | OUTPATIENT
Start: 2025-07-16 | End: 2025-07-16 | Stop reason: SURG

## 2025-07-16 RX ORDER — SODIUM CHLORIDE 9 MG/ML
INJECTION, SOLUTION INTRAVENOUS CONTINUOUS
Status: DISCONTINUED | OUTPATIENT
Start: 2025-07-16 | End: 2025-07-17

## 2025-07-16 RX ORDER — ONDANSETRON 2 MG/ML
INJECTION INTRAMUSCULAR; INTRAVENOUS AS NEEDED
Status: DISCONTINUED | OUTPATIENT
Start: 2025-07-16 | End: 2025-07-16 | Stop reason: SURG

## 2025-07-16 RX ORDER — MORPHINE SULFATE 4 MG/ML
4 INJECTION, SOLUTION INTRAMUSCULAR; INTRAVENOUS EVERY 10 MIN PRN
Status: DISCONTINUED | OUTPATIENT
Start: 2025-07-16 | End: 2025-07-16 | Stop reason: HOSPADM

## 2025-07-16 RX ORDER — BUPIVACAINE HYDROCHLORIDE 2.5 MG/ML
INJECTION, SOLUTION EPIDURAL; INFILTRATION; INTRACAUDAL; PERINEURAL AS NEEDED
Status: DISCONTINUED | OUTPATIENT
Start: 2025-07-16 | End: 2025-07-16 | Stop reason: HOSPADM

## 2025-07-16 RX ORDER — HEPARIN SODIUM 1000 [USP'U]/ML
INJECTION, SOLUTION INTRAVENOUS; SUBCUTANEOUS AS NEEDED
Status: DISCONTINUED | OUTPATIENT
Start: 2025-07-16 | End: 2025-07-16 | Stop reason: SURG

## 2025-07-16 RX ADMIN — PHENYLEPHRINE HCL 100 MCG: 10 MG/ML VIAL (ML) INJECTION at 14:01:00

## 2025-07-16 RX ADMIN — PHENYLEPHRINE HCL 100 MCG: 10 MG/ML VIAL (ML) INJECTION at 14:50:00

## 2025-07-16 RX ADMIN — EPHEDRINE SULFATE 5 MG: 50 INJECTION, SOLUTION INTRAVENOUS at 14:01:00

## 2025-07-16 RX ADMIN — SODIUM CHLORIDE: 9 INJECTION, SOLUTION INTRAVENOUS at 13:47:00

## 2025-07-16 RX ADMIN — PHENYLEPHRINE HCL 100 MCG: 10 MG/ML VIAL (ML) INJECTION at 14:33:00

## 2025-07-16 RX ADMIN — EPHEDRINE SULFATE 5 MG: 50 INJECTION, SOLUTION INTRAVENOUS at 14:11:00

## 2025-07-16 RX ADMIN — ROCURONIUM BROMIDE 50 MG: 10 INJECTION, SOLUTION INTRAVENOUS at 13:57:00

## 2025-07-16 RX ADMIN — PHENYLEPHRINE HCL 100 MCG: 10 MG/ML VIAL (ML) INJECTION at 14:18:00

## 2025-07-16 RX ADMIN — LIDOCAINE HYDROCHLORIDE 50 MG: 10 INJECTION, SOLUTION EPIDURAL; INFILTRATION; INTRACAUDAL; PERINEURAL at 13:57:00

## 2025-07-16 RX ADMIN — PHENYLEPHRINE HCL 100 MCG: 10 MG/ML VIAL (ML) INJECTION at 14:39:00

## 2025-07-16 RX ADMIN — PHENYLEPHRINE HCL 100 MCG: 10 MG/ML VIAL (ML) INJECTION at 14:44:00

## 2025-07-16 RX ADMIN — ONDANSETRON 4 MG: 2 INJECTION INTRAMUSCULAR; INTRAVENOUS at 14:54:00

## 2025-07-16 RX ADMIN — DEXAMETHASONE SODIUM PHOSPHATE 4 MG: 4 MG/ML VIAL (ML) INJECTION at 13:57:00

## 2025-07-16 RX ADMIN — MIDAZOLAM HYDROCHLORIDE 2 MG: 1 INJECTION INTRAMUSCULAR; INTRAVENOUS at 13:51:00

## 2025-07-16 RX ADMIN — SODIUM CHLORIDE, SODIUM LACTATE, POTASSIUM CHLORIDE, CALCIUM CHLORIDE: 600; 310; 30; 20 INJECTION, SOLUTION INTRAVENOUS at 13:48:00

## 2025-07-16 RX ADMIN — HEPARIN SODIUM 7000 UNITS: 1000 INJECTION, SOLUTION INTRAVENOUS; SUBCUTANEOUS at 14:30:00

## 2025-07-16 RX ADMIN — PHENYLEPHRINE HCL 100 MCG: 10 MG/ML VIAL (ML) INJECTION at 14:11:00

## 2025-07-16 RX ADMIN — PROTAMINE SULFATE 50 MG: 10 INJECTION, SOLUTION INTRAVENOUS at 15:16:00

## 2025-07-16 NOTE — PROGRESS NOTES
[facility]  part of [facility]    Progress Note    Geovanny Prabhakar Patient Status:  Inpatient    1950 MRN [id number]   Location [facility] [place] POST ANESTHESIA CARE UNIT Attending Florencia Dennis MD   Hosp Day # 1 PCP Aaron Helton MD       Subjective:   Geovanny Prabhakar is a(n) 74 year old male who was seen and examined in the PACU. No complaints of pain. Feeling ok. No nausea or vomiting.     Objective:   Blood pressure 134/50, pulse 58, temperature 98.2 °F (36.8 °C), temperature source Oral, resp. rate 18, height 5' 7\" (1.702 m), weight 180 lb (81.6 kg), SpO2 99%.    Physical Exam:    General: No acute distress.   Respiratory: Clear to auscultation bilaterally. No wheezes. No rhonchi.  Cardiovascular: S1, S2. Regular rate and rhythm. No murmurs, rubs or gallops.   Abdomen: Soft, nontender, nondistended.  Positive bowel sounds. No rebound or guarding.  Neurologic: No focal neurological deficits.   Musculoskeletal: Moves all extremities.  Extremities: No edema.    Results:     Lab Results   Component Value Date    WBC 6.3 2025    HGB 12.2 (L) 2025    HCT 35.7 (L) 2025    .0 2025    CREATSERUM 1.56 (H) 2025    BUN 27 (H) 2025     2025    K 4.2 2025     2025    CO2 22.0 2025     (H) 2025    CA 9.0 2025    ALB 4.8 2025    ALKPHO 81 2025    BILT 0.6 2025    TP 7.3 2025    AST 16 2025    ALT 11 2025    PTT 31.4 2025    INR 1.00 07/15/2025    T4F 1.1 05/15/2019    TSH 1.429 2025    PSA 1.1 2016    B12 469 2025       US ARTERIAL DUPLEX LOWER EXTREMITY RIGHT (CPT=93926)  Result Date: 2025  IMPRESSION: 1. Occlusion of the stented superficial femoral artery  just distal to the origin of the common femoral artery. Reconstitution of the popliteal artery with dampened three-vessel runoff. Electronically Verified and Signed by Attending  Radiologist: Homer Thomas MD 7/16/2025 9:24 AM Workstation: [id number]    CTA ABDOMEN/PELVIS LOWER EXT BILAT W RUNOFF (CPT=75635)  Result Date: 7/15/2025  CONCLUSION: 1.  Right Lower Extremity: Occlusion of the right superficial femoral artery just beyond the takeoff; overlapping stents throughout the right superficial femoral artery completely occluded; occlusion of the distal superficial femoral artery is seen beyond the stents with distal reconstitution at the femoral-popliteal junction. There is high-grade stenosis of the distal popliteal artery and involving the tibioperoneal trunk. Multifocal high-grade narrowing is seen throughout the runoff arteries, but  there is contiguous patency to the level of the ankle. 2.  Left Lower Extremity: Atherosclerosis of the common femoral artery with focal contained dissection of the distal common femoral artery causing mild luminal narrowing; mild-moderate luminal stenosis throughout the superficial femoral artery; three-vessel runoff. 3.  Cholelithiasis without CT evidence of a healed location. 4.  Postoperative change of subtotal colectomy. 5.  Status post appendectomy. 6.  Prostatomegaly with probable chronic outlet stricture. 7.  Lesser incidental findings as above. Electronically Verified and Signed by Attending Radiologist: Seb Porter MD 7/15/2025 7:30 PM Workstation: [id number]    EKG  Result Date: 7/16/2025  Ventricular-paced rhythm with occasional Premature ventricular complexes Abnormal ECG No previous ECGs found in [facility]    Scheduled Medications[1]  PRN Medications[2]      Assessment and Plan:     Femoral artery occlusion, right    Femoral artery thrombosis, right (HCC)    History of PAD    - Vascular surgery on consult  - pain control   - status post focal endarterectomy of the right common femoral artery and the proximal superficial femoral artery with a thrombectomy of the superficial femoral artery POD 0  - labs daily      CKD stage 3  - BMP  daily    Mild hyperkalemia  - monitor bmp daily    DM2  - SSI low dose  -accuchecks AC and HS    CAD  - resume home meds    HTN  - stable  - continue home meds    DVT proph- heparin    Full code    MDMLopez SANDERS MD  07/16/25         [1]    [Transfer Hold] aspirin  81 mg Oral Nightly    [Held by provider] lisinopril  20 mg Oral BID    [Transfer Hold] metoprolol succinate ER  100 mg Oral BID    [Transfer Hold] furosemide  60 mg Oral Daily    [Transfer Hold] insulin aspart  1-5 Units Subcutaneous TID CC   [2]   lactated ringers    atropine    naloxone    fentaNYL    fentaNYL    HYDROmorphone    HYDROmorphone    HYDROmorphone    morphINE    morphINE    morphINE PF    ondansetron    metoclopramide    [Transfer Hold] acetaminophen    [Transfer Hold] ondansetron    [Transfer Hold] metoclopramide    [Transfer Hold] temazepam    [Transfer Hold] morphINE **OR** [Transfer Hold] morphINE **OR** [Transfer Hold] morphINE    [Transfer Hold] ALPRAZolam    [Transfer Hold] glucose **OR** [Transfer Hold] glucose **OR** [Transfer Hold] glucose-vitamin C **OR** [Transfer Hold] dextrose **OR** [Transfer Hold] glucose **OR** [Transfer Hold] glucose **OR** [Transfer Hold] glucose-vitamin C    [Transfer Hold] hydrALAzine

## 2025-07-16 NOTE — H&P
[facility]    PATIENT'S NAME: ADA REDD   ATTENDING PHYSICIAN: Pillo Pedraza MD   PATIENT ACCOUNT#:   [id number]    LOCATION:  [place] [address]  MEDICAL RECORD #:   [id number]       YOB: 1950  ADMISSION DATE:       07/15/2025    HISTORY AND PHYSICAL EXAMINATION    CHIEF COMPLAINT:  Right femoral artery thrombosis.    HISTORY OF PRESENT ILLNESS:  Patient is a 74-year-old  male who had left superficial femoral artery angioplasty and stent on July 7, accessed from right femoral artery.  He was doing well up until a few days ago when he started having discomfort in his right groin area and throbbing pain in his right foot.  He was instructed by Interventional Radiology to come to the hospital for ultrasound arterial duplex which showed thrombosis of the right common and superficial femoral artery, sent to the emergency department for evaluation.  CBC unremarkable.  Chemistry showed potassium 5.2, GFR 39.  Started on IV heparin.  CT angiogram of the abdomen and pelvis with bilateral lower extremity runoff still pending.  He will be admitted to the hospital for further management.    PAST MEDICAL HISTORY:  Peripheral arterial disease with recent left superficial femoral artery angioplasty and stent.  He had prior history of right superficial femoral artery stent, anterior and posterior tibial angioplasties in December 2024.  He had right superficial femoral artery thrombectomy and superficial femoral artery and right popliteal artery stents, diabetes mellitus type 2, hypertension, hyperlipidemia, chronic kidney disease stage 3, coronary artery disease, sick sinus syndrome status post permanent pacemaker.    PAST SURGICAL HISTORY:  Coronary artery bypass graft surgery with maze procedure, appendectomy, right hemicolectomy for colon cancer.    MEDICATIONS:  Please see medication reconciliation list.  Currently on dual antiplatelet therapy with aspirin and Plavix.    ALLERGIES:   No known drug allergies.    FAMILY HISTORY:  Positive for diabetes mellitus type 2 and hypertension.    SOCIAL HISTORY:  Ex-tobacco user.  No current tobacco, alcohol, or drug use.  Lives with his family.  At baseline, independent in his basic activities of daily living.    REVIEW OF SYSTEMS:  Patient said he started having right groin discomfort a few days ago and then throbbing pain in his right foot with cold sensation started 2 days ago.  No recent trauma.  Other 12-point review of systems negative.      PHYSICAL EXAMINATION:    GENERAL:  Alert and oriented to time, place, and person.  Moderate distress.   VITAL SIGNS:  Temperature 97.4, pulse 53, respiratory rate 17, blood pressure 140/57, pulse ox 97% on room air.    HEENT:  Atraumatic.  Oropharynx clear.  Dry mucous membranes.  Ears, nose normal.  Eyes:  Anicteric sclerae.    NECK:  Supple.  No lymphadenopathy.  Trachea midline.   LUNGS:  Clear to auscultation bilaterally.  Normal respiratory effort.   HEART:  Regular rate and rhythm.  S1 and S2 auscultated.  No murmur.    ABDOMEN:  Soft, nondistended.  No tenderness.  Positive bowel sounds.  EXTREMITIES:  Dorsalis pedis and popliteal could not be palpated on the right side.  Right forefoot cold and cold to touch.  Left lower extremity +1 edema but warm to touch all the way to the foot.     NEUROLOGIC:  Motor and sensory intact.    ASSESSMENT:    1.   Long segment right femoral artery thrombosis with ischemic pain, right lower extremity.  2.   Chronic kidney disease stage 3.  3.   Mild hyperkalemia.  4.   Diabetes mellitus type 2.  5.   Coronary artery disease.  6.   Essential hypertension.    PLAN:  Patient will be admitted to telemetry floor.  Continue IV heparin.  Follow up on CT abdomen and pelvis with bilateral lower extremity runoff procedure.  Vascular surgery consult, Dr. Najjar, was notified.  Pain control.  Monitor Accu-Cheks.  N.p.o. after midnight.  Hold Plavix while on IV heparin.  Further  recommendations to follow.    Dictated By Beryl Cabral MD  d: 07/15/2025 19:26:48  t: 07/15/2025 19:42:26  [facility] [id number]  FB/

## 2025-07-16 NOTE — ANESTHESIA PROCEDURE NOTES
Arterial Line    Date/Time: 7/16/2025 1:56 PM    Performed by: Elicia Lewis MD  Authorized by: Elicia Lewis MD    General Information and Staff    Procedure Start:  7/16/2025 1:56 PM  Procedure End:  7/16/2025 1:58 PM  Anesthesiologist:  Rashawn Weathers MD  Performed By:  Anesthesiologist  Patient Location:  OR  Indication: continuous blood pressure monitoring and blood sampling needed    Site Identification: surface landmarks    Preanesthetic Checklist: 2 patient identifiers, IV checked, risks and benefits discussed, monitors and equipment checked, pre-op evaluation, timeout performed, anesthesia consent and sterile technique used    Procedure Details    Catheter Size:  20 G  Catheter Length:  1 and 3/4 inch  Catheter Type:  Arrow  Seldinger Technique?: Yes    Laterality:  Right  Site:  Radial artery  Site Prep: chlorhexidine    Line Secured:  Wrist Brace, tape and Tegaderm    Assessment    Events: patient tolerated procedure well with no complications      Medications  7/16/2025 1:56 PM      Additional Comments

## 2025-07-16 NOTE — PLAN OF CARE
Pt is alert & or x4, following cammands, post op fempop report from hilario verified pulse on right foot per doppler. Sinus r. Pt does have a perm pacer in place report from jimena from floor stated he had some v.paced beats earlier. Iv fluids at 100. Right art line in place. Spann catheter present. Right groin site. Clean,dry, intact. Bruising present. Verified site with hilario pacu rn. Accucheck 141 .  Delbert easton stated heparin drip was off at 7 am. 2 liters n/c. Hob 10 degrees wife pedro at bedside. Prn pain meds. Morphine 4 mg ivpush given. And zofran. For 7/10, pain level was still at 6 /10 pt stated he would like something more for pain 2 norco po given. Wife is helping to feed pt he is hungry. Reviewed call light usage. Much emotional support. Verified wifes phone number in [employer]. Pt stated he is full code.  Problem: Patient Centered Care  Goal: Patient preferences are identified and integrated in the patient's plan of care  Description: Interventions:  - What would you like us to know as we care for you?   - Provide timely, complete, and accurate information to patient/family  - Incorporate patient and family knowledge, values, beliefs, and cultural backgrounds into the planning and delivery of care  - Encourage patient/family to participate in care and decision-making at the level they choose  - Honor patient and family perspectives and choices  Outcome: Progressing     Problem: Patient/Family Goals  Goal: Patient/Family Long Term Goal  Description: Patient's Long Term Goal:     Interventions:  -   - See additional Care Plan goals for specific interventions  Outcome: Progressing  Goal: Patient/Family Short Term Goal  Description: Patient's Short Term Goal:     Interventions:   -   - See additional Care Plan goals for specific interventions  Outcome: Progressing     Problem: PAIN - ADULT  Goal: Verbalizes/displays adequate comfort level or patient's stated pain goal  Description: INTERVENTIONS:  - Encourage pt  to monitor pain and request assistance  - Assess pain using appropriate pain scale  - Administer analgesics based on type and severity of pain and evaluate response  - Implement non-pharmacological measures as appropriate and evaluate response  - Consider cultural and social influences on pain and pain management  - Manage/alleviate anxiety  - Utilize distraction and/or relaxation techniques  - Monitor for opioid side effects  - Notify MD/LIP if interventions unsuccessful or patient reports new pain  - Anticipate increased pain with activity and pre-medicate as appropriate  Outcome: Progressing     Problem: RISK FOR INFECTION - ADULT  Goal: Absence of fever/infection during anticipated neutropenic period  Description: INTERVENTIONS  - Monitor WBC  - Administer growth factors as ordered  - Implement neutropenic guidelines  Outcome: Progressing     Problem: SAFETY ADULT - FALL  Goal: Free from fall injury  Description: INTERVENTIONS:  - Assess pt frequently for physical needs  - Identify cognitive and physical deficits and behaviors that affect risk of falls.  - [place] fall precautions as indicated by assessment.  - Educate pt/family on patient safety including physical limitations  - Instruct pt to call for assistance with activity based on assessment  - Modify environment to reduce risk of injury  - Provide assistive devices as appropriate  - Consider OT/PT consult to assist with strengthening/mobility  - Encourage toileting schedule  Outcome: Progressing     Problem: DISCHARGE PLANNING  Goal: Discharge to home or other facility with appropriate resources  Description: INTERVENTIONS:  - Identify barriers to discharge w/pt and caregiver  - Include patient/family/discharge partner in discharge planning  - Arrange for needed discharge resources and transportation as appropriate  - Identify discharge learning needs (meds, wound care, etc)  - Arrange for interpreters to assist at discharge as needed  - Consider  post-discharge preferences of patient/family/discharge partner  - Complete POLST form as appropriate  - Assess patient's ability to be responsible for managing their own health  - Refer to Case Management Department for coordinating discharge planning if the patient needs post-hospital services based on physician/LIP order or complex needs related to functional status, cognitive ability or social support system  Outcome: Progressing     Problem: CARDIOVASCULAR - ADULT  Goal: Maintains optimal cardiac output and hemodynamic stability  Description: INTERVENTIONS:  - Monitor vital signs, rhythm, and trends  - Monitor for bleeding, hypotension and signs of decreased cardiac output  - Evaluate effectiveness of vasoactive medications to optimize hemodynamic stability  - Monitor arterial and/or venous puncture sites for bleeding and/or hematoma  - Assess quality of pulses, skin color and temperature  - Assess for signs of decreased coronary artery perfusion - ex. Angina  - Evaluate fluid balance, assess for edema, trend weights  Outcome: Progressing  Goal: Absence of cardiac arrhythmias or at baseline  Description: INTERVENTIONS:  - Continuous cardiac monitoring, monitor vital signs, obtain 12 lead EKG if indicated  - Evaluate effectiveness of antiarrhythmic and heart rate control medications as ordered  - Initiate emergency measures for life threatening arrhythmias  - Monitor electrolytes and administer replacement therapy as ordered  Outcome: Progressing     Problem: METABOLIC/FLUID AND ELECTROLYTES - ADULT  Goal: Glucose maintained within prescribed range  Description: INTERVENTIONS:  - Monitor Blood Glucose as ordered  - Assess for signs and symptoms of hyperglycemia and hypoglycemia  - Administer ordered medications to maintain glucose within target range  - Assess barriers to adequate nutritional intake and initiate nutrition consult as needed  - Instruct patient on self management of diabetes  Outcome:  Progressing  Goal: Electrolytes maintained within normal limits  Description: INTERVENTIONS:  - Monitor labs and rhythm and assess patient for signs and symptoms of electrolyte imbalances  - Administer electrolyte replacement as ordered  - Monitor response to electrolyte replacements, including rhythm and repeat lab results as appropriate  - Fluid restriction as ordered  - Instruct patient on fluid and nutrition restrictions as appropriate  Outcome: Progressing  Goal: Hemodynamic stability and optimal renal function maintained  Description: INTERVENTIONS:  - Monitor labs and assess for signs and symptoms of volume excess or deficit  - Monitor intake, output and patient weight  - Monitor urine specific gravity, serum osmolarity and serum sodium as indicated or ordered  - Monitor response to interventions for patient's volume status, including labs, urine output, blood pressure (other measures as available)  - Encourage oral intake as appropriate  - Instruct patient on fluid and nutrition restrictions as appropriate  Outcome: Progressing

## 2025-07-16 NOTE — ANESTHESIA PROCEDURE NOTES
Airway  Date/Time: 7/16/2025 1:57 PM  Reason: Elective      General Information and Staff   Patient location during procedure: OR  Anesthesiologist: Elicia Lewis MD  Performed: anesthesiologist   Performed by: Elicia Lewis MD  Authorized by: Elicia Lewis MD        Indications and Patient Condition  Indications for airway management: anesthesia  Sedation level: deep      Preoxygenated: yesPatient position: sniffing    Mask difficulty assessment: 1 - vent by mask    Final Airway Details    Final airway type: endotracheal airway    Successful airway: ETT  Cuffed: yes   Successful intubation technique: Video laryngoscopy  Facilitating devices/methods: intubating stylet  Endotracheal tube insertion site: oral  Blade: GlideScope  Blade size: #4  ETT size (mm): 7.5    Cormack-Lehane Classification: grade I - full view of glottis  Placement verified by: capnometry   Measured from: lips  ETT to lips (cm): 23  Number of attempts at approach: 1  Ventilation between attempts: none  Number of other approaches attempted: 0    Additional Comments  Atraumatic, dentition intact.  Oral gastric suction.

## 2025-07-16 NOTE — ED QUICK NOTES
Patient on cardiac,bp,spo2 monitoring. Wife at bedside.  Plan of Care reviewed. Waiting for upstairs nurse assignment.  Elimination needs assessed.  Provided warm blanket.    Bed is locked and in lowest position. Call light within reach.

## 2025-07-16 NOTE — ANESTHESIA PREPROCEDURE EVALUATION
Anesthesia PreOp Note    HPI:     Geovanny Prabhakar is a 74 year old male who presents for preoperative consultation requested by: Najjar, Samer F, MD    Date of Surgery: 7/15/2025 - 7/16/2025    Procedure(s):  FEMORAL ENDARTERECTOMY BYPASS  Indication: Femoral artery occlusion, right    Relevant Problems   No relevant active problems       NPO:  Last Liquid Consumption Date: 07/15/25  Last Liquid Consumption Time: 2300  Last Solid Consumption Date: 07/15/25  Last Solid Consumption Time: 1900  Last Liquid Consumption Date: 07/15/25          History Review:  Patient Active Problem List    Diagnosis Date Noted    Femoral artery occlusion, 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    Femoral artery thrombosis, 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    History of colon cancer 04/28/2025    Acute pain of right foot 12/19/2024    Acute lower limb ischemia 12/19/2024    Encounter for screening for malignant neoplasm of prostate  09/29/2021    CAD (coronary artery disease)     DM2 (diabetes mellitus, type 2) ([facility])     HTN (hypertension)     Acute anemia 06/14/2019    Type 2 diabetes mellitus without complication (HCC) 05/25/2016    Pure hypercholesterolemia 05/25/2016    Coronary artery disease involving native coronary artery of native heart without angina pectoris 05/25/2016    Paroxysmal atrial fibrillation (HCC) 05/25/2016    Essential hypertension with goal blood pressure less than 140/90 05/25/2016       Past Medical History[1]    Past Surgical History[2]    Prescriptions Prior to Admission[3]  Current Medications and Prescriptions Ordered in Epic[4]    Allergies[5]    Family History[6]  Social Hx on file[7]    Available pre-op labs reviewed.  Lab Results   Component Value Date    WBC 6.3 07/16/2025    RBC 4.26 07/16/2025    HGB 12.2 (L) 07/16/2025    HCT 35.7 (L) 07/16/2025    MCV 83.8 07/16/2025    MCH 28.6 07/16/2025    MCHC 34.2 07/16/2025    RDW 14.4 07/16/2025    .0 07/16/2025     Lab Results   Component Value Date     07/16/2025    K 4.2  07/16/2025     07/16/2025    CO2 22.0 07/16/2025    BUN 27 (H) 07/16/2025    CREATSERUM 1.56 (H) 07/16/2025     (H) 07/16/2025    PGLU 147 (H) 07/16/2025    CA 9.0 07/16/2025     Lab Results   Component Value Date    INR 1.00 07/15/2025       Vital Signs:  Body mass index is 28.19 kg/m².   height is 1.702 m (5' 7\") and weight is 81.6 kg (180 lb). His oral temperature is 98.2 °F (36.8 °C). His blood pressure is 134/50 and his pulse is 58. His respiration is 18 and oxygen saturation is 99%.   Vitals:    07/16/25 0602 07/16/25 0702 07/16/25 0759 07/16/25 1307   BP:  144/56 131/55 134/50   Pulse:  59  58   Resp:  20 20 18   Temp:   98.3 °F (36.8 °C) 98.2 °F (36.8 °C)   TempSrc:   Oral Oral   SpO2:  95% 93% 99%   Weight: 81.6 kg (180 lb)      Height:            Anesthesia Evaluation      Airway   Mallampati: II  TM distance: >3 FB  Neck ROM: full  Dental      Comment: Caps front two upper incisors, missing teeth    Pulmonary - normal exam   Cardiovascular - normal exam  (+) pacemaker, hypertension, CAD    Neuro/Psych      GI/Hepatic/Renal    (+) chronic renal disease CRI    Endo/Other    (+) diabetes mellitus (anemia)  Abdominal            EKG Narrative  Performed by: MUSE  Ventricular-paced rhythm with occasional Premature ventricular complexes  Abnormal ECG  No previous ECGs found in Muse   Specimen Collected: 07/15/25 18:22         Anesthesia Plan:   ASA:  3  Plan:   General  Monitors and Lines:   BIS  Airway:  ETT  Informed Consent Plan and Risks Discussed With:  Patient and spouse      I have informed Geovanny SEYMOUR Talgwynreena and/or legal guardian or family member of the nature of the anesthetic plan, benefits, risks including possible dental damage if relevant, major complications, and any alternative forms of anesthetic management.   All of the patient's questions were answered to the best of my ability. The patient desires the anesthetic management as planned.  Elicia Lewis MD  7/16/2025 1:23 PM  Present on  Admission:  **None**           [1]   Past Medical History:   Atherosclerosis of coronary artery    CAD (coronary artery disease)    Cancer (HCC)    Colon cancer (HCC)    COVID-19 virus infection    Oct 2020    Diabetes (HCC)    DM2 (diabetes mellitus, type 2) (HCC)    Essential hypertension    HTN (hypertension)    Hyperlipidemia    Other abnormal glucose    rubber band hemorroid    Paroxysmal atrial fibrillation (HCC)   [2]   Past Surgical History:  Procedure Laterality Date    Cardiac pacemaker placement      Other      MAZE    Other      CABAG    Other      appendectomy    Other      pacer    Other       Status post hemicolectomy, July 2019   [3]   Medications Prior to Admission   Medication Sig Dispense Refill Last Dose/Taking    acetaminophen 500 MG Oral Tab Take 2 tablets (1,000 mg total) by mouth 3 (three) times daily as needed for Pain.   7/15/2025 at 11:00 AM    glipiZIDE ER (GLIPIZIDE XL) 10 MG Oral Tablet 24 Hr Take 1 tablet (10 mg total) by mouth in the morning and 1 tablet (10 mg total) before bedtime. 180 tablet 3 7/15/2025 at  8:30 AM    metoprolol succinate  MG Oral Tablet 24 Hr Take 1 tablet (100 mg total) by mouth 2 (two) times daily. 180 tablet 3 7/15/2025 at  8:30 AM    lisinopril 20 MG Oral Tab TAKE 1 TABLET(20 MG) BY MOUTH TWICE DAILY 180 tablet 1 7/15/2025 at  8:30 AM    aspirin 81 MG Oral Tab EC Take 1 tablet (81 mg total) by mouth daily. (Patient taking differently: Take 1 tablet (81 mg total) by mouth at bedtime.) 90 tablet 3 7/14/2025 at  9:00 PM    Capsaicin 0.1 % External Cream Apply 1 Application topically 2 (two) times daily as needed. 60 g 0 Taking As Needed    ALPRAZOLAM 0.5 MG Oral Tab TAKE 1 TABLET(0.5 MG) BY MOUTH TWICE DAILY AS NEEDED FOR ANXIETY OR SLEEP 60 tablet 5 Taking As Needed    furosemide 40 MG Oral Tab Take 1 tablet (40 mg total) by mouth 2 (two) times daily as needed (Swelling in the feet). (Patient taking differently: Take 1.5 tablets (60 mg total) by mouth  daily.) 180 tablet 3 7/15/2025 at  8:30 AM    clopidogrel 75 MG Oral Tab Take 1 tablet (75 mg total) by mouth daily. (Patient taking differently: Take 1 tablet (75 mg total) by mouth at bedtime.) 90 tablet 3 7/14/2025 at  9:00 PM   [4]   Current Facility-Administered Medications Ordered in Epic   Medication Dose Route Frequency Provider Last Rate Last Admin    sodium chloride 0.9% infusion   Intravenous Continuous Beryl Cabral  mL/hr at 07/16/25 0417 New Bag at 07/16/25 0417    [Transfer Hold] acetaminophen (Tylenol Extra Strength) tab 500 mg  500 mg Oral Q4H PRN Beryl Cabral MD        [Transfer Hold] ondansetron (Zofran) 4 MG/2ML injection 4 mg  4 mg Intravenous Q6H PRN Beryl Cabral MD        [Transfer Hold] metoclopramide (Reglan) 5 mg/mL injection 5 mg  5 mg Intravenous Q8H PRN Beryl Cabral MD        [Transfer Hold] temazepam (Restoril) cap 15 mg  15 mg Oral Nightly PRN Beryl Cabral MD        [Transfer Hold] morphINE PF 2 MG/ML injection 1 mg  1 mg Intravenous Q2H PRN Beryl Cabral MD        Or    [Transfer Hold] morphINE PF 2 MG/ML injection 2 mg  2 mg Intravenous Q2H PRN Beryl Cabral MD        Or    [Transfer Hold] morphINE PF 4 MG/ML injection 4 mg  4 mg Intravenous Q2H PRN Beryl Cabral MD   4 mg at 07/16/25 0705    [Transfer Hold] aspirin DR tab 81 mg  81 mg Oral Nightly Beryl Cabral MD   81 mg at 07/15/25 2101    [Held by provider] lisinopril (Prinivil; Zestril) tab 20 mg  20 mg Oral BID Beryl Cabral MD        [Transfer Hold] metoprolol succinate ER (Toprol XL) 24 hr tab 100 mg  100 mg Oral BID Beryl Cabral MD   100 mg at 07/16/25 0803    [Transfer Hold] ALPRAZolam (Xanax) tab 0.5 mg  0.5 mg Oral BID PRN Beryl Cabral MD        [Transfer Hold] furosemide (Lasix) tab 60 mg  60 mg Oral Daily Beryl Cabral MD   60 mg at 07/16/25 0803    [Transfer Hold] glucose (Dex4) 15 GM/59ML oral liquid 15 g  15 g Oral Q15 Min PRN Beryl Cabral MD   15 g at 07/15/25  2049    Or    [Transfer Hold] glucose (Glutose) 40% oral gel 15 g  15 g Oral Q15 Min PRN Beryl Cabral MD        Or    [Transfer Hold] glucose-vitamin C (Dex-4) chewable tab 4 tablet  4 tablet Oral Q15 Min PRN Beryl Cabral MD        Or    [Transfer Hold] dextrose 50% injection 50 mL  50 mL Intravenous Q15 Min PRN Beryl Cabral MD        Or    [Transfer Hold] glucose (Dex4) 15 GM/59ML oral liquid 30 g  30 g Oral Q15 Min PRN Beryl Cabral MD        Or    [Transfer Hold] glucose (Glutose) 40% oral gel 30 g  30 g Oral Q15 Min PRN Beryl Cabral MD        Or    [Transfer Hold] glucose-vitamin C (Dex-4) chewable tab 8 tablet  8 tablet Oral Q15 Min PRN Beryl Cabral MD        [Transfer Hold] insulin aspart (NovoLOG) 100 Units/mL FlexPen 1-5 Units  1-5 Units Subcutaneous TID CC Beryl Cabral MD        [Transfer Hold] hydrALAzine (Apresoline) 20 mg/mL injection 10 mg  10 mg Intravenous Q6H PRN Sri Xiao MD         No current [facility]-ordered outpatient medications on file.   [5] No Known Allergies  [6] History reviewed. No pertinent family history.  [7]   Social History  Socioeconomic History    Marital status:    Tobacco Use    Smoking status: Former    Smokeless tobacco: Never   Vaping Use    Vaping status: Every Day    Substances: Nicotine    Devices: Refillable tank   Substance and Sexual Activity    Alcohol use: No     Alcohol/week: 0.0 standard drinks of alcohol    Drug use: No

## 2025-07-16 NOTE — ANESTHESIA POSTPROCEDURE EVALUATION
Patient: Geovanny Prabhakar    Procedure Summary       Date: 07/16/25 Room / Location: [facility] MAIN OR  / [facility] MAIN OR    Anesthesia Start: 1347 Anesthesia Stop: 1555    Procedure: FEMORAL ENDARTERECTOMY BYPASS (Right) Diagnosis: (Femoral artery occlusion, right)    Surgeons: Najjar, Samer F, MD Anesthesiologist: Elicia Lewis MD    Anesthesia Type: general ASA Status: 3            Anesthesia Type: general    Vitals Value Taken Time   /54 07/16/25 15:54   Temp 97.9 07/16/25 15:56   Pulse 64 07/16/25 15:55   Resp 17 07/16/25 15:55   SpO2 97 % 07/16/25 15:55   Vitals shown include unfiled device data.    [facility] AN Post Evaluation:   Patient Evaluated in PACU  Patient Participation: complete - patient participated  Level of Consciousness: awake  Pain Score: 0  Pain Management: adequate  Airway Patency:patent  Yes    Nausea/Vomiting: none  Cardiovascular Status: hemodynamically stable  Respiratory Status: acceptable  Postoperative Hydration stable      Elicia Lewis MD  7/16/2025 3:56 PM

## 2025-07-16 NOTE — PROGRESS NOTES
This RN called and spoke to a [employer] rep. Per the rep,    Only need to use a magnet if there is cautery  Unsure on the battery status because the device can't be followed remotely  If the battery status has less than 1 year left: pace at 90  If the battery status has more than one year left: pace at 100  If the battery needs replacement: pace at 85

## 2025-07-16 NOTE — CONSULTS
Samer F. Najjar, MD.    Vascular Surgery           VASCULAR SURGERY   INPATIENT CONSULT NOTE      Name: Geovanny Prabhakar   :   1950  [id number]     Date of Consultation: 2025       REFERRING PHYSICIAN: Florencia Dennis MD  PRIMARY CARE PHYSICIAN:  Aaron Helton MD    HISTORY OF PRESENT ILLNESS:   Patient is a 74 year old diabetic male with history of peripheral arterial disease whom I have been asked to see regarding subacute occlusion of his right superficial femoral artery.  About a week ago, the patient underwent left femoral artery angioplasty and stent via right common femoral artery access. He was doing well up until a few days ago when he started having discomfort in his right groin area and throbbing pain in his right foot.  He was instructed by Interventional Radiology to come to the hospital for ultrasound arterial duplex which showed thrombosis of the right common and superficial femoral artery, sent to the emergency department for evaluation.  A CT angiogram of the abdomen and pelvis with lower extremity runoff revealed patency of the right common femoral artery but with occlusion of the superficial femoral artery and continued patency of the profunda.  The patient has previously undergone stenting of the right superficial femoral artery twice.  The patient did not have motor or sensory deficits but does describe having shooting pain behind his thigh down to the calf areas.  He does have a history of neuropathy.  He has previously undergone a CABG.    PAST MEDICAL HISTORY:    Past Medical History[1]    PAST SURGICAL HISTORY:   Past Surgical History[2]    MEDICATIONS:   Current Hospital Medications[3]    ALLERGIES:    He has no known allergies.    SOCIAL HISTORY:    Patient  reports that he has quit smoking. He has never used smokeless tobacco. He reports that he does not drink alcohol and does not use drugs.    FAMILY HISTORY:    Patient's family history is not on file.    ROS:     Comprehensive ROS reviewed and negative except for what's stated above.  Including negative for chest pain, shortness of breath, syncope.     EXAM:  /50 (BP Location: Right arm)   Pulse 58   Temp 98.2 °F (36.8 °C) (Oral)   Resp 18   Ht 5' 7\" (1.702 m)   Wt 180 lb (81.6 kg)   SpO2 99%   BMI 28.19 kg/m²   GENERAL: alert and orientated X 3, well developed, well nourished, in no apparent distress  NEURO/PSYCH: normal mood and affect  NECK: supple   CAROTID: No bruits  RESPIRATORY: no rales, rhonchi, or wheezes B  CARDIO: RRR without murmur, no murmur, no gallop   ABDOMEN: soft, non-tender with no palpable aneurysm or masses  EXTREMITIES: no tenderness  VASCULAR:        Femoral Popliteal DP PT Peroneal Edema   Right 1+       absent signal absent signal monophasic signal none   Left 1+       biphasic signal biphasic signal biphasic signal 1+        no sensory or motor deficits.  The right foot is cooler than the left..  The calf is soft      Diagnostic Data:      LABS:  Recent Labs   Lab 07/15/25  1812 07/16/25  0224   WBC 8.0 6.3   HGB 14.0 12.2*   MCV 84.2 83.8   .0 161.0   INR 1.00  --        Recent Labs   Lab 07/15/25  1813 07/16/25  0224    139   K 5.2* 4.2    107   CO2 25.0 22.0   BUN 31* 27*   CREATSERUM 1.80* 1.56*   GLU 91 132*   CA 9.8 9.0     Recent Labs   Lab 07/15/25  1812 07/16/25  0224 07/16/25  0945   PTP 13.8  --   --    INR 1.00  --   --    PTT 32.0 130.7* 31.4     No results for input(s): \"ALT\", \"AST\", \"ALB\", \"AMYLASE\", \"LIPASE\", \"LDH\" in the last 168 hours.    Invalid input(s): \"ALPHOS\", \"TBIL\", \"DBIL\", \"TPROT\"  No results for input(s): \"TROP\" in the last 168 hours.  No results found for: \"ANAS\", \"NIKHIL\", \"ANASCRN\"  No results for input(s): \"PCACT\", \"PSACT\", \"AT3ACT\", \"HIPAB\", \"PATHI\", \"STALA\", \"DRVVTRATIO\", \"DRVVT\", \"STACLOT\", \"CARIGG\", \"Y9RI1CDOFE\", \"X0QY3GGDEJ\", \"RA\", \"HAVIGM\", \"HBCIGM\", \"HCVAB\", \"HBSAG\", \"HBCAB\", \"HBVDNAINTERP\", \"ANAS\", \"C3\", \"C4\" in the last 168  hours.    Lab Results   Component Value Date    HGB 12.2 (L) 07/16/2025    HGB 14.0 07/15/2025    HGB 13.2 06/05/2025    HGB 13.3 02/06/2025    HGB 14.1 01/10/2025    HGB 12.7 (L) 12/20/2024    CREATSERUM 1.56 (H) 07/16/2025    CREATSERUM 1.80 (H) 07/15/2025    CREATSERUM 1.64 (H) 07/07/2025    CREATSERUM 1.81 (H) 06/05/2025    CREATSERUM 1.67 (H) 02/19/2025    CREATSERUM 1.61 (H) 02/06/2025           Radiology: US ARTERIAL DUPLEX LOWER EXTREMITY RIGHT (CPT=93926)  Result Date: 7/16/2025  US ARTERIAL DUPLEX LOWER EXTREMITY RIGHT (CPT=93926) CLINICAL INDICATION: COLD RIGHT FOOT TECHNIQUE: A Duplex Doppler study was performed of the right lower extremity consisting of integrated two dimensional, real time imaging, color flow doppler and spectral analysis probes COMPARISON: CTA of 6/27/2025 FINDINGS: RIGHT: EXTERNAL ILIAC ARTERY: Velocity:  257.1 Waveform: Monophasic COMMON FEMORAL ARTERY: Velocity: 170.2 cm/sec Waveform: Monophasic SUPERFICIAL FEMORAL ARTERY: PROXIMAL:  Superficial femoral artery is occluded beginning just proximal to the origin of the stent and throughout its course with reconstitution at the popliteal artery. PROFUNDA ARTERY: Velocity:  144.6 cm/sec Waveform:  Monophasic POPLITEAL: Velocity: 22.3 cm/sec Waveform: Monophasic POSTERIOR TIBIAL: PROXIMAL: Velocity: 8.9 cm/sec Waveform: Monophasic ANTERIOR TIBIAL: Flow detected but velocity is not recorded. PERONEAL: Velocity: 23.6 cm/sec Waveform: Monophasic DORSALIS PEDIS Velocity: 7.6 cm/sec Waveform: Monophasic Dense atherosclerotic disease is noted throughout most notably in the common femoral artery  and the proximal superficial femoral artery. Plaque is noted in this region without flow  from the proximal superficial artery throughout the SFA stent.     IMPRESSION: 1. Occlusion of the stented superficial femoral artery  just distal to the origin of the common femoral artery. Reconstitution of the popliteal artery with dampened three-vessel runoff.  Electronically Verified and Signed by Attending Radiologist: Homer Thomas MD 7/16/2025 9:24 AM Workstation: [id number]    CTA ABDOMEN/PELVIS LOWER EXT BILAT W RUNOFF (CPT=75635)  Result Date: 7/15/2025  PROCEDURE: CTA ABDOMEN/PELVIS LOWER EXT BILAT W RUNOFF (BKS=67402) INDICATIONS: 74-year-old male with right femoral artery occlusion. COMPARISON: 07/15/2025 US ARTERIAL DUPLEX LOWER EXTREMITY RIGHT (CPT=93926) 05/29/2022 CT CHEST+ABDOMEN+PELVIS(ALL [facility] ONLY)(CPT=71260/62638) 06/27/2025 CTA LOWER EXTREMITY BILATERAL (W+WO) (CPT=73706-50) TECHNIQUE: CT images of the abdomen, pelvis, and lower extremities were obtained without and with non-ionic intravenous contrast material. Multi-planar reformatted and 3-D images were created with vessel analysis performed on an independent workstation. Automated exposure control for dose reduction was used. Adjustment of the mA and/or kV was done based on the patient's size. Iterative reconstruction technique for dose reduction was employed. Dose information was transmitted to the ACR (American College  of Radiology) NRDR (National Radiology Data Registry), which includes the Dose Index Registry. CONTRAST USED: IOPAMIDOL 76% IV SOLN FOR POWER INJECTOR:90 mL. FINDINGS: ANGIOGRAPHY: CELIAC AXIS/SMA: No evidence of hemodynamically significant stenosis. RENAL ARTERIES: No evidence of stenosis or dissection. Renovascular calcifications are present. AORTA: On precontrast images, there is no crescentic mural hyperdensity. No aneurysm or dissection is seen. ANGELITA: Flow is present.  RIGHT LEG ILIAC ARTERIES: Atherosclerotic vascular calcifications are present in the common, internal, and external iliac arteries. No hemodynamically significant stenosis or occlusion. FEMORAL ARTERIES: There is atherosclerosis of the right common femoral artery without evidence of flow-limiting stenosis. Flow is present in the proximal profunda femoris. There is occlusion of the superficial femoral artery  just beyond the takeoff. Overlapping stents are seen throughout the right superficial femoral artery; the stent is entirely occluded. There is contiguous occlusion of the superficial femoral artery beyond to the stents extending to the femoral-popliteal junction. POPLITEAL ARTERY: Flow is present proximally, but there is high-grade luminal narrowing of the central aspect of the popliteal arterial lumen (for instance, series 5, image 269). This extends over a 2.3 cm length segment. There appears to be contiguous patency. RUN-OFF ARTERIES: High-grade narrowing of the tibial peroneal trunk is noted. There appears to be essentially three-vessel runoff; short segment high-grade narrowing of the anterior and posterior tibial arteries are seen with distal reconstitution. Flow is present in the dorsalis pedis. LEFT LEG ILIAC ARTERIES: Atherosclerotic vascular calcifications are present without hemodynamically significant stenosis or occlusion. FEMORAL ARTERIES: There is mild atherosclerosis of the common femoral artery. There may be a focal contained dissection or thrombus involving the distal common femoral artery (for example, series 5, image 121) contributing to mild luminal narrowing. Flow  is identified in the proximal aspect of the deep femoral artery. Atherosclerotic vascular calcifications are seen throughout the superficial femoral artery, contribute to mild-moderate luminal narrowing. POPLITEAL ARTERY: Contiguous patency is evident without hemodynamically significant stenosis or occlusion. RUN-OFF ARTERIES: There appears to be three-vessel runoff with somewhat thready flow.  NON-ANGIOGRAPHIC FINDINGS: COMMENT: Interpretation is made in the context of the arterial phase of contrast-enhancement. LUNG BASES: The distal aspect of epicardial electrodes are partially visualized. The heart is normal in size. There is no visible pulmonary or pleural disease. LIVER: Nonspecific low density of the hepatic parenchyma may  represent underlying hepatic steatosis. No enlargement, atrophy, or significant focal lesion is identified within the parameters of this arterially enhanced study. BILIARY: Cholelithiasis is present. There is no intraluminal dilatation, gallbladder wall thickening, or pericholecystic inflammatory stranding. PANCREAS: There is mild diffuse fatty replacement without discernible lesion, fluid collection, or ductal dilatation. SPLEEN: No enlargement. A splenule is seen in the left upper quadrant. ADRENALS: No defined mass or abnormal enlargement. KIDNEYS: Probable renal vascular calcifications are noted. No definite renal or ureteral calculi are identified. There is no hydronephrosis or hydroureter. No perinephric or periureteric fat stranding is appreciated. The kidneys demonstrate symmetric enhancement. GI/MESENTERY: A small hiatal hernia is evident. Apparent gastric wall thickening is likely secondary to underdistention. There is no evidence of bowel obstruction. The appendix is not seen, and suture material is present at the base of the cecum, consistent with the provided history of appendectomy. A heavy stool burden is demonstrated. Postoperative changes of subtotal sigmoid colectomy are apparent with primary colocolonic anastomosis. URINARY BLADDER: Distended without visible calculus or focal wall thickening. PELVIC NODES: A borderline-size left external iliac chain lymph node measures 0.9 x 1.6 cm. PELVIC ORGANS: The prostate is enlarged, measuring 5.9 cm transversely. Coarse prostatic calcifications are seen. Deep pelvic calcifications likely represent phleboliths. RETROPERITONEUM: No mass or lymphadenopathy is apparent. BONES: Multilevel degenerative changes are seen throughout the spine. Degenerative changes of the hips and knees are noted. SOFT TISSUES: Extensive midline ventral abdominal scarring is present. Loops of bowel are closely opposed to the ventral abdominal wall, suggesting underlying adhesion  formation. Bilateral fat-containing inguinal hernias are evident. There is diffuse subcutaneous edema of the left calf. Surgical clips are present in the left inguinal femoral region. OTHER: No free air or fluid is seen in the abdomen or pelvis.     CONCLUSION: 1.  Right Lower Extremity: Occlusion of the right superficial femoral artery just beyond the takeoff; overlapping stents throughout the right superficial femoral artery completely occluded; occlusion of the distal superficial femoral artery is seen beyond the stents with distal reconstitution at the femoral-popliteal junction. There is high-grade stenosis of the distal popliteal artery and involving the tibioperoneal trunk. Multifocal high-grade narrowing is seen throughout the runoff arteries, but  there is contiguous patency to the level of the ankle. 2.  Left Lower Extremity: Atherosclerosis of the common femoral artery with focal contained dissection of the distal common femoral artery causing mild luminal narrowing; mild-moderate luminal stenosis throughout the superficial femoral artery; three-vessel runoff. 3.  Cholelithiasis without CT evidence of a healed location. 4.  Postoperative change of subtotal colectomy. 5.  Status post appendectomy. 6.  Prostatomegaly with probable chronic outlet stricture. 7.  Lesser incidental findings as above. Electronically Verified and Signed by Attending Radiologist: Seb Porter MD 7/15/2025 7:30 PM Workstation: [id number]    IR ANGIOPLASTY LOWER EXT. ENDO REVASC  Result Date: 7/7/2025  PROCEDURE: Left lower extremity arteriogram, common femoral and superficial femoral angioplasty utilizing Shockwave lithoplasty balloon and paclitaxel drug coated balloon Procedural Personnel Attending(s): Taiwo Greenfield Pre-procedure diagnosis: [id number] PAD (peripheral artery disease); Post-procedure diagnosis: as above Indications: History of peripheral artery disease, with worsening left lower extremity claudication and onset of  ischemic rest pain ([place] 4) Additional clinical history: History of previous right-sided superficial femoral stent Complications: No immediate complications.     IMPRESSION: Left lower extremity arteriogram demonstrates multilevel peripheral arterial occlusive disease, with tight left common femoral stenosis, segmental total occlusion of the distal left superficial femoral artery, anterior tibial occlusion. The femoral lesions were repaired with angioplasty, utilizing Shockwave lithoplasty and paclitaxel drug coated balloon, with restoration of wide patency and flow and increased Doppler signals distally. ____________________________________________________________________________________ PROCEDURE DETAILS: PROCEDURE SUMMARY: - Arterial puncture guidance: Ultrasound-guided access -Right common femoral artery Pre-procedure Consent: Informed consent for the procedure was obtained and time-out was performed prior to the procedure. Preparation: The site was prepared and draped using maximal sterile barrier technique including cutaneous antisepsis. Anesthesia/sedation: Level of anesthesia/sedation: Moderate sedation (conscious sedation) Anesthesia/sedation administered by: Nurse or other independent trained observer under attending supervision with continuous monitoring of the patient's level of consciousness and physiologic status, under the direct supervision of the attending physician  \"MD intra-service time in sedation\" : 79 minutes  Access Local anesthesia was administered. Ultrasound-guided access with permanent image stored Vessel accessed: Right common femoral artery Access technique: Micropuncture set with 21 gauge needle Procedural Details: Following informed consent, the right groin was prepped and draped in sterile fashion. Ultrasound localize a patent, pulsatile right common femoral artery with vascular calcifications. Utilizing ultrasound guidance, the right common femoral artery was accessed,  utilizing micropuncture technique, with advancement of the guidewire centrally. A 6 Thai sheath was placed. Sos Omni cath was advanced over guidewire and across the aortic bifurcation from right to left into the left common iliac artery. Left iliac arteriogram was performed, follows the catheter was advanced further to the common femoral artery for left lower extremity arteriography Initial findings are as follows: Iliac arteries are patent. There is moderately severe (approximately 70%) irregular narrowing of the left common femoral artery. The left superficial femoral artery is diffusely diseased, and there is a chronic, approximately 12 cm length total occlusion  in the distal superficial femoral segment. Collaterals reconstitute the popliteal artery. Below the level of the knee, the anterior tibial artery is occluded in its entirety. There is two-vessel distal runoff via peroneal and diseased posterior tibial arteries. The patient was fully heparinized, a 6 Thai sheath was passed across the aortic bifurcation from right to left. The left common femoral and superficial femoral occlusions were crossed with guidewire, and angioplasty of the superficial femoral segment was performed utilizing 3 mm balloon for predilatation, followed by 5 mm x 80 mm E8 Shockwave lipoplasty balloon. Following several balloon inflations with activation of the catheter, there was restoration of patent lumen and flow, and repeat angioplasty  performed with 5 mm x 1 to 50 mm Lutonix paclitaxel drug coated balloon procedure resulted in restoration of wide patency and flow at this level. The left common femoral stenosis was angioplastied initially using a 6 mm x 2 cm balloon, followed by 6 mm x 4 cm paclitaxel drug coated balloon, with restoration of wide patency and flow. At the completion of the procedure, the restoration of strong biphasic posterior tibial signal. Sheath was withdrawn to the right, and right lower extremity  arteriogram demonstrates patency of the previously placed superficial femoral stent, though there is a tight (90%) focal stenosis at the distal edge of the stent. The patient will return for right-sided intervention at a later date. Closure Access site angiography performed: Yes Findings: Calcified common femoral artery Arterial closure technique: Angio-Seal Extended manual compression required: No  Imaging Guidance: Fluoroscopy and Ultrasound Radiation: 17.5 minutes, 150 mGy Contrast: 85 cc Isovue Attestation I, Taiwo Greenfield, attest that I was present for the entire procedure. I reviewed the stored images and agree with the report as written.  Electronically Verified and Signed by Attending Radiologist: Taiwo Greenfield MD 7/7/2025 2:37 PM Workstation: [id number]    CTA LOWER EXTREMITY BILATERAL (W+WO) (CPT=73706-50)  Result Date: 6/27/2025  PROCEDURE: CTA LOWER EXTREMITY BILATERAL (W+WO) (CPT=73706-50) INDICATIONS: [id number] PAD (peripheral artery disease); PATIENT STATED HISTORY: PAD (peripheral artery disease) COMPARISON: 02/06/2024 CTA LOWER EXTREMITY BILATERAL (W+WO) (CPT=73706-50) TECHNIQUE: Noncontrast and arterial phase CT scans were obtained from the iliac crests through the lower extremities. MIP reconstructed images of the arteries are also submitted for interpretation, obtained from the post contrast exam. CONTRAST USED: IOPAMIDOL 76% IV SOLN FOR POWER INJECTOR:90 mL FINDINGS: ABDOMINAL AORTA: There is atherosclerotic calcification of the abdominal aorta extending into bilateral iliac arteries.  RIGHT LEG AORTO-ILIAC: There is moderate atherosclerotic calcification of the common iliac artery extending into the right internal and external iliac arteries. Moderate grade stenosis of the internal and external iliac arteries without occlusion. FEMORAL ARTERY: Since the prior exam, there has been placement of a stent seen involving nearly the entire superficial femoral artery. The stent is patent. Profunda  femoris artery is patent. POPLITEAL ARTERY: Moderate grade atherosclerotic calcification and noncalcified atheromatous plaque seen throughout the origin of the popliteal artery. No focal occlusion or aneurysmal dilation. RUN-OFF ARTERIES: Diminutive and heavily calcified calf vessels which limit evaluation. LEFT LEG AORTO-ILIAC: There is moderate atherosclerotic calcification of the common iliac artery extending into the left internal and external iliac arteries. Moderate grade stenosis of the internal and external iliac arteries without occlusion. FEMORAL ARTERY: Moderate atherosclerotic calcification and noncalcified atheromatous plaques throughout the common femoral artery as well as within the superficial femoral artery throughout its entire course. High-grade stenosis and narrowing involving the proximal superficial femoral artery extending into the mid aspect of the SFA. Since the prior exam, there has been development of an approximate 10 cm segment of complete occlusion involving the mid and distal aspect of the superficial femoral artery  extending into the abductor canal.. POPLITEAL ARTERY: Moderate grade atherosclerotic calcification and noncalcified atheromatous plaque seen throughout the origin of the popliteal artery. No focal occlusion or aneurysmal dilation. RUN-OFF ARTERIES: Diminutive and heavily calcified calf vessels which limit evaluation. PELVIS: Bladder wall thickening greater than what is expected for under distention, likely due to chronic bladder outlet obstruction from an enlarged prostate. Bilateral inguinal hernia containing pelvic fat. No bowel herniation. No pelvic lymphadenopathy. BONES: There is degenerative disease of the thoracic and lumbar spine. Degenerative changes are seen within the sacroiliac joints and pubic symphysis. Degenerative changes are seen in the bilateral hips. Degenerative changes seen within the bilateral knees, ankles and feet.     CONCLUSION: Placement of a  stent within the right superficial femoral artery which is patent. Patent right lower extremity vasculature. Since the prior exam, there has been development of a 10 cm segment of complete occlusion involving the mid and distal left superficial femoral artery. Retrograde filling involving the left popliteal artery. Diminutive and heavily calcified calf vessels limit evaluation but is unchanged. Multiple other incidental findings as described in the body of the report. Electronically Verified and Signed by Attending Radiologist: Liang White MD 6/27/2025 1:25 PM Workstation: [id number]           ASSESSMENT AND PLAN:     The patient is a 74 year old male who who has developed occlusion of his right superficial femoral artery with continued pain in the right groin area after placement of an Angio-Seal closure device.  This appears to be a complication of the closure device.  The patient's pain is likely due to minor irritation or perhaps injury of the superficial femoral nerve fibers.  The right superficial femoral artery stents were patent prior to the procedure and the patient's right foot was much warmer according to his wife.  I have recommended that we perform a focal endarterectomy of the right common femoral artery and the proximal superficial femoral artery with a thrombectomy of the superficial femoral artery.  I did warn the patient and his wife that I suspect that the longevity of the thrombectomy will be limited given that he had a has previously occluded the stents twice in the past.  At least, the patient will be living off of his profunda and we would be able to free a the nerve fibers that are causing him the significant pain. We have discussed the risks and benefits of a femoral endarterectomy in detail. The benefits are to improve perfusion while the risks are that of an MI, stroke, hematoma, thrombosis, nerve injury resulting in temporary or permanent deficits, infection, recurrent stenosis, edema,  wound complications and possible need for a bypass among other complications.     The patient indicated an understanding of these issues and agreed to the plan and all questions were answered.     Thank you for allowing to participate in the patient's care.         Samer F. Najjar, MD    Vascular Surgery                        [1]   Past Medical History:   Atherosclerosis of coronary artery    CAD (coronary artery disease)    Cancer (HCC)    Colon cancer (HCC)    COVID-19 virus infection    Oct 2020    Diabetes (HCC)    DM2 (diabetes mellitus, type 2) (HCC)    Essential hypertension    HTN (hypertension)    Hyperlipidemia    Other abnormal glucose    rubber band hemorroid    Paroxysmal atrial fibrillation (HCC)   [2]   Past Surgical History:  Procedure Laterality Date    Cardiac pacemaker placement      Other      MAZE    Other      CABAG    Other      appendectomy    Other      pacer    Other       Status post hemicolectomy, July 2019   [3]   Current Facility-Administered Medications:     sodium chloride 0.9% infusion, , Intravenous, Continuous    [Transfer Hold] acetaminophen (Tylenol Extra Strength) tab 500 mg, 500 mg, Oral, Q4H PRN    [Transfer Hold] ondansetron (Zofran) 4 MG/2ML injection 4 mg, 4 mg, Intravenous, Q6H PRN    [Transfer Hold] metoclopramide (Reglan) 5 mg/mL injection 5 mg, 5 mg, Intravenous, Q8H PRN    [Transfer Hold] temazepam (Restoril) cap 15 mg, 15 mg, Oral, Nightly PRN    [Transfer Hold] morphINE PF 2 MG/ML injection 1 mg, 1 mg, Intravenous, Q2H PRN **OR** [Transfer Hold] morphINE PF 2 MG/ML injection 2 mg, 2 mg, Intravenous, Q2H PRN **OR** [Transfer Hold] morphINE PF 4 MG/ML injection 4 mg, 4 mg, Intravenous, Q2H PRN    [Transfer Hold] aspirin DR tab 81 mg, 81 mg, Oral, Nightly    [Held by provider] lisinopril (Prinivil; Zestril) tab 20 mg, 20 mg, Oral, BID    [Transfer Hold] metoprolol succinate ER (Toprol XL) 24 hr tab 100 mg, 100 mg, Oral, BID    [Transfer Hold] ALPRAZolam (Xanax) tab 0.5  mg, 0.5 mg, Oral, BID PRN    [Transfer Hold] furosemide (Lasix) tab 60 mg, 60 mg, Oral, Daily    [Transfer Hold] glucose (Dex4) 15 GM/59ML oral liquid 15 g, 15 g, Oral, Q15 Min PRN **OR** [Transfer Hold] glucose (Glutose) 40% oral gel 15 g, 15 g, Oral, Q15 Min PRN **OR** [Transfer Hold] glucose-vitamin C (Dex-4) chewable tab 4 tablet, 4 tablet, Oral, Q15 Min PRN **OR** [Transfer Hold] dextrose 50% injection 50 mL, 50 mL, Intravenous, Q15 Min PRN **OR** [Transfer Hold] glucose (Dex4) 15 GM/59ML oral liquid 30 g, 30 g, Oral, Q15 Min PRN **OR** [Transfer Hold] glucose (Glutose) 40% oral gel 30 g, 30 g, Oral, Q15 Min PRN **OR** [Transfer Hold] glucose-vitamin C (Dex-4) chewable tab 8 tablet, 8 tablet, Oral, Q15 Min PRN    [Transfer Hold] insulin aspart (NovoLOG) 100 Units/mL FlexPen 1-5 Units, 1-5 Units, Subcutaneous, TID CC    [Transfer Hold] hydrALAzine (Apresoline) 20 mg/mL injection 10 mg, 10 mg, Intravenous, Q6H PRN

## 2025-07-16 NOTE — ED QUICK NOTES
Patient en route w/ transport. All questions answered and no concerns at time of discharged from ED. Patient A&Ox4, vitals stable and denies SOB/CP/Dizziness and no pain.

## 2025-07-16 NOTE — ED PROVIDER NOTES
Patient Seen in: [facility] Emergency Department    History     Chief Complaint   Patient presents with    Postop/Procedure Problem       HPI    Patient presents to the ED from interventional radiology.  Patient had angioplasty on Friday to treat a left femoral occlusion.  Arterial access via the right femoral artery during this procedure and this artery was noted to be occluded today by IR.    History reviewed. Past Medical History[1]    History reviewed. Past Surgical History[2]      Medications :  Prescriptions Prior to Admission[3]     Family History[4]    Smoking Status: Social Hx on file[5]    Constitutional and vital signs reviewed.      Social History and Family History elements reviewed from today, pertinent positives to the presenting problem noted.    Physical Exam     ED Triage Vitals [07/15/25 1749]   /59   Pulse 54   Resp 20   Temp 97.4 °F (36.3 °C)   Temp src Temporal   SpO2 98 %   O2 Device None (Room air)       All measures to prevent infection transmission during my interaction with the patient were taken. Handwashing was performed prior to and after the exam.  Stethoscope and any equipment used during my examination was cleaned with super sani-cloth germicidal wipes following the exam.     Physical Exam  Vitals and nursing note reviewed.   Constitutional:       General: He is not in acute distress.     Appearance: He is well-developed. He is obese. He is not ill-appearing or toxic-appearing.   HENT:      Head: Normocephalic and atraumatic.   Eyes:      General:         Right eye: No discharge.         Left eye: No discharge.      Conjunctiva/sclera: Conjunctivae normal.   Neck:      Trachea: No tracheal deviation.   Cardiovascular:      Rate and Rhythm: Normal rate.      Comments: No dopplerable pulses in the right dorsalis pedis  Pulmonary:      Effort: Pulmonary effort is normal. No respiratory distress.   Abdominal:      General: There is no distension.      Palpations: Abdomen is  soft.   Musculoskeletal:         General: No deformity.   Skin:     General: Skin is warm and dry.   Neurological:      Mental Status: He is alert and oriented to person, place, and time.   Psychiatric:         Mood and Affect: Mood normal.         Behavior: Behavior normal.         ED Course        Labs Reviewed   BASIC METABOLIC PANEL (8) - Abnormal; Notable for the following components:       Result Value    Potassium 5.2 (*)     BUN 31 (*)     Creatinine 1.80 (*)     Calculated Osmolality 296 (*)     eGFR-Cr 39 (*)     All other components within normal limits   POCT GLUCOSE - Abnormal; Notable for the following components:    POC Glucose  69 (*)     All other components within normal limits   POCT GLUCOSE - Abnormal; Notable for the following components:    POC Glucose  191 (*)     All other components within normal limits   PROTHROMBIN TIME (PT) - Normal   PTT, ACTIVATED - Normal   POCT GLUCOSE - Normal   CBC WITH DIFFERENTIAL WITH PLATELET   PTT, ACTIVATED   PTT, ACTIVATED   BASIC METABOLIC PANEL (8)   CBC WITH DIFFERENTIAL WITH PLATELET     EKG    Rate, intervals and axes as noted on EKG Report.  Rate: Bradycardic, 56 bpm  Rhythm: Paced rhythm  Reading: Ventricular paced rhythm, abnormal EKG         As Interpreted by me    Imaging Results Available and Reviewed while in ED: CTA ABDOMEN/PELVIS LOWER EXT BILAT W RUNOFF (CPT=75635)  Result Date: 7/15/2025  CONCLUSION: 1.  Right Lower Extremity: Occlusion of the right superficial femoral artery just beyond the takeoff; overlapping stents throughout the right superficial femoral artery completely occluded; occlusion of the distal superficial femoral artery is seen beyond the stents with distal reconstitution at the femoral-popliteal junction. There is high-grade stenosis of the distal popliteal artery and involving the tibioperoneal trunk. Multifocal high-grade narrowing is seen throughout the runoff arteries, but  there is contiguous patency to the level of the  ankle. 2.  Left Lower Extremity: Atherosclerosis of the common femoral artery with focal contained dissection of the distal common femoral artery causing mild luminal narrowing; mild-moderate luminal stenosis throughout the superficial femoral artery; three-vessel runoff. 3.  Cholelithiasis without CT evidence of a healed location. 4.  Postoperative change of subtotal colectomy. 5.  Status post appendectomy. 6.  Prostatomegaly with probable chronic outlet stricture. 7.  Lesser incidental findings as above. Electronically Verified and Signed by Attending Radiologist: Seb Porter MD 7/15/2025 7:30 PM Workstation: [id number]    ED Medications Administered:   Medications   heparin (Porcine) 35742 units/250mL infusion PE/DVT/THROMBUS CONTINUOUS (has no administration in time range)   sodium chloride 0.9% infusion ( Intravenous New Bag 7/15/25 2219)   acetaminophen (Tylenol Extra Strength) tab 500 mg (has no administration in time range)   ondansetron (Zofran) 4 MG/2ML injection 4 mg (has no administration in time range)   metoclopramide (Reglan) 5 mg/mL injection 5 mg (has no administration in time range)   temazepam (Restoril) cap 15 mg (has no administration in time range)   morphINE PF 2 MG/ML injection 1 mg ( Intravenous See Alternative 7/15/25 2101)     Or   morphINE PF 2 MG/ML injection 2 mg ( Intravenous See Alternative 7/15/25 2101)     Or   morphINE PF 4 MG/ML injection 4 mg (4 mg Intravenous Given 7/15/25 2101)   aspirin DR tab 81 mg (81 mg Oral Given 7/15/25 2101)   lisinopril (Prinivil; Zestril) tab 20 mg ( Oral Automatically Held 7/21/25 2100)   metoprolol succinate ER (Toprol XL) 24 hr tab 100 mg (100 mg Oral Given 7/15/25 2101)   ALPRAZolam (Xanax) tab 0.5 mg (has no administration in time range)   furosemide (Lasix) tab 60 mg (has no administration in time range)   glucose (Dex4) 15 GM/59ML oral liquid 15 g (15 g Oral Given 7/15/25 2049)     Or   glucose (Glutose) 40% oral gel 15 g ( Oral See  Alternative 7/15/25 2049)     Or   glucose-vitamin C (Dex-4) chewable tab 4 tablet ( Oral See Alternative 7/15/25 2049)     Or   dextrose 50% injection 50 mL ( Intravenous See Alternative 7/15/25 2049)     Or   glucose (Dex4) 15 GM/59ML oral liquid 30 g ( Oral See Alternative 7/15/25 2049)     Or   glucose (Glutose) 40% oral gel 30 g ( Oral See Alternative 7/15/25 2049)     Or   glucose-vitamin C (Dex-4) chewable tab 8 tablet ( Oral See Alternative 7/15/25 2049)   insulin aspart (NovoLOG) 100 Units/mL FlexPen 1-5 Units (has no administration in time range)   hydrALAzine (Apresoline) 20 mg/mL injection 10 mg (has no administration in time range)   heparin (Porcine) 1000 UNIT/ML injection - BOLUS IV 6,500 Units (6,500 Units Intravenous Given 7/15/25 1854)   heparin (Porcine) 28975 units/250 mL infusion ED (PE/DVT/THROMBUS) INITIAL DOSE (18 Units/kg/hr × 81.6 kg Intravenous Handoff 7/15/25 1913)   iopamidol 76% (ISOVUE-370) injection for power injector (90 mL Intravenous Given 7/15/25 1853)         MDM     Vitals:    07/15/25 1954 07/15/25 1955 07/15/25 2007 07/15/25 2053   BP:   150/57    BP Location:   Right arm    Pulse:   52 57   Resp:   18    Temp:   97.8 °F (36.6 °C)    TempSrc:   Oral    SpO2:   97%    Weight: 81.8 kg 81.7 kg     Height:   170.2 cm (5' 7\")      *I personally reviewed and interpreted all ED vitals.    Pulse Ox: 97%, Room air, Normal     Monitor Interpretation:   normal sinus rhythm, sinus bradycardia as interpreted by me.  The cardiac monitor was ordered to monitor heart rate.    Differential Diagnosis/ Diagnostic Considerations: Right superficial arterial occlusion, other    Complicating Factors: The patient already has does not have any pertinent problems on file. to contribute to the complexity of this ED evaluation.    Medical Decision Making  The patient presents to the ED with a suspected right femoral arterial occlusion.  This was confirmed on CTA with runoff.  Patient started on heparin.   I discussed with Dr. Najjar for admission.  N.p.o. after midnight.  Discussed with Dr. Marianna mckeon.    Problems Addressed:  Femoral artery occlusion, right: acute illness or injury that poses a threat to life or bodily functions    Amount and/or Complexity of Data Reviewed  Labs: ordered. Decision-making details documented in ED Course.  Radiology: ordered and independent interpretation performed. Decision-making details documented in ED Course.     Details: I personally viewed the patient's CTA abdomen pelvis with runoff and noted a right superficial femoral artery occlusion  ECG/medicine tests: ordered and independent interpretation performed. Decision-making details documented in ED Course.  Discussion of management or test interpretation with external provider(s):  I discussed with Dr. Najjar for admission.    Risk  Risk Details: Upon my evaluation, this patient had a high probability of imminent or life-threatening deterioration due to critical findings of imaging and unexpected postoperative complications, which required my direct attention, intervention, and personal management.    I have personally provided 42 minutes of critical care time, exclusive of time spent on separately billable procedures.  Time includes review of all pertinent laboratory/radiology results, discussion with consultants, and monitoring for potential decompensation.  Performed interventions included   Heparin administration          Condition upon leaving the department: Stable    Disposition and Plan     Clinical Impression:  1. Femoral artery occlusion, right        Disposition:  Admit    Follow-up:  No follow-up provider specified.    Medications Prescribed:  Current Discharge Medication List          Hospital Problems       Present on Admission  Date Reviewed: 5/6/2025          ICD-10-CM Noted POA    * (Principal) Femoral artery occlusion, right I70.201 7/15/2025 Unknown    Femoral artery thrombosis, right (HCC) I74.3  7/15/2025 Unknown                       [1]   Past Medical History:   Atherosclerosis of coronary artery    CAD (coronary artery disease)    Cancer (HCC)    Colon cancer (HCC)    COVID-19 virus infection    Oct 2020    Diabetes (HCC)    DM2 (diabetes mellitus, type 2) (HCC)    Essential hypertension    HTN (hypertension)    Hyperlipidemia    Other abnormal glucose    rubber band hemorroid    Paroxysmal atrial fibrillation (HCC)   [2]   Past Surgical History:  Procedure Laterality Date    Cardiac pacemaker placement      Other      MAZE    Other      CABAG    Other      appendectomy    Other      pacer    Other       Status post hemicolectomy, July 2019   [3]   Medications Prior to Admission   Medication Sig Dispense Refill Last Dose/Taking    acetaminophen 500 MG Oral Tab Take 2 tablets (1,000 mg total) by mouth 3 (three) times daily as needed for Pain.   7/15/2025 at 11:00 AM    glipiZIDE ER (GLIPIZIDE XL) 10 MG Oral Tablet 24 Hr Take 1 tablet (10 mg total) by mouth in the morning and 1 tablet (10 mg total) before bedtime. 180 tablet 3 7/15/2025 at  8:30 AM    metoprolol succinate  MG Oral Tablet 24 Hr Take 1 tablet (100 mg total) by mouth 2 (two) times daily. 180 tablet 3 7/15/2025 at  8:30 AM    lisinopril 20 MG Oral Tab TAKE 1 TABLET(20 MG) BY MOUTH TWICE DAILY 180 tablet 1 7/15/2025 at  8:30 AM    aspirin 81 MG Oral Tab EC Take 1 tablet (81 mg total) by mouth daily. (Patient taking differently: Take 1 tablet (81 mg total) by mouth at bedtime.) 90 tablet 3 7/14/2025 at  9:00 PM    Capsaicin 0.1 % External Cream Apply 1 Application topically 2 (two) times daily as needed. 60 g 0 Taking As Needed    ALPRAZOLAM 0.5 MG Oral Tab TAKE 1 TABLET(0.5 MG) BY MOUTH TWICE DAILY AS NEEDED FOR ANXIETY OR SLEEP 60 tablet 5 Taking As Needed    furosemide 40 MG Oral Tab Take 1 tablet (40 mg total) by mouth 2 (two) times daily as needed (Swelling in the feet). (Patient taking differently: Take 1.5 tablets (60 mg total) by  mouth daily.) 180 tablet 3 7/15/2025 at  8:30 AM    clopidogrel 75 MG Oral Tab Take 1 tablet (75 mg total) by mouth daily. (Patient taking differently: Take 1 tablet (75 mg total) by mouth at bedtime.) 90 tablet 3 7/14/2025 at  9:00 PM   [4] History reviewed. No pertinent family history.  [5]   Social History  Socioeconomic History    Marital status:    Tobacco Use    Smoking status: Former    Smokeless tobacco: Never   Vaping Use    Vaping status: Every Day    Substances: Nicotine    Devices: Refillable tank   Substance and Sexual Activity    Alcohol use: No     Alcohol/week: 0.0 standard drinks of alcohol    Drug use: No

## 2025-07-16 NOTE — OPERATIVE REPORT
[facility]     PATIENTS NAME: Geovanny Prabhakar  ATTENDING PHYSICIAN: Florencia Dennis MD  OPERATING PHYSICIAN: Samer F Najjar, MD  CSN: [id number]     LOCATION:  OR  MRN: [id number]    YOB: 1950  ADMISSION DATE: 7/15/2025  OPERATION DATE: 7/16/2025                OPERATIVE REPORT     PRE-OPERATIVE DIAGNOSIS:  Right lower extremity subacute ischemia secondary to occlusion of the superficial femoral artery     POST-OPERATIVE DIAGNOSIS: Same as above.     PROCEDURE PERFORMED:   Right common and proximal superficial  artery endarterectomy with bovine Xenosure patch repair  Right superficial femoral artery Nissa balloon thrombectomy     ANESTHESIA: General    SURGEON: Samer F Najjar, MD    ASSISTANT: Chago Sterling SA    EBL: 50 ml    SPECIMENS: * No specimens in log *    COMPLICATIONS: None    SUMMARY OF CASE: The closure device was present in the proximal superficial femoral artery.  This required a long endarterectomy involving the common femoral and the proximal superficial femoral artery.  The superficial femoral artery stents were occluded and those required a Nissa thrombectomy.      INDICATIONS: The patient is a 74 year old male who had undergone a left lower extremity angioplasty by Dr. Greenfield for claudication and rest pain symptoms about a week ago.  He was doing well up until a few days ago when he started having discomfort in his right groin area and throbbing pain in his right foot.   A CT angiogram of the abdomen and pelvis with lower extremity runoff revealed patency of the right common femoral artery but with occlusion of the superficial femoral artery and continued patency of the profunda. The patient has previously undergone stenting of the right superficial femoral artery twice.  The patient did not have motor or sensory deficits but does describe having shooting pain behind his thigh down to the calf areas.  He does have a history of neuropathy.   A femoral endarterectomy was  recommended with thrombectomy of the superficial femoral artery with possible bypass. We have discussed the risks and benefits of the procedure in detail. The benefits are to improve his claudication symptoms while the risks are that of an MI, stroke, bleeding with hematoma that may require evacuation, thrombosis, femoral nerve injury resulting in temporary or permanent deficits with pain or numbness, infection, recurrent stenosis, and death among other complications.  The patient understood and all questions were answered.    DESCRIPTION OF PROCEDURE:  The patient was placed supine on the operating table. The arms were placed at 80 degrees on an arm borad. Normal bony prominences were padded. The anesthesia team placed appropriate lines and general anesthesia was induced. A Spann catheter was placed under sterile technique. The patient's lower abdomen and both lower extremities were circumferentially prepped and draped in the usual sterile fashion. Preoperative antibiotics were administered prior to skin incision.    A vertical skin incision overlying the right common femoral artery area was made. The incision was deepened through the subcutaneous tissues with electrocautery. The encountered lymphatics were ligated and divided. The common femoral artery was then exposed and sharply dissected circumferentially. The dissection was extended proximally to the inguinal ligament and distally to include the superficial femoral and profunda femoris arteries.  There was an Angio-Seal closure device at attached to the proximal superficial femoral artery.  Therefore a long dissection of the superficial femoral artery was performed.  The common femoral, superficial femoral, and profunda femoris arteries were encircled with silastic vessel loops. Minor branches of the common femoral artery were identified and spared.  Then, 80 U/kg of heparin were administered intravenously and, after three minutes, the common femoral,  superficial femoral, and profunda femoris arteries were clamped with atraumatic vascular clamps.  An arteriotomy was performed on the anterior surface of the common femoral artery with a #11 blade scalpel and extended into the bifurcation using a Pott’s scissors.  The clamped arteries were then flushed with concentrated heparinized saline and re-clamped.  The plaque was calcified. The endarterectomy plane was developed with a Arrington elevator. The plaque was transected proximally  while distally the plaque was feathered off, leaving a smooth endpoint. The endarterectomized surface was gently irrigated with heparinized saline solution. All remaining free debris was removed with a fine forceps. The distal endarterectomy endpoint and the proximal superficial femoral artery was inspected. Tacking sutures using few interrupted 7-0 prolene sutures were used to tack down the distal endpoint to secure the distal intima.  Then, I used a #4 Nissa catheter to thrombectomize the occluded superficial femoral artery with retrieval of subacute thrombus.  This was done 3 times until backbleeding was achieved and the superficial femoral artery was irrigated and then reclamped.  The arteriotomy was closed using  patch angioplasty. A bovine Xenosure patch angioplasty was performed using a continuous 6-0 Prolene suture. The suture was started at the apex of the arteriotomy in the common femoral artery and ran on each side. The common femoral, superficial femoral, and profunda femoris arteries were back-bled. The common femoral artery was forward-bled. The femoral lumen was then irrigated with heparinized saline.  Flow was first re-established into the profunda femoris artery and then into the superficial femoral artery.  The suture line was checked for hemostasis. Needle hole bleeding and hemostasis was achieved with pressure and topical application of thrombin gel-foam. Doppler evaluation revealed excellent flow signals in the common  femoral, superficial femoral, and profunda femoris arteries.  The wound was then irrigated with antibiotic solution.  After ensuring hemostasis, the wounds were closed using two layers of 2-0 and 3-0 Vicryl for the soft tissues. The skin was closed with a running subcuticular closure 4-0 Vicryl suture. The incision area was injected with a 0.5% Marcaine solution followed by Dermabond, then a dry sterile dressing and a Tegaderm was applied.  The right foot was inspected and was noted to be warm with acceptable Doppler flow signals. The patient was then awakened and taken to the postanesthesia care unit in a stable condition.

## 2025-07-16 NOTE — ANESTHESIA PROCEDURE NOTES
Peripheral IV  Date/Time: 7/16/2025 2:00 PM  Inserted by: Rashawn Weathers MD    Placement  Needle size: 16 G  Laterality: right  Location: forearm  Local anesthetic: none  Site prep: chlorhexidine  Technique: anatomical landmarks  Attempts: 1 (Sterile dressing)

## 2025-07-17 VITALS
RESPIRATION RATE: 17 BRPM | HEART RATE: 64 BPM | DIASTOLIC BLOOD PRESSURE: 53 MMHG | HEIGHT: 67 IN | BODY MASS INDEX: 28.63 KG/M2 | OXYGEN SATURATION: 94 % | WEIGHT: 182.38 LBS | TEMPERATURE: 97 F | SYSTOLIC BLOOD PRESSURE: 128 MMHG

## 2025-07-17 LAB
ANION GAP SERPL CALC-SCNC: 11 MMOL/L (ref 0–18)
BUN BLD-MCNC: 26 MG/DL (ref 9–23)
BUN/CREAT SERPL: 17.3 (ref 10–20)
CALCIUM BLD-MCNC: 8.2 MG/DL (ref 8.7–10.4)
CHLORIDE SERPL-SCNC: 110 MMOL/L (ref 98–112)
CO2 SERPL-SCNC: 20 MMOL/L (ref 21–32)
CREAT BLD-MCNC: 1.5 MG/DL (ref 0.7–1.3)
DEPRECATED RDW RBC AUTO: 44.5 FL (ref 35.1–46.3)
EGFRCR SERPLBLD CKD-EPI 2021: 49 ML/MIN/1.73M2 (ref 60–?)
ERYTHROCYTE [DISTWIDTH] IN BLOOD BY AUTOMATED COUNT: 14.5 % (ref 11–15)
GLUCOSE BLD-MCNC: 151 MG/DL (ref 70–99)
GLUCOSE BLDC GLUCOMTR-MCNC: 130 MG/DL (ref 70–99)
GLUCOSE BLDC GLUCOMTR-MCNC: 185 MG/DL (ref 70–99)
HCT VFR BLD AUTO: 35.5 % (ref 39–53)
HGB BLD-MCNC: 11.8 G/DL (ref 13–17.5)
MCH RBC QN AUTO: 28.4 PG (ref 26–34)
MCHC RBC AUTO-ENTMCNC: 33.2 G/DL (ref 31–37)
MCV RBC AUTO: 85.3 FL (ref 80–100)
OSMOLALITY SERPL CALC.SUM OF ELEC: 300 MOSM/KG (ref 275–295)
PLATELET # BLD AUTO: 161 10(3)UL (ref 150–450)
POTASSIUM SERPL-SCNC: 4.1 MMOL/L (ref 3.5–5.1)
RBC # BLD AUTO: 4.16 X10(6)UL (ref 3.8–5.8)
SODIUM SERPL-SCNC: 141 MMOL/L (ref 136–145)
WBC # BLD AUTO: 8 X10(3) UL (ref 4–11)

## 2025-07-17 PROCEDURE — 99239 HOSP IP/OBS DSCHRG MGMT >30: CPT | Performed by: HOSPITALIST

## 2025-07-17 RX ORDER — HYDROCODONE BITARTRATE AND ACETAMINOPHEN 5; 325 MG/1; MG/1
1 TABLET ORAL EVERY 6 HOURS PRN
Qty: 20 TABLET | Refills: 0 | Status: SHIPPED | OUTPATIENT
Start: 2025-07-17

## 2025-07-17 NOTE — PROGRESS NOTES
[facility]  Vascular Surgery Progress Note    Geovanny Prabhakar Patient Status:  Inpatient    1950 MRN [id number]   Location [facility] 2W/SW Attending Florencia Dennis MD   Hosp Day # 2 PCP Aaron Helton MD     Objective:   Temp: 97.1 °F (36.2 °C)  Pulse: 64  Resp: 17  BP: 128/53  AO: 132/43    Intake/Output:     Intake/Output Summary (Last 24 hours) at 2025 1357  Last data filed at 2025 1340  Gross per 24 hour   Intake 3941.8 ml   Output 1845 ml   Net 2096.8 ml       Events Yesterday/Overnight:  Doing very well following his right common femoral artery endarterectomy and thrombectomy of the superficial femoral artery.  He states that his neuropathic shooting pain has resolved.  He now he only has incisional pain.  He is very happy with the warmth back in his right leg.    Exam:  The right groin incision dressing is dry.  His foot is warm.  He has a peroneal signal.    Impression/Plan:  Patient is ready for discharge.  He is to follow-up in 2 weeks.  I will write a Qitio prescription for him.    Medications:  Current Hospital Medications[1]    Laboratory/Data:    LABS:  Recent Labs   Lab 07/15/25  1812 07/16/25  0224 07/17/25  0502   WBC 8.0 6.3 8.0   HGB 14.0 12.2* 11.8*   MCV 84.2 83.8 85.3   .0 161.0 161.0   INR 1.00  --   --        Recent Labs   Lab 07/15/25  1813 07/16/25  0224 07/17/25  0502    139 141   K 5.2* 4.2 4.1    107 110   CO2 25.0 22.0 20.0*   BUN 31* 27* 26*   CREATSERUM 1.80* 1.56* 1.50*   GLU 91 132* 151*   CA 9.8 9.0 8.2*     Recent Labs   Lab 07/15/25  1812 07/16/25  0224 07/16/25  0945   PTP 13.8  --   --    INR 1.00  --   --    PTT 32.0 130.7* 31.4     No results for input(s): \"ALT\", \"AST\", \"ALB\", \"AMYLASE\", \"LIPASE\", \"LDH\" in the last 168 hours.    Invalid input(s): \"ALPHOS\", \"TBIL\", \"DBIL\", \"TPROT\"  No results for input(s): \"TROP\" in the last 168 hours.  No results found for: \"ANAS\", \"NIKHIL\", \"ANASCRN\"  No results for input(s): \"PCACT\", \"PSACT\",  \"AT3ACT\", \"HIPAB\", \"PATHI\", \"STALA\", \"DRVVTRATIO\", \"DRVVT\", \"STACLOT\", \"CARIGG\", \"W2RW1NHIPF\", \"M5TA8GPHFZ\", \"RA\", \"HAVIGM\", \"HBCIGM\", \"HCVAB\", \"HBSAG\", \"HBCAB\", \"HBVDNAINTERP\", \"ANAS\", \"C3\", \"C4\" in the last 168 hours.    Samer F. Najjar, MD  Vascular Surgery  [facility]  7/17/2025  1:57 PM         [1]   Current Facility-Administered Medications:     ondansetron (Zofran) 4 MG/2ML injection 4 mg, 4 mg, Intravenous, Q6H PRN    sodium chloride 0.9% infusion, , Intravenous, Continuous    acetaminophen (Tylenol) tab 650 mg, 650 mg, Oral, Q4H PRN **OR** HYDROcodone-acetaminophen (Norco) 5-325 MG per tab 1 tablet, 1 tablet, Oral, Q4H PRN **OR** HYDROcodone-acetaminophen (Norco) 5-325 MG per tab 2 tablet, 2 tablet, Oral, Q4H PRN    HYDROmorphone (Dilaudid) 1 MG/ML injection 0.2 mg, 0.2 mg, Intravenous, Q2H PRN    enoxaparin (Lovenox) 40 MG/0.4ML SUBQ injection 40 mg, 40 mg, Subcutaneous, Daily    sodium chloride 0.9% infusion, , Intravenous, Continuous    metoclopramide (Reglan) 5 mg/mL injection 5 mg, 5 mg, Intravenous, Q8H PRN    temazepam (Restoril) cap 15 mg, 15 mg, Oral, Nightly PRN    morphINE PF 2 MG/ML injection 1 mg, 1 mg, Intravenous, Q2H PRN **OR** morphINE PF 2 MG/ML injection 2 mg, 2 mg, Intravenous, Q2H PRN **OR** morphINE PF 4 MG/ML injection 4 mg, 4 mg, Intravenous, Q2H PRN    aspirin DR tab 81 mg, 81 mg, Oral, Nightly    [Held by provider] lisinopril (Prinivil; Zestril) tab 20 mg, 20 mg, Oral, BID    metoprolol succinate ER (Toprol XL) 24 hr tab 100 mg, 100 mg, Oral, BID    ALPRAZolam (Xanax) tab 0.5 mg, 0.5 mg, Oral, BID PRN    furosemide (Lasix) tab 60 mg, 60 mg, Oral, Daily    glucose (Dex4) 15 GM/59ML oral liquid 15 g, 15 g, Oral, Q15 Min PRN **OR** glucose (Glutose) 40% oral gel 15 g, 15 g, Oral, Q15 Min PRN **OR** glucose-vitamin C (Dex-4) chewable tab 4 tablet, 4 tablet, Oral, Q15 Min PRN **OR** dextrose 50% injection 50 mL, 50 mL, Intravenous, Q15 Min PRN **OR** glucose (Dex4) 15  GM/59ML oral liquid 30 g, 30 g, Oral, Q15 Min PRN **OR** glucose (Glutose) 40% oral gel 30 g, 30 g, Oral, Q15 Min PRN **OR** glucose-vitamin C (Dex-4) chewable tab 8 tablet, 8 tablet, Oral, Q15 Min PRN    insulin aspart (NovoLOG) 100 Units/mL FlexPen 1-5 Units, 1-5 Units, Subcutaneous, TID CC    hydrALAzine (Apresoline) 20 mg/mL injection 10 mg, 10 mg, Intravenous, Q6H PRN

## 2025-07-17 NOTE — CM/SW NOTE
07/17/25 1400   Discharge disposition   Expected discharge disposition Home or Self   Discharge transportation Private car     CM received MDO for discharge today.     RN to inform patient about discharge today.     No transportation needs.     DC PLAN: Home    Meli VILLALBAN RN   Nurse    [phone]

## 2025-07-17 NOTE — PLAN OF CARE
Pt is alert & or x 4, following cammands, much emotinoal support. Prn pain meds, right foot pulse is weaker then left. Right foot is much warmer in comparison to post op it was cold. Pt had bloody urine this am. Lovenox held hs and am., Dr Najjar updated.  Spann removed as urine is now yellow. Pt has voiding clear yellow urine per bathroom. He has walked in tan, up to chair. He states he has had numbness and tingling in bilat feet for a couple of years.  Reviewed diabetic information with him and his wife pedro. Will be discharging pt to home. Self care. Reviewed discharge information iv sites removed.    Problem: Patient Centered Care  Goal: Patient preferences are identified and integrated in the patient's plan of care  Description: Interventions:  - What would you like us to know as we care for you?   - Provide timely, complete, and accurate information to patient/family  - Incorporate patient and family knowledge, values, beliefs, and cultural backgrounds into the planning and delivery of care  - Encourage patient/family to participate in care and decision-making at the level they choose  - Honor patient and family perspectives and choices  Outcome: Progressing     Problem: Patient/Family Goals  Goal: Patient/Family Long Term Goal  Description: Patient's Long Term Goal:     Interventions:  -   - See additional Care Plan goals for specific interventions  Outcome: Progressing  Goal: Patient/Family Short Term Goal  Description: Patient's Short Term Goal:     Interventions:   -   - See additional Care Plan goals for specific interventions  Outcome: Progressing     Problem: PAIN - ADULT  Goal: Verbalizes/displays adequate comfort level or patient's stated pain goal  Description: INTERVENTIONS:  - Encourage pt to monitor pain and request assistance  - Assess pain using appropriate pain scale  - Administer analgesics based on type and severity of pain and evaluate response  - Implement non-pharmacological measures as  appropriate and evaluate response  - Consider cultural and social influences on pain and pain management  - Manage/alleviate anxiety  - Utilize distraction and/or relaxation techniques  - Monitor for opioid side effects  - Notify MD/LIP if interventions unsuccessful or patient reports new pain  - Anticipate increased pain with activity and pre-medicate as appropriate  Outcome: Progressing     Problem: RISK FOR INFECTION - ADULT  Goal: Absence of fever/infection during anticipated neutropenic period  Description: INTERVENTIONS  - Monitor WBC  - Administer growth factors as ordered  - Implement neutropenic guidelines  Outcome: Progressing     Problem: SAFETY ADULT - FALL  Goal: Free from fall injury  Description: INTERVENTIONS:  - Assess pt frequently for physical needs  - Identify cognitive and physical deficits and behaviors that affect risk of falls.  - [place] fall precautions as indicated by assessment.  - Educate pt/family on patient safety including physical limitations  - Instruct pt to call for assistance with activity based on assessment  - Modify environment to reduce risk of injury  - Provide assistive devices as appropriate  - Consider OT/PT consult to assist with strengthening/mobility  - Encourage toileting schedule  Outcome: Progressing     Problem: DISCHARGE PLANNING  Goal: Discharge to home or other facility with appropriate resources  Description: INTERVENTIONS:  - Identify barriers to discharge w/pt and caregiver  - Include patient/family/discharge partner in discharge planning  - Arrange for needed discharge resources and transportation as appropriate  - Identify discharge learning needs (meds, wound care, etc)  - Arrange for interpreters to assist at discharge as needed  - Consider post-discharge preferences of patient/family/discharge partner  - Complete POLST form as appropriate  - Assess patient's ability to be responsible for managing their own health  - Refer to Case Management Department  for coordinating discharge planning if the patient needs post-hospital services based on physician/LIP order or complex needs related to functional status, cognitive ability or social support system  Outcome: Progressing     Problem: CARDIOVASCULAR - ADULT  Goal: Maintains optimal cardiac output and hemodynamic stability  Description: INTERVENTIONS:  - Monitor vital signs, rhythm, and trends  - Monitor for bleeding, hypotension and signs of decreased cardiac output  - Evaluate effectiveness of vasoactive medications to optimize hemodynamic stability  - Monitor arterial and/or venous puncture sites for bleeding and/or hematoma  - Assess quality of pulses, skin color and temperature  - Assess for signs of decreased coronary artery perfusion - ex. Angina  - Evaluate fluid balance, assess for edema, trend weights  Outcome: Progressing  Goal: Absence of cardiac arrhythmias or at baseline  Description: INTERVENTIONS:  - Continuous cardiac monitoring, monitor vital signs, obtain 12 lead EKG if indicated  - Evaluate effectiveness of antiarrhythmic and heart rate control medications as ordered  - Initiate emergency measures for life threatening arrhythmias  - Monitor electrolytes and administer replacement therapy as ordered  Outcome: Progressing     Problem: METABOLIC/FLUID AND ELECTROLYTES - ADULT  Goal: Glucose maintained within prescribed range  Description: INTERVENTIONS:  - Monitor Blood Glucose as ordered  - Assess for signs and symptoms of hyperglycemia and hypoglycemia  - Administer ordered medications to maintain glucose within target range  - Assess barriers to adequate nutritional intake and initiate nutrition consult as needed  - Instruct patient on self management of diabetes  Outcome: Progressing  Goal: Electrolytes maintained within normal limits  Description: INTERVENTIONS:  - Monitor labs and rhythm and assess patient for signs and symptoms of electrolyte imbalances  - Administer electrolyte replacement as  ordered  - Monitor response to electrolyte replacements, including rhythm and repeat lab results as appropriate  - Fluid restriction as ordered  - Instruct patient on fluid and nutrition restrictions as appropriate  Outcome: Progressing  Goal: Hemodynamic stability and optimal renal function maintained  Description: INTERVENTIONS:  - Monitor labs and assess for signs and symptoms of volume excess or deficit  - Monitor intake, output and patient weight  - Monitor urine specific gravity, serum osmolarity and serum sodium as indicated or ordered  - Monitor response to interventions for patient's volume status, including labs, urine output, blood pressure (other measures as available)  - Encourage oral intake as appropriate  - Instruct patient on fluid and nutrition restrictions as appropriate  Outcome: Progressing

## 2025-07-17 NOTE — DISCHARGE SUMMARY
[facility]  part of [facility]    Discharge Summary    Geovanny Prabhakar Patient Status:  Inpatient    1950 MRN [id number]   Location [facility] 2W/SW Attending Florencia Dennis MD   Hosp Day # 2 PCP Aaron Helton MD     Date of Admission: 7/15/2025   Date of Discharge: 2025    Hospital Discharge Diagnoses: Acute Femoral Artery Occlusion     Lace+ Score: 75  59-90 High Risk  29-58 Medium Risk  0-28   Low Risk.    TCM Follow-Up Recommendation:  LACE > 58: High Risk of readmission after discharge from the hospital.        Admitting Diagnosis: Femoral artery occlusion, right [I70.201]    Disposition: Home    Discharge Diagnosis: .Principal Problem:    Femoral artery occlusion, right  Active Problems:    Femoral artery thrombosis, right (HCC)      Hospital Course:   Reason for Admission:   Per Dr. Cabral  Patient is a 74-year-old  male who had left superficial femoral artery angioplasty and stent on , accessed from right femoral artery. He was doing well up until a few days ago when he started having discomfort in his right groin area and throbbing pain in his right foot. He was instructed by Interventional Radiology to come to the hospital for ultrasound arterial duplex which showed thrombosis of the right common and superficial femoral artery, sent to the emergency department for evaluation. CBC unremarkable. Chemistry showed potassium 5.2, GFR 39. Started on IV heparin. CT angiogram of the abdomen and pelvis with bilateral lower extremity runoff still pending. He will be admitted to the hospital for further management     Discharge Physical Exam:   Physical Exam:    General: No acute distress.   Respiratory: Clear to auscultation bilaterally. No wheezes. No rhonchi.  Cardiovascular: S1, S2. Regular rate and rhythm. No murmurs, rubs or gallops.   Abdomen: Soft, nontender, nondistended.  Positive bowel sounds. No rebound or guarding.  Neurologic: No focal  neurological deficits.   Musculoskeletal: Moves all extremities.    Hospital Course:     Femoral artery occlusion, right    Femoral artery thrombosis, right (HCC)    History of PAD     - Vascular surgery on consult  - pain control with norco  - status post focal endarterectomy of the right common femoral artery and the proximal superficial femoral artery with a thrombectomy of the superficial femoral artery POD 1  - discharge home today         CKD stage 3  - BMP daily     Mild hyperkalemia  - monitor bmp daily     DM2  - SSI low dose  -accuchecks AC and HS     CAD  - resume home meds     HTN  - stable  - continue home meds     DVT proph- heparin     Full code     MDM. High           Complications: none    Consultants         Provider   Role Specialty     Najjar, Samer F, MD      Consulting Physician SURGERY, VASCULAR          Surgical Procedures       Case IDs Date Procedure Surgeon Location Status    [id number] 7/16/25 FEMORAL ENDARTERECTOMY BYPASS Najjar, Samer F, MD [facility] MAIN OR Comp              Discharge Plan:   Discharge Condition: Stable    Current Discharge Medication List        New Orders    Details   HYDROcodone-acetaminophen 5-325 MG Oral Tab Take 1 tablet by mouth every 6 (six) hours as needed for Pain.           Home Meds - Unchanged    Details   acetaminophen 500 MG Oral Tab Take 2 tablets (1,000 mg total) by mouth 3 (three) times daily as needed for Pain.      glipiZIDE ER (GLIPIZIDE XL) 10 MG Oral Tablet 24 Hr Take 1 tablet (10 mg total) by mouth in the morning and 1 tablet (10 mg total) before bedtime.      metoprolol succinate  MG Oral Tablet 24 Hr Take 1 tablet (100 mg total) by mouth 2 (two) times daily.      lisinopril 20 MG Oral Tab TAKE 1 TABLET(20 MG) BY MOUTH TWICE DAILY      aspirin 81 MG Oral Tab EC Take 1 tablet (81 mg total) by mouth daily.      Capsaicin 0.1 % External Cream Apply 1 Application topically 2 (two) times daily as needed.      ALPRAZOLAM 0.5 MG Oral Tab TAKE 1  TABLET(0.5 MG) BY MOUTH TWICE DAILY AS NEEDED FOR ANXIETY OR SLEEP      furosemide 40 MG Oral Tab Take 1 tablet (40 mg total) by mouth 2 (two) times daily as needed (Swelling in the feet).      clopidogrel 75 MG Oral Tab Take 1 tablet (75 mg total) by mouth daily.                 Discharge Diet: Cardiac diet     Discharge Activity: As tolerated       Discharge Medications        START taking these medications        Instructions Prescription details   HYDROcodone-acetaminophen 5-325 MG Tabs  Commonly known as: Norco      Take 1 tablet by mouth every 6 (six) hours as needed for Pain.   Quantity: 20 tablet  Refills: 0            CHANGE how you take these medications        Instructions Prescription details   aspirin 81 MG Tbec  What changed: when to take this      Take 1 tablet (81 mg total) by mouth daily.   Quantity: 90 tablet  Refills: 3     clopidogrel 75 MG Tabs  Commonly known as: Plavix  What changed: when to take this      Take 1 tablet (75 mg total) by mouth daily.   Quantity: 90 tablet  Refills: 3            CONTINUE taking these medications        Instructions Prescription details   acetaminophen 500 MG Tabs  Commonly known as: Tylenol Extra Strength      Take 2 tablets (1,000 mg total) by mouth 3 (three) times daily as needed for Pain.   Refills: 0     ALPRAZolam 0.5 MG Tabs  Commonly known as: Xanax      TAKE 1 TABLET(0.5 MG) BY MOUTH TWICE DAILY AS NEEDED FOR ANXIETY OR SLEEP   Quantity: 60 tablet  Refills: 5     Capsaicin 0.1 % Crea      Apply 1 Application topically 2 (two) times daily as needed.   Quantity: 60 g  Refills: 0     furosemide 40 MG Tabs  Commonly known as: Lasix      Take 1 tablet (40 mg total) by mouth 2 (two) times daily as needed (Swelling in the feet).   Quantity: 180 tablet  Refills: 3     glipiZIDE ER 10 MG Tb24  Commonly known as: glipiZIDE XL      Take 1 tablet (10 mg total) by mouth in the morning and 1 tablet (10 mg total) before bedtime.   Quantity: 180 tablet  Refills: 3      lisinopril 20 MG Tabs  Commonly known as: Prinivil; Zestril      TAKE 1 TABLET(20 MG) BY MOUTH TWICE DAILY   Quantity: 180 tablet  Refills: 1     metoprolol succinate  MG Tb24  Commonly known as: Toprol XL      Take 1 tablet (100 mg total) by mouth 2 (two) times daily.   Quantity: 180 tablet  Refills: 3               Where to Get Your Medications        These medications were sent to [id number] - [place], [place] - [address] AT [facility] [address], [phone], [phone] [address], [facility] [address]      Phone: [phone]   HYDROcodone-acetaminophen 5-325 MG Tabs         Follow up:       Follow up Labs and imaging:         Time spent:  > 30 minutes    KHUSHBOO SANDERS MD  7/17/2025

## 2025-07-17 NOTE — PLAN OF CARE
Pt remains alert and oriented overnight. Pt developed hematuria overnight, Dr. Xiao notified, Lovenox held. Pt updated on plan of care and all questions answered at this time.     Problem: PAIN - ADULT  Goal: Verbalizes/displays adequate comfort level or patient's stated pain goal  Description: INTERVENTIONS:  - Encourage pt to monitor pain and request assistance  - Assess pain using appropriate pain scale  - Administer analgesics based on type and severity of pain and evaluate response  - Implement non-pharmacological measures as appropriate and evaluate response  - Consider cultural and social influences on pain and pain management  - Manage/alleviate anxiety  - Utilize distraction and/or relaxation techniques  - Monitor for opioid side effects  - Notify MD/LIP if interventions unsuccessful or patient reports new pain  - Anticipate increased pain with activity and pre-medicate as appropriate  Outcome: Progressing     Problem: SAFETY ADULT - FALL  Goal: Free from fall injury  Description: INTERVENTIONS:  - Assess pt frequently for physical needs  - Identify cognitive and physical deficits and behaviors that affect risk of falls.  - [place] fall precautions as indicated by assessment.  - Educate pt/family on patient safety including physical limitations  - Instruct pt to call for assistance with activity based on assessment  - Modify environment to reduce risk of injury  - Provide assistive devices as appropriate  - Consider OT/PT consult to assist with strengthening/mobility  - Encourage toileting schedule  Outcome: Progressing     Problem: CARDIOVASCULAR - ADULT  Goal: Maintains optimal cardiac output and hemodynamic stability  Description: INTERVENTIONS:  - Monitor vital signs, rhythm, and trends  - Monitor for bleeding, hypotension and signs of decreased cardiac output  - Evaluate effectiveness of vasoactive medications to optimize hemodynamic stability  - Monitor arterial and/or venous puncture sites for  bleeding and/or hematoma  - Assess quality of pulses, skin color and temperature  - Assess for signs of decreased coronary artery perfusion - ex. Angina  - Evaluate fluid balance, assess for edema, trend weights  Outcome: Progressing  Goal: Absence of cardiac arrhythmias or at baseline  Description: INTERVENTIONS:  - Continuous cardiac monitoring, monitor vital signs, obtain 12 lead EKG if indicated  - Evaluate effectiveness of antiarrhythmic and heart rate control medications as ordered  - Initiate emergency measures for life threatening arrhythmias  - Monitor electrolytes and administer replacement therapy as ordered  Outcome: Progressing     Problem: METABOLIC/FLUID AND ELECTROLYTES - ADULT  Goal: Glucose maintained within prescribed range  Description: INTERVENTIONS:  - Monitor Blood Glucose as ordered  - Assess for signs and symptoms of hyperglycemia and hypoglycemia  - Administer ordered medications to maintain glucose within target range  - Assess barriers to adequate nutritional intake and initiate nutrition consult as needed  - Instruct patient on self management of diabetes  Outcome: Progressing

## 2025-07-18 ENCOUNTER — PATIENT OUTREACH (OUTPATIENT)
Dept: CASE MANAGEMENT | Age: 75
End: 2025-07-18

## 2025-07-18 ENCOUNTER — PATIENT OUTREACH (OUTPATIENT)
Age: 75
End: 2025-07-18

## 2025-07-18 NOTE — PROGRESS NOTES
Transitional Care Management   Discharge Date: 25  Contact Date: 2025    Assessment:  (Insert appropriate Smartphrase below after completing flowsheet)  TCM Initial Assessment    General:  Assessment completed with: Patient  Patient Subjective: The patient states he is feeling pretty rough.  Chief Complaint: Femoral artery occlusion, right  Femoral artery thrombosis, right (HCC)  Verify patient name and  with patient/ caregiver: Yes    Hospital Stay/Discharge:  Tell me what you understand of why you were in the hospital or emergency department: Femoral endorectomy  Prior to leaving the hospital were your Discharge Instructions reviewed with you?: Yes  Did you receive a copy of your written Discharge Instructions?: Yes  What questions do you have about your Discharge Instructions?: None  Do you feel better or worse since you left the hospital or emergency department?: Better    Follow - Up Appointment:  Do you have a follow-up appointment?: No  Are there any barriers to getting to your follow-up appointment?: No    Home Health/DME:  Prior to leaving the hospital was Home Health (HH) arranged for you?: No     Prior to leaving the hospital or emergency department was Durable Medical Equipment (DME), medical supplies, or infusions arranged for you?: No  Are DME/medical supply/infusions needs identified by staff during this assessment?: No     Medications/Diet:       Were you given a different diet per your Discharge Instructions?: No     Questions/Concerns:  Do you have any questions or concerns that have not been discussed?: No         Follow-up Appointments:  Your appointments       Date & Time Appointment Department ([place])    2025 10:00 AM CDT Exam - New Patient with Arcadio Davison MD [facility] ([facility]              [facility]  [facility] [address]  [phone]          Transitional Care Clinic  Was TCC Ordered: No  Was TCC Scheduled: Declined   - If yes: []  Advised patient to bring all medications and blood glucose meter/supplies if applicable.    Primary Care Provider (If no TCC appointment)  Does patient already have a PCP appointment scheduled? No  Care Manager Attempted to schedule PCP office TCM appointment with patient   -If no appointment scheduled: The patient sees Dr. Helton he will call his office to request an appt.    Specialist  Does the patient have any other follow-up appointment(s) that need to be scheduled? Yes   -If yes: Care Manager reviewed upcoming specialist appointments with patient: Yes   -Does the patient need assistance scheduling appointment(s): No      Book By Date: 8/1/2025

## 2025-07-21 ENCOUNTER — TELEPHONE (OUTPATIENT)
Facility: CLINIC | Age: 75
End: 2025-07-21

## 2025-07-21 NOTE — TELEPHONE ENCOUNTER
Isabell has questions regarding treatment plan.  Patient seems to have increased swelling.  Also wants to know if patient is ok to drive?  Please call.  Thank you.

## 2025-07-21 NOTE — TELEPHONE ENCOUNTER
I spoke to KAREN FERNANDEZ,  I called the patient back and advised Isabell to tell patient to elevate the feel / legs 2 times per day. The patient expressed understanding,  Also the patient was advised it is safe to drive when he no longer requires pain meds and when he can feel and move feet without pain or numbness.  Also the patient was advised to call tomorrow for follow up.  The patient expressed understanding.    Michelle

## 2025-07-21 NOTE — TELEPHONE ENCOUNTER
Returned call and spoke to wife Isabell and the patient on speaker phone.  The patient had procedure with Dr. Najjar on Wednesday, 7-, for Right Femoral artery Occlusion.  The patient reports 72 hours after the discharged from the hospital he developed edema in the Right lower extremity from the Right groin to the ankle. It seems to have been worse yesterday.  The incision is dry.  He has bruising in the groin area.  He denies shortness of breath and denies fever.  He has appointment for post op visit on 7-.  He wants to know if he should be seen sooner?  Also can he drive?    Please advise?

## 2025-07-25 ENCOUNTER — APPOINTMENT (OUTPATIENT)
Dept: INTERNAL MEDICINE | Age: 75
End: 2025-07-25

## 2025-07-25 VITALS
OXYGEN SATURATION: 100 % | SYSTOLIC BLOOD PRESSURE: 144 MMHG | HEART RATE: 58 BPM | TEMPERATURE: 97.1 F | HEIGHT: 67 IN | DIASTOLIC BLOOD PRESSURE: 73 MMHG | WEIGHT: 181.7 LBS | BODY MASS INDEX: 28.52 KG/M2 | RESPIRATION RATE: 16 BRPM

## 2025-07-25 DIAGNOSIS — Z11.59 NEED FOR HEPATITIS C SCREENING TEST: ICD-10-CM

## 2025-07-25 DIAGNOSIS — I73.9 PAD (PERIPHERAL ARTERY DISEASE) (CMD): ICD-10-CM

## 2025-07-25 DIAGNOSIS — Z78.9 POOR TOLERANCE FOR AMBULATION: ICD-10-CM

## 2025-07-25 DIAGNOSIS — E11.69 TYPE 2 DIABETES MELLITUS WITH OTHER SPECIFIED COMPLICATION, WITHOUT LONG-TERM CURRENT USE OF INSULIN (CMD): ICD-10-CM

## 2025-07-25 DIAGNOSIS — Z79.01 CURRENT USE OF LONG TERM ANTICOAGULATION: ICD-10-CM

## 2025-07-25 DIAGNOSIS — I50.9 CARDIAC EDEMA  (CMD): ICD-10-CM

## 2025-07-25 DIAGNOSIS — I25.10 CORONARY ARTERY DISEASE INVOLVING NATIVE CORONARY ARTERY OF NATIVE HEART WITHOUT ANGINA PECTORIS: ICD-10-CM

## 2025-07-25 DIAGNOSIS — I48.0 PAROXYSMAL ATRIAL FIBRILLATION  (CMD): ICD-10-CM

## 2025-07-25 DIAGNOSIS — Z09 HOSPITAL DISCHARGE FOLLOW-UP: Primary | ICD-10-CM

## 2025-07-25 DIAGNOSIS — Z13.89 SCREENING FOR NEPHROPATHY: ICD-10-CM

## 2025-07-25 DIAGNOSIS — I10 ESSENTIAL HYPERTENSION: ICD-10-CM

## 2025-07-25 DIAGNOSIS — E78.2 MIXED HYPERLIPIDEMIA: ICD-10-CM

## 2025-07-25 DIAGNOSIS — E11.42 DIABETIC POLYNEUROPATHY ASSOCIATED WITH TYPE 2 DIABETES MELLITUS  (CMD): ICD-10-CM

## 2025-07-25 DIAGNOSIS — I10 PRIMARY HYPERTENSION: ICD-10-CM

## 2025-07-25 DIAGNOSIS — Z85.038 HISTORY OF COLON CANCER: ICD-10-CM

## 2025-07-25 RX ORDER — FUROSEMIDE 40 MG/1
40 TABLET ORAL
COMMUNITY
Start: 2010-03-11 | End: 2025-07-25 | Stop reason: ALTCHOICE

## 2025-07-25 RX ORDER — CLOPIDOGREL BISULFATE 75 MG/1
TABLET ORAL
COMMUNITY
Start: 2025-07-22

## 2025-07-25 RX ORDER — LISINOPRIL 20 MG/1
20 TABLET ORAL
COMMUNITY
Start: 2025-05-29

## 2025-07-25 RX ORDER — ACETAMINOPHEN 500 MG
1000 TABLET ORAL
COMMUNITY

## 2025-07-25 RX ORDER — ASPIRIN 81 MG/1
81 TABLET ORAL DAILY
COMMUNITY
Start: 2025-05-06

## 2025-07-25 RX ORDER — ASPIRIN 81 MG
1 TABLET,CHEWABLE ORAL
COMMUNITY
Start: 2025-05-06

## 2025-07-25 RX ORDER — BUMETANIDE 1 MG/1
1 TABLET ORAL DAILY
Qty: 90 TABLET | Refills: 1 | Status: SHIPPED | OUTPATIENT
Start: 2025-07-25

## 2025-07-25 RX ORDER — GLIPIZIDE 10 MG/1
10 TABLET, FILM COATED, EXTENDED RELEASE ORAL
COMMUNITY
Start: 2025-06-23

## 2025-07-25 RX ORDER — PREGABALIN 25 MG/1
CAPSULE ORAL
COMMUNITY
Start: 2025-05-07 | End: 2025-07-25 | Stop reason: ALTCHOICE

## 2025-07-25 RX ORDER — ALPRAZOLAM 0.5 MG
0.5 TABLET ORAL
COMMUNITY
Start: 2025-04-23

## 2025-07-25 RX ORDER — METOPROLOL SUCCINATE 100 MG/1
100 TABLET, EXTENDED RELEASE ORAL
COMMUNITY
Start: 2025-05-30

## 2025-07-25 RX ORDER — GABAPENTIN 300 MG/1
CAPSULE ORAL
Qty: 270 CAPSULE | Refills: 3 | Status: SHIPPED | OUTPATIENT
Start: 2025-07-25

## 2025-07-25 RX ORDER — HYDROCODONE BITARTRATE AND ACETAMINOPHEN 5; 325 MG/1; MG/1
1 TABLET ORAL EVERY 6 HOURS PRN
COMMUNITY
Start: 2025-07-17

## 2025-07-30 ENCOUNTER — TELEPHONE (OUTPATIENT)
Dept: INTERNAL MEDICINE | Age: 75
End: 2025-07-30

## 2025-07-31 ENCOUNTER — OFFICE VISIT (OUTPATIENT)
Facility: CLINIC | Age: 75
End: 2025-07-31
Payer: MEDICARE

## 2025-07-31 VITALS — SYSTOLIC BLOOD PRESSURE: 110 MMHG | DIASTOLIC BLOOD PRESSURE: 58 MMHG

## 2025-07-31 DIAGNOSIS — I70.201 FEMORAL ARTERY OCCLUSION, RIGHT: Primary | ICD-10-CM

## 2025-07-31 PROCEDURE — 99024 POSTOP FOLLOW-UP VISIT: CPT | Performed by: SURGERY

## 2025-08-22 ENCOUNTER — LAB ENCOUNTER (OUTPATIENT)
Dept: LAB | Facility: HOSPITAL | Age: 75
End: 2025-08-22
Attending: INTERNAL MEDICINE

## 2025-08-22 DIAGNOSIS — Z85.038 PERSONAL HISTORY OF COLON CANCER: ICD-10-CM

## 2025-08-22 DIAGNOSIS — Z11.59 NEED FOR HEPATITIS C SCREENING TEST: Primary | ICD-10-CM

## 2025-08-22 DIAGNOSIS — I50.9 HEART FAILURE, UNSPECIFIED (HCC): ICD-10-CM

## 2025-08-22 DIAGNOSIS — I48.0 PAROXYSMAL ATRIAL FIBRILLATION (HCC): ICD-10-CM

## 2025-08-22 DIAGNOSIS — E78.2 MIXED HYPERLIPIDEMIA: ICD-10-CM

## 2025-08-22 DIAGNOSIS — E11.69 DIABETES MELLITUS ASSOCIATED WITH HORMONAL ETIOLOGY (HCC): ICD-10-CM

## 2025-08-22 DIAGNOSIS — Z13.89 SCREENING FOR GOUT: ICD-10-CM

## 2025-08-22 DIAGNOSIS — Z79.01 LONG TERM (CURRENT) USE OF ANTICOAGULANTS: ICD-10-CM

## 2025-08-22 LAB
ALBUMIN SERPL-MCNC: 4.8 G/DL (ref 3.2–4.8)
ALBUMIN/GLOB SERPL: 1.8 (ref 1–2)
ALP LIVER SERPL-CCNC: 81 U/L (ref 45–117)
ALT SERPL-CCNC: 12 U/L (ref 10–49)
ANION GAP SERPL CALC-SCNC: 7 MMOL/L (ref 0–18)
AST SERPL-CCNC: 13 U/L (ref ?–34)
BASOPHILS # BLD AUTO: 0.07 X10(3) UL (ref 0–0.2)
BASOPHILS NFR BLD AUTO: 1 %
BILIRUB SERPL-MCNC: 0.6 MG/DL (ref 0.2–1.1)
BUN BLD-MCNC: 36 MG/DL (ref 9–23)
BUN/CREAT SERPL: 20.6 (ref 10–20)
CALCIUM BLD-MCNC: 9.7 MG/DL (ref 8.7–10.4)
CEA SERPL-MCNC: <0.5 NG/ML (ref ?–5)
CHLORIDE SERPL-SCNC: 107 MMOL/L (ref 98–112)
CHOLEST SERPL-MCNC: 175 MG/DL (ref ?–200)
CO2 SERPL-SCNC: 24 MMOL/L (ref 21–32)
CREAT BLD-MCNC: 1.75 MG/DL (ref 0.7–1.3)
CREAT UR-SCNC: 19.3 MG/DL
DEPRECATED RDW RBC AUTO: 43.6 FL (ref 35.1–46.3)
EGFRCR SERPLBLD CKD-EPI 2021: 41 ML/MIN/1.73M2 (ref 60–?)
EOSINOPHIL # BLD AUTO: 0.41 X10(3) UL (ref 0–0.7)
EOSINOPHIL NFR BLD AUTO: 6 %
ERYTHROCYTE [DISTWIDTH] IN BLOOD BY AUTOMATED COUNT: 13.9 % (ref 11–15)
EST. AVERAGE GLUCOSE BLD GHB EST-MCNC: 151 MG/DL (ref 68–126)
FASTING PATIENT LIPID ANSWER: YES
FASTING STATUS PATIENT QL REPORTED: YES
GLOBULIN PLAS-MCNC: 2.6 G/DL (ref 2–3.5)
GLUCOSE BLD-MCNC: 159 MG/DL (ref 70–99)
HBA1C MFR BLD: 6.9 % (ref ?–5.7)
HCT VFR BLD AUTO: 37.8 % (ref 39–53)
HDLC SERPL-MCNC: 40 MG/DL (ref 40–59)
HGB BLD-MCNC: 12.7 G/DL (ref 13–17.5)
IMM GRANULOCYTES # BLD AUTO: 0.01 X10(3) UL (ref 0–1)
IMM GRANULOCYTES NFR BLD: 0.1 %
LDLC SERPL CALC-MCNC: 114 MG/DL (ref ?–100)
LYMPHOCYTES # BLD AUTO: 0.77 X10(3) UL (ref 1–4)
LYMPHOCYTES NFR BLD AUTO: 11.3 %
MCH RBC QN AUTO: 28.9 PG (ref 26–34)
MCHC RBC AUTO-ENTMCNC: 33.6 G/DL (ref 31–37)
MCV RBC AUTO: 85.9 FL (ref 80–100)
MONOCYTES # BLD AUTO: 0.47 X10(3) UL (ref 0.1–1)
MONOCYTES NFR BLD AUTO: 6.9 %
NEUTROPHILS # BLD AUTO: 5.1 X10 (3) UL (ref 1.5–7.7)
NEUTROPHILS # BLD AUTO: 5.1 X10(3) UL (ref 1.5–7.7)
NEUTROPHILS NFR BLD AUTO: 74.7 %
NONHDLC SERPL-MCNC: 135 MG/DL (ref ?–130)
NT-PROBNP SERPL-MCNC: 641 PG/ML (ref ?–125)
OSMOLALITY SERPL CALC.SUM OF ELEC: 298 MOSM/KG (ref 275–295)
PLATELET # BLD AUTO: 144 10(3)UL (ref 150–450)
POTASSIUM SERPL-SCNC: 4.6 MMOL/L (ref 3.5–5.1)
PROT SERPL-MCNC: 7.4 G/DL (ref 5.7–8.2)
PROT UR-MCNC: <6 MG/DL (ref ?–14)
RBC # BLD AUTO: 4.4 X10(6)UL (ref 3.8–5.8)
SODIUM SERPL-SCNC: 138 MMOL/L (ref 136–145)
TRIGL SERPL-MCNC: 115 MG/DL (ref 30–149)
VLDLC SERPL CALC-MCNC: 20 MG/DL (ref 0–30)
WBC # BLD AUTO: 6.8 X10(3) UL (ref 4–11)

## 2025-08-22 PROCEDURE — 83880 ASSAY OF NATRIURETIC PEPTIDE: CPT

## 2025-08-22 PROCEDURE — 86803 HEPATITIS C AB TEST: CPT

## 2025-08-22 PROCEDURE — 82570 ASSAY OF URINE CREATININE: CPT

## 2025-08-22 PROCEDURE — 84156 ASSAY OF PROTEIN URINE: CPT

## 2025-08-22 PROCEDURE — 82378 CARCINOEMBRYONIC ANTIGEN: CPT

## 2025-08-22 PROCEDURE — 80061 LIPID PANEL: CPT

## 2025-08-22 PROCEDURE — 85025 COMPLETE CBC W/AUTO DIFF WBC: CPT

## 2025-08-22 PROCEDURE — 83036 HEMOGLOBIN GLYCOSYLATED A1C: CPT

## 2025-08-22 PROCEDURE — 36415 COLL VENOUS BLD VENIPUNCTURE: CPT

## 2025-08-22 PROCEDURE — 80053 COMPREHEN METABOLIC PANEL: CPT

## 2025-08-23 LAB — HCV AB: NON REACTIVE

## 2025-08-29 ENCOUNTER — TELEPHONE (OUTPATIENT)
Dept: INTERNAL MEDICINE | Age: 75
End: 2025-08-29

## (undated) DIAGNOSIS — C18.9 MALIGNANT NEOPLASM OF COLON, UNSPECIFIED PART OF COLON (HCC): Primary | ICD-10-CM

## (undated) DEVICE — APPLICATOR PREP 26ML CHG 2% ISO ALC 70%

## (undated) DEVICE — SUT PROL 6-0 30IN C-1 NABSRB BLU 13MM 3/8 CIR

## (undated) DEVICE — Device

## (undated) DEVICE — SUT PERMA- 2-0 18IN NABSRB BLK TIE SILK

## (undated) DEVICE — SUT PROL 5-0 36IN C-1 NABSRB BLU 13MM 3/8 CIR

## (undated) DEVICE — SOLUTION IRRIG 1000ML 0.9% NACL USP BTL

## (undated) DEVICE — SUT VCRL 2-0 36IN CT-1 ABSRB UD L36MM 1/2 CIR

## (undated) DEVICE — TRAY CATH FOLEY 16FR INCLUDE BARDX IC COMPLT CARE

## (undated) DEVICE — HEMOSTAT KIT SURGIFLO THROM 8ML

## (undated) DEVICE — CONTAINER SPEC 4OZ POLYPR GRAD SCR LID LEAK

## (undated) DEVICE — SUT PERMA- 4-0 18IN NABSRB BLK TIE SILK

## (undated) DEVICE — SUT PROL 7-0 30IN BV-1 NABSRB BLU 9.3MM 3/8 C

## (undated) DEVICE — HANDLE SUR BLU PLAS LT FLX SLIP ON ST DISP

## (undated) DEVICE — SUT PERMA- 2-0 30IN NABSRB BLK TIE SILK

## (undated) DEVICE — SYSTEM SEMI RIG LNR 3000CC W/ EL AND SELF

## (undated) DEVICE — SLEEVE PROTCT SUR 1 SZ ST SMS EVOL 4

## (undated) NOTE — LETTER
[facility]  [address], [place], [place]    Authorization for Surgical Operation and Procedure                               I hereby authorize Najjar, Samer F, MD, my physician and his/her assistants (if applicable), which may include medical students, residents, and/or fellows, to perform the following surgical operation/ procedure and administer such anesthesia as may be determined necessary by my physician: Operation/Procedure name (s) FEMORAL ENDARTERECTOMY BYPASS on Geovanny Prabhakar   2.   I recognize that during the surgical operation/procedure, unforeseen conditions may necessitate additional or different procedures than those listed above.  I, therefore, further authorize and request that the above-named surgeon, assistants, or designees perform such procedures as are, in their judgment, necessary and desirable.    3.   My surgeon/physician has discussed prior to my surgery the potential benefits, risks and side effects of this procedure; the likelihood of achieving goals; and potential problems that might occur during recuperation.  They also discussed reasonable alternatives to the procedure, including risks, benefits, and side effects related to the alternatives and risks related to not receiving this procedure.  I have had all my questions answered and I acknowledge that no guarantee has been made as to the result that may be obtained.    4.   Should the need arise during my operation/procedure, which includes change of level of care prior to discharge, I also consent to the administration of blood and/or blood products.  Further, I understand that despite careful testing and screening of blood or blood products by collecting agencies, I may still be subject to ill effects as a result of receiving a blood transfusion and/or blood products.  The following are some, but not all, of the potential risks that can occur: fever and allergic reactions, hemolytic reactions, transmission of  diseases such as Hepatitis, AIDS and Cytomegalovirus (CMV) and fluid overload.  In the event that I wish to have an autologous transfusion of my own blood, or a directed donor transfusion, I will discuss this with my physician.  Check only if Refusing Blood or Blood Products  I understand refusal of blood or blood products as deemed necessary by my physician may have serious consequences to my condition to include possible death. I hereby assume responsibility for my refusal and release the hospital, its personnel, and my physicians from any responsibility for the consequences of my refusal.    o  Refuse   5.   I authorize the use of any specimen, organs, tissues, body parts or foreign objects that may be removed from my body during the operation/procedure for diagnosis, research or teaching purposes and their subsequent disposal by hospital authorities.  I also authorize the release of specimen test results and/or written reports to my treating physician on the hospital medical staff or other referring or consulting physicians involved in my care, at the discretion of the Pathologist or my treating physician.    6.   I consent to the photographing or videotaping of the operations or procedures to be performed, including appropriate portions of my body for medical, scientific, or educational purposes, provided my identity is not revealed by the pictures or by descriptive texts accompanying them.  If the procedure has been photographed/videotaped, the surgeon will obtain the original picture, image, videotape or CD.  The hospital will not be responsible for storage, release or maintenance of the picture, image, tape or CD.    7.   I consent to the presence of a  or observers in the operating room as deemed necessary by my physician or their designees.    8.   I recognize that in the event my procedure results in extended X-Ray/fluoroscopy time, I may develop a skin reaction.    9. If I have a Do Not  Attempt Resuscitation (DNAR) order in place, that status will be suspended while in the operating room, procedural suite, and during the recovery period unless otherwise explicitly stated by me (or a person authorized to consent on my behalf). The surgeon or my attending physician will determine when the applicable recovery period ends for purposes of reinstating the DNAR order.  10. Patients having a sterilization procedure: I understand that if the procedure is successful the results will be permanent and it will therefore be impossible for me to inseminate, conceive, or bear children.  I also understand that the procedure is intended to result in sterility, although the result has not been guaranteed.   11. I acknowledge that my physician has explained sedation/analgesia administration to me including the risk and benefits I consent to the administration of sedation/analgesia as may be necessary or desirable in the judgment of my physician.    I CERTIFY THAT I HAVE READ AND FULLY UNDERSTAND THE ABOVE CONSENT TO OPERATION and/or OTHER PROCEDURE.     ____________________________________  _________________________________        ______________________________  Signature of Patient    Signature of Responsible Person                Printed Name of Responsible Person                                      ____________________________________  _____________________________                ________________________________  Signature of Witness        Date  Time         Relationship to Patient    STATEMENT OF PHYSICIAN My signature below affirms that prior to the time of the procedure; I have explained to the patient and/or his/her legal representative, the risks and benefits involved in the proposed treatment and any reasonable alternative to the proposed treatment. I have also explained the risks and benefits involved in refusal of the proposed treatment and alternatives to the proposed treatment and have answered the  patient's questions. If I have a significant financial interest in a co-management agreement or a significant financial interest in any product or implant, or other significant relationship used in this procedure/surgery, I have disclosed this and had a discussion with my patient.     _____________________________________________________              _____________________________  (Signature of Physician)                                                                                         (Date)                                   (Time)  Patient Name: Geovanny SEYMOUR Vanna      : 1950      Printed: 2025     Medical Record #: [id number]                                      Page 1 of 1

## (undated) NOTE — LETTER
[facility]  Administration of Anesthesia  I Geovanny Prabhakar agree to be cared for by a physician anesthesiologist alone and/or with a nurse anesthetist, who is specially trained to monitor me and give me medicine to put me to sleep or keep me comfortable during my procedure    I understand that my anesthesiologist and/or anesthetist is not an employee or agent of [facility] or [employer]. He or she works for [facility]    As the patient asking for anesthesia services, I agree to:  Allow the anesthesiologist (anesthesia doctor) to give me medicine and do additional procedures as necessary. Some examples are: Starting or using an “IV” to give me medicine, fluids or blood during my procedure, and having a breathing tube placed to help me breathe when I’m asleep (intubation). In the event that my heart stops working properly, I understand that my anesthesiologist will make every effort to sustain my life, unless otherwise directed by [facility] Do Not Resuscitate documents.  Tell my anesthesia doctor before my procedure:  If I am pregnant.  The last time that I ate or drank.  iii. All of the medicines I take (including prescriptions, herbal supplements, and pills I can buy without a prescription (including street drugs/illegal medications). Failure to inform my anesthesiologist about these medicines may increase my risk of anesthetic complications.  iv.If I am allergic to anything or have had a reaction to anesthesia before.  I understand how the anesthesia medicine will help me (benefits).  I understand that with any type of anesthesia medicine there are risks:  The most common risks are: nausea, vomiting, sore throat, muscle soreness, damage to my eyes, mouth, or teeth (from breathing tube placement).  Rare risks include: remembering what happened during my procedure, allergic reactions to medications, injury to my airway, heart, lungs, vision, nerves, or  muscles and in extremely rare instances death.  My doctor has explained to me other choices available to me for my care (alternatives).  Pregnant Patients (“epidural”):  I understand that the risks of having an epidural (medicine given into my back to help control pain during labor), include itching, low blood pressure, difficulty urinating, headache or slowing of the baby’s heart. Very rare risks include infection, bleeding, seizure, irregular heart rhythms and nerve injury.  Regional Anesthesia (“spinal”, “epidural”, & “nerve blocks”):  I understand that rare but potential complications include headache, bleeding, infection, seizure, irregular heart rhythms, and nerve injury.    _____________________________________________________________________________  Patient (or Representative) Signature/Relationship to Patient  Date   Time    _____________________________________________________________________________   Name (if used)    Language/Organization   Time    _____________________________________________________________________________  Nurse Anesthetist Signature     Date   Time  _____________________________________________________________________________  Anesthesiologist Signature     Date   Time  I have discussed the procedure and information above with the patient (or patient’s representative) and answered their questions. The patient or their representative has agreed to have anesthesia services.    _____________________________________________________________________________  Witness        Date   Time  I have verified that the signature is that of the patient or patient’s representative, and that it was signed before the procedure  Patient Name: Geovanny Prabhakar     : 1950                 Printed: 2025 at 11:13 AM    Medical Record #: [id number]                                            Page 1 of 1  ----------ANESTHESIA CONSENT----------

## (undated) NOTE — LETTER
[place] [address]  Authorization for Invasive Procedures  Date: 12/19/2024           Time: 3965    I hereby authorize Dr. Greenfield, my physician and his/her assistants (if applicable), which may include medical students, residents, and/or fellows, to perform the following surgical operation/ procedure and administer such anesthesia as may be determined necessary by my physician:  Operation/Procedure name (s) Peripheral angiography, atherectomy, percutaneous transluminal angioplasty (PTA) and/or vascular stenting on Geovanny Prabhakar  2.   I recognize that during the surgical operation/procedure, unforeseen conditions may necessitate additional or different procedures than those listed above.  I, therefore, further authorize and request that the above-named surgeon, assistants, or designees perform such procedures as are, in their judgment, necessary and desirable.    3.   My surgeon/physician has discussed prior to my surgery the potential benefits, risks and side effects of this procedure; the likelihood of achieving goals; and potential problems that might occur during recuperation.  They also discussed reasonable alternatives to the procedure, including risks, benefits, and side effects related to the alternatives and risks related to not receiving this procedure.  I have had all my questions answered and I acknowledge that no guarantee has been made as to the result that may be obtained.    4.   Should the need arise during my operation/procedure, which includes change of level of care prior to discharge, I also consent to the administration of blood and/or blood products.  Further, I understand that despite careful testing and screening of blood or blood products by collecting agencies, I may still be subject to ill effects as a result of receiving a blood transfusion and/or blood products.  The following are some, but not all, of the potential risks that can occur: fever and allergic  reactions, hemolytic reactions, transmission of diseases such as Hepatitis, AIDS and Cytomegalovirus (CMV) and fluid overload.  In the event that I wish to have an autologous transfusion of my own blood, or a directed donor transfusion, I will discuss this with my physician.     Check only if Refusing Blood or Blood Products  I understand refusal of blood or blood products as deemed necessary by my physician may have serious consequences to my condition to include possible death. I hereby assume responsibility for my refusal and release the hospital, its personnel, and my physicians from any responsibility for the consequences of my refusal.      o  Refuse        5.   I authorize the use of any specimen, organs, tissues, body parts or foreign objects that may be removed from my body during the operation/procedure for diagnosis, research or teaching purposes and their subsequent disposal by hospital authorities.  I also authorize the release of specimen test results and/or written reports to my treating physician on the hospital medical staff or other referring or consulting physicians involved in my care, at the discretion of the Pathologist or my treating physician.    6.   I consent to the photographing or videotaping of the operations or procedures to be performed, including appropriate portions of my body for medical, scientific, or educational purposes, provided my identity is not revealed by the pictures or by descriptive texts accompanying them.  If the procedure has been photographed/videotaped, the surgeon will obtain the original picture, image, videotape or CD.  The hospital will not be responsible for storage, release or maintenance of the picture, image, tape or CD.    7.   I consent to the presence of a  or observers in the operating room as deemed necessary by my physician or their designees.    8.   I recognize that in the event my procedure results in extended X-Ray/fluoroscopy  time, I may develop a skin reaction.    9. If I have a Do Not Attempt Resuscitation (DNAR) order in place, that status will be suspended while in the operating room, procedural suite, and during the recovery period unless otherwise explicitly stated by me (or a person authorized to consent on my behalf). The surgeon or my attending physician will determine when the applicable recovery period ends for purposes of reinstating the DNAR order.  10. Patients having a sterilization procedure: I understand that if the procedure is successful the results will be permanent and it will therefore be impossible for me to inseminate, conceive, or bear children.  I also understand that the procedure is intended to result in sterility, although the result has not been guaranteed.   11. I acknowledge that my physician has explained sedation/analgesia administration to me including the risk and benefits I consent to the administration of sedation/analgesia as may be necessary or desirable in the judgment of my physician.    I CERTIFY THAT I HAVE READ AND FULLY UNDERSTAND THE ABOVE CONSENT TO OPERATION and/or OTHER PROCEDURE.        ____________________________________       _________________________________      ______________________________  Signature of Patient         Signature of Responsible Person        Printed Name of Responsible Person      ____________________________________      _________________________________      ______________________________       Signature of Witness          Relationship to Patient                       Date                                         Time    Patient Name: Geovanny Prabhakar  : 1950    Reviewed: 2024   Printed: 2024  Medical Record #: [id number] Page 1 of 2           STATEMENT OF PHYSICIAN My signature below affirms that prior to the time of the procedure; I have explained to the patient and/or his/her legal representative, the risks and benefits involved in  the proposed treatment and any reasonable alternative to the proposed treatment. I have also explained the risks and benefits involved in refusal of the proposed treatment and alternatives to the proposed treatment and have answered the patient's questions. If I have a significant financial interest in a co-management agreement or a significant financial interest in any product or implant, or other significant relationship used in this procedure/surgery, I have disclosed this and had a discussion with my patient.     _______________________________________________________________ _____________________________  (Signature of Physician)                                                                                         (Date)                                   (Time)    Patient Name: Geovanny SEYMOUR Vanna  : 1950    Reviewed: 2024   Printed: 2024  Medical Record #: [id number] Page 2 of 2

## (undated) NOTE — Clinical Note
TCM assessment completed.  A telephone encounter has been sent to the office to assist with scheduling an appointment.  Thank you.

## (undated) NOTE — LETTER
Hospital Discharge Documentation    From: [facility] Hospitalist's Office  Phone: [phone]    Patient discharged time/date: 2024  1:48 PM  Patient discharge disposition:  Home or Self Care       Discharge Summary - D/C Summary        Discharge Summary signed by Layla Valencia MD at 2024 12:25 PM  Version 1 of 1      Author: Layla Valencia MD Service: Hospitalist Author Type: Physician    Filed: 2024 12:25 PM Date of Service: 2024 12:21 PM Status: Signed    : Layla Valencia MD (Physician)           [facility]  part of [facility]    DISCHARGE SUMMARY     Geovanny Prabhakar Patient Status:  Inpatient    1950 MRN [id number]   Location [facility] 3W/SW Attending Layla Valencia MD   Hosp Day # 1 PCP Aaron Helton MD     Date of Admission: 2024  Date of Discharge:  2024    Discharge Disposition: Home or Self Care    Discharge Diagnosis:     RLE ischemia s/p R SFA Thrombectomy/PTA stent  HTN  T2DM  CKD 3    History of Present Illness:     The patient is a 74-year-old  male with known underlying peripheral arterial disease with multiple prior interventions to his right lower extremity. Recently started developing ischemic pain in his right foot and distal leg. He had a Doppler ultrasound done on  which showed recurrent occlusion of superficial femoral artery, anterior and posterior tibial occlusions, with collaterals to the peroneal trunk. Today he came in with progressive pain and erythema of his right foot. CBC and chemistry unremarkable. GFR is 48, which is at baseline. Started on IV heparin, and he will be admitted to the hospital for further management.     Brief Synopsis:     Patient tolerated the procedure well and is cleared for discharge. Discharge meds ordered per IR.  He will follow up with PCP and IR as opt.    Patient is to remain compliant with all discharge medications and instructions and to follow up as  advised.   Patient encouraged to make healthy lifestyle and dietary changes.    Lace+ Score: 67  59-90 High Risk  29-58 Medium Risk  0-28   Low Risk       TCM Follow-Up Recommendation:  LACE > 58: High Risk of readmission after discharge from the hospital.  **Certification    Admission date was 12/19/2024.  Inpatient stay was shorter than expected.  Patient's Acute pain of right foot was initially serious enough to expect a more lengthy hospitalization but patient improved faster than expected.                 Procedures during hospitalization:   R SFA Thrombectomy/PTA stent    Incidental or significant findings and recommendations (brief descriptions):  None    Lab/Test results pending at Discharge:   None    Consultants:  IR    Discharge Medication List:     Discharge Medications        START taking these medications        Instructions Prescription details   clopidogrel 75 MG Tabs  Commonly known as: Plavix      Take 1 tablet (75 mg total) by mouth daily.   Quantity: 30 tablet  Refills: 0     rivaroxaban 2.5 MG Tabs  Commonly known as: Xarelto      Take 1 tablet (2.5 mg total) by mouth 2 (two) times daily with meals.   Stop taking on: January 19, 2025  Quantity: 60 tablet  Refills: 0            CONTINUE taking these medications        Instructions Prescription details   acetaminophen 325 MG Tabs  Commonly known as: Tylenol      Take 1 tablet (325 mg total) by mouth every 6 (six) hours as needed for Pain.   Refills: 0     ALPRAZolam 0.5 MG Tabs  Commonly known as: Xanax      TAKE 1 TABLET(0.5 MG) BY MOUTH TWICE DAILY AS NEEDED FOR ANXIETY OR SLEEP   Quantity: 60 tablet  Refills: 5     aspirin 81 MG Tbec      Take 1 tablet (81 mg total) by mouth daily.   Quantity: 90 tablet  Refills: 3     furosemide 40 MG Tabs  Commonly known as: Lasix      Take 1 tablet (40 mg total) by mouth as needed (Swelling in the feet).   Refills: 0     glipiZIDE ER 10 MG Tb24  Commonly known as: Glucotrol XL      Take 1 tablet (10 mg  total) by mouth daily.   Quantity: 90 tablet  Refills: 3     HYDROcodone-acetaminophen 5-325 MG Tabs  Commonly known as: Norco      Take 1 tablet by mouth every 8 (eight) hours as needed for Pain.   Refills: 0     lisinopril 20 MG Tabs  Commonly known as: Prinivil; Zestril      Take 1 tablet (20 mg total) by mouth 2 (two) times daily.   Quantity: 180 tablet  Refills: 3     metoprolol succinate  MG Tb24  Commonly known as: Toprol XL      Take 1 tablet (100 mg total) by mouth 2 (two) times daily.   Quantity: 180 tablet  Refills: 3     OneTouch Delica Lancets 33G Misc      Test BS once daily   Quantity: 100 each  Refills: 3     OneTouch Ultra Blue Strp      Check BS once daily1   Quantity: 100 strip  Refills: 3     spironolactone 25 MG Tabs  Commonly known as: Aldactone      TAKE 1 TABLET(25 MG) BY MOUTH DAILY   Quantity: 90 tablet  Refills: 3               Where to Get Your Medications        Information about where to get these medications is not yet available    Ask your nurse or doctor about these medications  clopidogrel 75 MG Tabs  rivaroxaban 2.5 MG Tabs         [place]    Follow-up appointment:   Aaron Helton MD  [address] [facility] [facility] [address]  [phone]    Follow up in 1 week(s)      Taiwo Greenfield MD  [address] [place] [address]  [phone]    Follow up in 1 week(s)      Appointments for Next 30 Days 12/20/2024 - 1/19/2025        Date Arrival Time Visit Type Length Department Provider     1/14/2025  5:00 PM  [facility] US CAROTID DOP VASC [1519] 30 min [facility] US RM5    Patient Instructions:     Please arrive 15 minutes prior to your scheduled appointment time.    There are no eating or activity restrictions for this exam.        Location Instructions:     Your appointment will be at the [facility] located at [address] in [place], [place].&nbsp; For self-parking please park in the Green Lot. There is also   parking at the Main Entrance. Enter the door that says East Entrance. Inside the building walk to the right and check in with [facility]. The phone number for this location is [phone].  [facility]) reserves the right to restrict and prohibit the use of video, recording, and photography devices while on the premises. In order to protect the safety and privacy of our patients and staff, no photography, videotaping, or audio recording is allowed in any [facility] department without prior authorization.    Because of the nature of the Emergency Department/Immediate Care, please be advised of the possibility your appointment may be delayed.    Please bring your insurance card and photo ID. You will also need to bring your doctor's order unless your physician's office submitted the order electronically or faxed the order. Without the order, your test may be delayed or postponed.  Children: Children under the age of 12 must have another adult caregiver with them.  Please do not bring your child/children without a caregiver.  Because of the highly sensitive equipment and privacy of all our patients, children will not be permitted in the exam rooms, unless otherwise noted and in accordance with departmental policy.   PATIENT RESPONSIBILITY ESTIMATE  - (Estimate) We will provide you with your estimated remaining deductible and coinsurance balance for your services at the time of check in.  - (Payment) Please be aware that you may be asked for payment at the time of service.  Masks are optional for all patients and visitors, unless otherwise indicated.                      Vital signs:  Temp:  [97.4 °F (36.3 °C)-98 °F (36.7 °C)] 98 °F (36.7 °C)  Pulse:  [54-74] 74  Resp:  [16-20] 16  BP: (106-159)/() 126/59  SpO2:  [95 %-100 %] 95 %    Physical Exam:    Gen: NAD AO x3  Chest: good air entry CTABL  CVS: normal s1 and s2 RR  Abd: NABS soft NT ND  Neuro: CN 2-12 grossly intact  Ext: no edema in bilateral  LE    -----------------------------------------------------------------------------------------------  PATIENT DISCHARGE INSTRUCTIONS: See electronic chart    Layla Valencia MD  Hospitalist    Time spent:  > 30 minutes    The 21st Century Cures Act makes medical notes like these available to patients in the interest of transparency. Please be advised this is a medical document. Medical documents are intended to carry relevant information, facts as evident, and the clinical opinion of the practitioner. The medical note is intended as peer to peer communication and may appear blunt or direct. It is written in medical language and may contain abbreviations or verbiage that are unfamiliar.     Electronically signed by Layla Valencia MD on 12/20/2024 12:25 PM

## (undated) NOTE — [place] [address]  Authorization for Invasive Procedures  Date: 12/19/2024           Time: 1912    I hereby authorize Dr. Greenfield, my physician and his/her assistants (if applicable), which may include medical students, residents, and/or fellows, to perform the following surgical operation/ procedure and administer such anesthesia as may be determined necessary by my physician:  Operation/Procedure name (s) Right lower extremity angiogram with possible intervention on Geovanny LION Paradagwynreena  2.   I recognize that during the surgical operation/procedure, unforeseen conditions may necessitate additional or different procedures than those listed above.  I, therefore, further authorize and request that the above-named surgeon, assistants, or designees perform such procedures as are, in their judgment, necessary and desirable.    3.   My surgeon/physician has discussed prior to my surgery the potential benefits, risks and side effects of this procedure; the likelihood of achieving goals; and potential problems that might occur during recuperation.  They also discussed reasonable alternatives to the procedure, including risks, benefits, and side effects related to the alternatives and risks related to not receiving this procedure.  I have had all my questions answered and I acknowledge that no guarantee has been made as to the result that may be obtained.    4.   Should the need arise during my operation/procedure, which includes change of level of care prior to discharge, I also consent to the administration of blood and/or blood products.  Further, I understand that despite careful testing and screening of blood or blood products by collecting agencies, I may still be subject to ill effects as a result of receiving a blood transfusion and/or blood products.  The following are some, but not all, of the potential risks that can occur: fever and allergic reactions, hemolytic reactions, transmission of  diseases such as Hepatitis, AIDS and Cytomegalovirus (CMV) and fluid overload.  In the event that I wish to have an autologous transfusion of my own blood, or a directed donor transfusion, I will discuss this with my physician.     Check only if Refusing Blood or Blood Products  I understand refusal of blood or blood products as deemed necessary by my physician may have serious consequences to my condition to include possible death. I hereby assume responsibility for my refusal and release the hospital, its personnel, and my physicians from any responsibility for the consequences of my refusal.      o  Refuse        5.   I authorize the use of any specimen, organs, tissues, body parts or foreign objects that may be removed from my body during the operation/procedure for diagnosis, research or teaching purposes and their subsequent disposal by hospital authorities.  I also authorize the release of specimen test results and/or written reports to my treating physician on the hospital medical staff or other referring or consulting physicians involved in my care, at the discretion of the Pathologist or my treating physician.    6.   I consent to the photographing or videotaping of the operations or procedures to be performed, including appropriate portions of my body for medical, scientific, or educational purposes, provided my identity is not revealed by the pictures or by descriptive texts accompanying them.  If the procedure has been photographed/videotaped, the surgeon will obtain the original picture, image, videotape or CD.  The hospital will not be responsible for storage, release or maintenance of the picture, image, tape or CD.    7.   I consent to the presence of a  or observers in the operating room as deemed necessary by my physician or their designees.    8.   I recognize that in the event my procedure results in extended X-Ray/fluoroscopy time, I may develop a skin reaction.    9. If I  have a Do Not Attempt Resuscitation (DNAR) order in place, that status will be suspended while in the operating room, procedural suite, and during the recovery period unless otherwise explicitly stated by me (or a person authorized to consent on my behalf). The surgeon or my attending physician will determine when the applicable recovery period ends for purposes of reinstating the DNAR order.  10. Patients having a sterilization procedure: I understand that if the procedure is successful the results will be permanent and it will therefore be impossible for me to inseminate, conceive, or bear children.  I also understand that the procedure is intended to result in sterility, although the result has not been guaranteed.   11. I acknowledge that my physician has explained sedation/analgesia administration to me including the risk and benefits I consent to the administration of sedation/analgesia as may be necessary or desirable in the judgment of my physician.    I CERTIFY THAT I HAVE READ AND FULLY UNDERSTAND THE ABOVE CONSENT TO OPERATION and/or OTHER PROCEDURE.        ____________________________________       _________________________________      ______________________________  Signature of Patient         Signature of Responsible Person        Printed Name of Responsible Person      ____________________________________      _________________________________      ______________________________       Signature of Witness          Relationship to Patient                       Date                                         Time    Patient Name: Geovanny Prabhakar  : 1950    Reviewed: 2024   Printed: 2024  Medical Record #: [id number] Page 1 of 2           STATEMENT OF PHYSICIAN My signature below affirms that prior to the time of the procedure; I have explained to the patient and/or his/her legal representative, the risks and benefits involved in the proposed treatment and any reasonable  alternative to the proposed treatment. I have also explained the risks and benefits involved in refusal of the proposed treatment and alternatives to the proposed treatment and have answered the patient's questions. If I have a significant financial interest in a co-management agreement or a significant financial interest in any product or implant, or other significant relationship used in this procedure/surgery, I have disclosed this and had a discussion with my patient.     _______________________________________________________________ _____________________________  (Signature of Physician)                                                                                         (Date)                                   (Time)    Patient Name: Geovanny Prabhakar  : 1950    Reviewed: 2024   Printed: 2024  Medical Record #: [id number] Page 2 of 2

## (undated) NOTE — LETTER
[facility]  [address], [place], [place]    Authorization for Surgical Operation and Procedure                               I hereby authorize Najjar, Samer F, MD, my physician and his/her assistants (if applicable), which may include medical students, residents, and/or fellows, to perform the following surgical operation/ procedure and administer such anesthesia as may be determined necessary by my physician: Operation/Procedure name (s) FEMORAL ENDARTERECTOMY BYPASS on Geovanny Prabhakar   2.   I recognize that during the surgical operation/procedure, unforeseen conditions may necessitate additional or different procedures than those listed above.  I, therefore, further authorize and request that the above-named surgeon, assistants, or designees perform such procedures as are, in their judgment, necessary and desirable.    3.   My surgeon/physician has discussed prior to my surgery the potential benefits, risks and side effects of this procedure; the likelihood of achieving goals; and potential problems that might occur during recuperation.  They also discussed reasonable alternatives to the procedure, including risks, benefits, and side effects related to the alternatives and risks related to not receiving this procedure.  I have had all my questions answered and I acknowledge that no guarantee has been made as to the result that may be obtained.    4.   Should the need arise during my operation/procedure, which includes change of level of care prior to discharge, I also consent to the administration of blood and/or blood products.  Further, I understand that despite careful testing and screening of blood or blood products by collecting agencies, I may still be subject to ill effects as a result of receiving a blood transfusion and/or blood products.  The following are some, but not all, of the potential risks that can occur: fever and allergic reactions, hemolytic reactions, transmission of  diseases such as Hepatitis, AIDS and Cytomegalovirus (CMV) and fluid overload.  In the event that I wish to have an autologous transfusion of my own blood, or a directed donor transfusion, I will discuss this with my physician.  Check only if Refusing Blood or Blood Products  I understand refusal of blood or blood products as deemed necessary by my physician may have serious consequences to my condition to include possible death. I hereby assume responsibility for my refusal and release the hospital, its personnel, and my physicians from any responsibility for the consequences of my refusal.    o  Refuse   5.   I authorize the use of any specimen, organs, tissues, body parts or foreign objects that may be removed from my body during the operation/procedure for diagnosis, research or teaching purposes and their subsequent disposal by hospital authorities.  I also authorize the release of specimen test results and/or written reports to my treating physician on the hospital medical staff or other referring or consulting physicians involved in my care, at the discretion of the Pathologist or my treating physician.    6.   I consent to the photographing or videotaping of the operations or procedures to be performed, including appropriate portions of my body for medical, scientific, or educational purposes, provided my identity is not revealed by the pictures or by descriptive texts accompanying them.  If the procedure has been photographed/videotaped, the surgeon will obtain the original picture, image, videotape or CD.  The hospital will not be responsible for storage, release or maintenance of the picture, image, tape or CD.    7.   I consent to the presence of a  or observers in the operating room as deemed necessary by my physician or their designees.    8.   I recognize that in the event my procedure results in extended X-Ray/fluoroscopy time, I may develop a skin reaction.    9. If I have a Do Not  Attempt Resuscitation (DNAR) order in place, that status will be suspended while in the operating room, procedural suite, and during the recovery period unless otherwise explicitly stated by me (or a person authorized to consent on my behalf). The surgeon or my attending physician will determine when the applicable recovery period ends for purposes of reinstating the DNAR order.  10. Patients having a sterilization procedure: I understand that if the procedure is successful the results will be permanent and it will therefore be impossible for me to inseminate, conceive, or bear children.  I also understand that the procedure is intended to result in sterility, although the result has not been guaranteed.   11. I acknowledge that my physician has explained sedation/analgesia administration to me including the risk and benefits I consent to the administration of sedation/analgesia as may be necessary or desirable in the judgment of my physician.    I CERTIFY THAT I HAVE READ AND FULLY UNDERSTAND THE ABOVE CONSENT TO OPERATION and/or OTHER PROCEDURE.     ____________________________________  _________________________________        ______________________________  Signature of Patient    Signature of Responsible Person                Printed Name of Responsible Person                                      ____________________________________  _____________________________                ________________________________  Signature of Witness        Date  Time         Relationship to Patient    STATEMENT OF PHYSICIAN My signature below affirms that prior to the time of the procedure; I have explained to the patient and/or his/her legal representative, the risks and benefits involved in the proposed treatment and any reasonable alternative to the proposed treatment. I have also explained the risks and benefits involved in refusal of the proposed treatment and alternatives to the proposed treatment and have answered the  patient's questions. If I have a significant financial interest in a co-management agreement or a significant financial interest in any product or implant, or other significant relationship used in this procedure/surgery, I have disclosed this and had a discussion with my patient.     _____________________________________________________              _____________________________  (Signature of Physician)                                                                                         (Date)                                   (Time)  Patient Name: Geovanny SEYMOUR Vanna      : 1950      Printed: 2025     Medical Record #: [id number]                                      Page 1 of 1